# Patient Record
Sex: FEMALE | Race: WHITE | NOT HISPANIC OR LATINO | Employment: OTHER | URBAN - METROPOLITAN AREA
[De-identification: names, ages, dates, MRNs, and addresses within clinical notes are randomized per-mention and may not be internally consistent; named-entity substitution may affect disease eponyms.]

---

## 2017-02-03 ENCOUNTER — GENERIC CONVERSION - ENCOUNTER (OUTPATIENT)
Dept: OTHER | Facility: OTHER | Age: 82
End: 2017-02-03

## 2017-02-23 ENCOUNTER — ALLSCRIPTS OFFICE VISIT (OUTPATIENT)
Dept: OTHER | Facility: OTHER | Age: 82
End: 2017-02-23

## 2017-05-22 ENCOUNTER — ALLSCRIPTS OFFICE VISIT (OUTPATIENT)
Dept: OTHER | Facility: OTHER | Age: 82
End: 2017-05-22

## 2017-05-22 DIAGNOSIS — N39.0 URINARY TRACT INFECTION: ICD-10-CM

## 2017-05-22 DIAGNOSIS — R30.0 DYSURIA: ICD-10-CM

## 2017-05-22 DIAGNOSIS — R60.0 LOCALIZED EDEMA: ICD-10-CM

## 2017-05-26 ENCOUNTER — HOSPITAL ENCOUNTER (OUTPATIENT)
Dept: NON INVASIVE DIAGNOSTICS | Facility: CLINIC | Age: 82
Discharge: HOME | End: 2017-05-26
Payer: MEDICARE

## 2017-05-26 ENCOUNTER — GENERIC CONVERSION - ENCOUNTER (OUTPATIENT)
Dept: OTHER | Facility: OTHER | Age: 82
End: 2017-05-26

## 2017-05-26 DIAGNOSIS — R60.0 LOCALIZED EDEMA: ICD-10-CM

## 2017-05-26 PROCEDURE — 93971 EXTREMITY STUDY: CPT

## 2017-05-30 ENCOUNTER — ALLSCRIPTS OFFICE VISIT (OUTPATIENT)
Dept: OTHER | Facility: OTHER | Age: 82
End: 2017-05-30

## 2017-05-30 LAB
BILIRUB UR QL STRIP: NORMAL
CLARITY UR: NORMAL
COLOR UR: YELLOW
GLUCOSE (HISTORICAL): NORMAL
HGB UR QL STRIP.AUTO: 50
KETONES UR STRIP-MCNC: NORMAL MG/DL
LEUKOCYTE ESTERASE UR QL STRIP: 25
NITRITE UR QL STRIP: NORMAL
PH UR STRIP.AUTO: 5 [PH]
PROT UR STRIP-MCNC: NORMAL MG/DL
SP GR UR STRIP.AUTO: 1.01
UROBILINOGEN UR QL STRIP.AUTO: NORMAL

## 2017-06-03 ENCOUNTER — LAB CONVERSION - ENCOUNTER (OUTPATIENT)
Dept: OTHER | Facility: OTHER | Age: 82
End: 2017-06-03

## 2017-06-03 LAB
BACTERIA UR QL AUTO: ABNORMAL /HPF
BILIRUB UR QL STRIP: NEGATIVE
COLOR UR: YELLOW
COMMENT (HISTORICAL): CLEAR
CULTURE RESULT (HISTORICAL): ABNORMAL
CULTURE RESULT (HISTORICAL): NORMAL
FECAL OCCULT BLOOD DIAGNOSTIC (HISTORICAL): ABNORMAL
GLUCOSE (HISTORICAL): NEGATIVE
HYALINE CASTS #/AREA URNS LPF: ABNORMAL /LPF
KETONES UR STRIP-MCNC: NEGATIVE MG/DL
LEUKOCYTE ESTERASE UR QL STRIP: ABNORMAL
NITRITE UR QL STRIP: POSITIVE
PH UR STRIP.AUTO: 5.5 [PH] (ref 5–8)
PROT UR STRIP-MCNC: NEGATIVE MG/DL
RBC (HISTORICAL): ABNORMAL /HPF
SP GR UR STRIP.AUTO: 1.01 (ref 1–1.03)
SQUAMOUS EPITHELIAL CELLS (HISTORICAL): ABNORMAL /HPF
WBC # BLD AUTO: ABNORMAL /HPF

## 2017-06-05 ENCOUNTER — GENERIC CONVERSION - ENCOUNTER (OUTPATIENT)
Dept: OTHER | Facility: OTHER | Age: 82
End: 2017-06-05

## 2017-08-23 ENCOUNTER — GENERIC CONVERSION - ENCOUNTER (OUTPATIENT)
Dept: OTHER | Facility: OTHER | Age: 82
End: 2017-08-23

## 2017-10-19 ENCOUNTER — ALLSCRIPTS OFFICE VISIT (OUTPATIENT)
Dept: OTHER | Facility: OTHER | Age: 82
End: 2017-10-19

## 2017-10-19 DIAGNOSIS — I48.91 ATRIAL FIBRILLATION (HCC): ICD-10-CM

## 2017-10-19 DIAGNOSIS — E78.5 HYPERLIPIDEMIA: ICD-10-CM

## 2017-10-19 DIAGNOSIS — E87.1 HYPO-OSMOLALITY AND HYPONATREMIA (CODE): ICD-10-CM

## 2017-10-19 DIAGNOSIS — M54.32 SCIATICA OF LEFT SIDE: ICD-10-CM

## 2017-10-19 DIAGNOSIS — M54.31 SCIATICA OF RIGHT SIDE: ICD-10-CM

## 2017-10-19 DIAGNOSIS — R30.0 DYSURIA: ICD-10-CM

## 2017-10-19 DIAGNOSIS — I10 ESSENTIAL (PRIMARY) HYPERTENSION: ICD-10-CM

## 2017-10-19 DIAGNOSIS — G62.9 POLYNEUROPATHY: ICD-10-CM

## 2017-10-19 DIAGNOSIS — M54.50 LOW BACK PAIN: ICD-10-CM

## 2017-10-19 LAB
BILIRUB UR QL STRIP: NORMAL
CLARITY UR: NORMAL
COLOR UR: YELLOW
GLUCOSE (HISTORICAL): NORMAL
HGB UR QL STRIP.AUTO: NORMAL
KETONES UR STRIP-MCNC: NORMAL MG/DL
LEUKOCYTE ESTERASE UR QL STRIP: NORMAL
NITRITE UR QL STRIP: NORMAL
PH UR STRIP.AUTO: 6 [PH]
PROT UR STRIP-MCNC: NORMAL MG/DL
SP GR UR STRIP.AUTO: 1.01
UROBILINOGEN UR QL STRIP.AUTO: NORMAL

## 2017-10-21 NOTE — PROGRESS NOTES
Assessment  1  Atrial fibrillation (427 31) (I48 91)   2  Benign essential hypertension (401 1) (I10)   3  Bilateral sciatica (724 3) (M54 31,M54 32)   4  Dysuria (788 1) (R30 0)   5  Hyponatremia (276 1) (E87 1)   6  Lumbago (724 2) (M54 5)   7  Peripheral neuropathy (356 9) (G62 9)   8  Mixed stress and urge urinary incontinence (788 33) (N39 46)   9  Hyperlipidemia (272 4) (E78 5)    Plan  Atrial fibrillation, Benign essential hypertension, Compression of lumbar vertebra,  Hyperlipidemia, Hyponatremia    · Hydrocodone-Acetaminophen 5-325 MG Oral Tablet; TAKE 1 TABLET Every 6  hours  Atrial fibrillation, Benign essential hypertension, Dysuria, Hyperlipidemia, Hyponatremia,  Peripheral neuropathy    · (1) AMIODARONE; Status:Active; Requested ADV:[id number];    · (1) CBC/PLT/DIFF; Status:Active; Requested TDP:[id number];    · (1) COMPREHENSIVE METABOLIC PANEL; Status:Active; Requested LOD:[id number];    · (1) LIPID PANEL, FASTING; Status:Active; Requested for:19Oct2017;    · (1) TSH WITH FT4 REFLEX; Status:Active; Requested for:19Oct2017;   Bilateral sciatica, Lumbago    · * XR SPINE LUMBAR MINIMUM 4 VIEWS NON INJURY; Status:Active; Requested  PYJ:[id number];   Dysuria    · (1) URINALYSIS (will reflex a microscopy if leukocytes, occult blood, protein or nitrites are  not within normal limits); Status:Active; Requested for:19Oct2017;    · Urine Dip Automated- POC; Status:Complete;   Done: 96KFQ0270 04:07PM  Health Maintenance    · *VB - Urinary Incontinence Screen (Dx Z13 89 Screen for UI); Status:Complete;   Done:  78FAB8264 02:59PM  Insomnia    · ALPRAZolam 0 25 MG Oral Tablet; TAKE 1 TABLET EVERY 6 TO 8 HOURS AS  NEEDED   · TraZODone HCl - 50 MG Oral Tablet; Take 1 tablet by mouth at bedtime    Discussion/Summary    - I did provide orders for repeat labs as the patient is on amiodarone for history of atrial fibrillation  She is not presently on anticoagulation  does have history of urinary tract infection and urinary incontinence  Urinalysis done in the office was completely normal  I suspect that her lower back pain is related to her spinal stenosis  Order provided for x-ray of the lumbar spine  Patient does have history of compression fracture in the pastremains on amiodarone and metoprolol for history of atrial fibrillation  Presently she is in a normal sinus rhythm and osteoporosis  provided for when necessary Vicodin for lower back painremains on when necessary alprazolam for anxiety symptomstrazodone for depression with insomniagabapentin for peripheral neuropathywell controlled on current dose of lisinopril and metoprololcall daughter with results of labs  continues to ambulate with walker  We'll likely need to follow-up in 6 months  The patient, patient's family was counseled regarding instructions for management,-- prognosis,-- impressions,-- risks and benefits of treatment options  total time of encounter was 43 minutes-- and-- 25 minutes was spent counseling  Chief Complaint  Six month follow-up and med refill   Patient is here today for follow up of chronic conditions described in HPI  History of Present Illness  51-year-old female presents with her daughter he does perform translation for her  She was recently seen for urinary tract infection which was adequately treated  No dysuria however she does have right lower back pain  She does continue to have chronic constipation  Linzess is effective however she must take it every other day otherwise it is too painful for her to take  Patient continues on amiodarone  She does have history of paroxysmal atrial fibrillation  Patient also has history of spinal stenosis, bilateral sciatica  She does ambulate with a walker  Vicodin does provide relief of her lower back pain however it is quite constipating  She does need refills of all      Review of Systems    Constitutional: No fever, no chills, feels well, no tiredness, no recent weight gain or weight loss     Eyes: No complaints of eye pain, no red eyes, no eyesight problems, no discharge, no dry eyes, no itching of eyes  ENT: no complaints of earache, no loss of hearing, no nose bleeds, no nasal discharge, no sore throat, no hoarseness  Cardiovascular: lower extremity edema, but-- the heart rate was not slow,-- no chest pain,-- no intermittent leg claudication,-- the heart rate was not fast-- and-- no palpitations  Respiratory: No complaints of shortness of breath, no wheezing, no cough, no SOB on exertion, no orthopnea, no PND  Gastrointestinal: constipation, but-- as noted in HPI,-- no abdominal pain,-- no nausea,-- no vomiting,-- no diarrhea-- and-- no blood in stools  Genitourinary: No complaints of dysuria, no incontinence, no pelvic pain, no dysmenorrhea, no vaginal discharge or bleeding  Musculoskeletal: arthralgias,-- joint stiffness-- and-- And relates that the assistance of a walker, but-- as noted in HPI  Integumentary: No complaints of skin rash or lesions, no itching, no skin wounds, no breast pain or lump  Neurological: numbness-- and-- tingling, but-- no headache,-- no confusion,-- no dizziness,-- no convulsions-- and-- no fainting  Psychiatric: sleep disturbances-- and-- depression, but-- as noted in HPI,-- not suicidal,-- no anxiety,-- no personality change-- and-- no emotional problems  Endocrine: No complaints of proptosis, no hot flashes, no muscle weakness, no deepening of the voice, no feelings of weakness  Hematologic/Lymphatic: No complaints of swollen glands, no swollen glands in the neck, does not bleed easily, does not bruise easily  Active Problems  1  Atrial fibrillation (427 31) (I48 91)   2  Benign essential hypertension (401 1) (I10)   3  Bilateral sciatica (724 3) (M54 31,M54 32)   4  Cataract (366 9) (H26 9)   5  Change in stool habits (787 99) (R19 4)   6  Compression of lumbar vertebra (805 4) (S32 000A)   7  Depression (311) (F32 9)   8   Dizziness and giddiness (780  4) (R42)   9  Dysfunction of Eustachian tube, unspecified laterality (381 81) (H69 80)   10  Dysuria (788 1) (R30 0)   11  Edema of right lower extremity (782 3) (R60 0)   12  Gastroparesis (536 3) (K31 84)   13  Hyperlipidemia (272 4) (E78 5)   14  Hyponatremia (276 1) (E87 1)   15  Insomnia (780 52) (G47 00)   16  Lumbago (724 2) (M54 5)   17  Mixed stress and urge urinary incontinence (788 33) (N39 46)   18  Peripheral neuropathy (356 9) (G62 9)   19  Restless legs syndrome (333 94) (G25 81)   20  Right shoulder pain (719 41) (M25 511)   21  Rosacea, acne (695 3) (L71 9)   22  Senile dementia without behavioral disturbance (290 0) (F03 90)   23  Stasis edema of right lower extremity (459 30) (I87 301)   24  Tremor (781 0) (R25 1)    Past Medical History  1  History of Chest pain (786 50) (R07 9)   2  History of Gait disturbance (781 2) (R26 9)   3  History of abdominal pain (V13 89) (Z87 898)   4  History of acute bronchitis (V12 69) (Z87 09)   5  History of melena (V12 79) (Z87 19)   6  History of urinary tract infection (V13 02) (Z87 440)   7  History of wheezing (V12 69) (Z87 898)   8  History of Hypoxemia (799 02) (R09 02)   9  History of Tension type headache (339 10) (G44 209)    The active problems and past medical history were reviewed and updated today  Surgical History  1  History of Back Surgery    Family History  Mother    1  No pertinent family history    The family history was reviewed and updated today  Social History   · Lives with family   · Never A Smoker  The social history was reviewed and updated today  The social history was reviewed and is unchanged  Current Meds   1  ALPRAZolam 0 25 MG Oral Tablet; TAKE 1 TABLET EVERY 6 TO 8 HOURS AS NEEDED; Therapy: 77EAZ2382 to (Evaluate:51Spx4913); Last Rx:21Def7415 Ordered   2  Amiodarone HCl - 200 MG Oral Tablet; TAKE 1 TABLET DAILY;    Therapy: 21Tpb9187 to (Evaluate:17May2018)  Requested for: 07ORQ3532; Last   Rx:22May2017 Ordered   3  Aspirin 81 MG TABS; take 2 tablet daily; Therapy: 77RRX6335 to (Evaluate:42Rkk3027) Recorded   4  Gabapentin 100 MG Oral Capsule; TAKE 1 CAPSULE TWICE DAILY; Therapy: 73DWK0042 to (Evaluate:17May2018)  Requested for: 59GSI5895; Last   Rx:22May2017 Ordered   5  Hydrocodone-Acetaminophen 5-325 MG Oral Tablet; TAKE 1 TABLET Every 6 hours; Therapy: 32STV2068 to (Evaluate:21Jun2017); Last Rx:22May2017 Ordered   6  Linzess 145 MCG Oral Capsule; take 1 capsule daily; Therapy: 54JIM5918 to (Last Rx:22May2017)  Requested for: 96GPM8082 Ordered   7  Lisinopril 10 MG Oral Tablet; take 1 tablet by mouth every day; Therapy: 56HCW9051 to (Evaluate:17May2018)  Requested for: 42QQY6714; Last   Rx:22May2017 Ordered   8  Magnesium 400 MG CAPS; take 1 capsule daily; Therapy: 31Wik9433 to Recorded   9  Metoprolol Succinate  MG Oral Tablet Extended Release 24 Hour; take 1 tablet by   mouth daily; Therapy: 95MIJ3715 to (Evaluate:17May2018)  Requested for: 52MQP4905; Last   Rx:22May2017 Ordered   10  MetroNIDAZOLE 0 75 % External Cream; APPLY AND GENTLY MASSAGE INTO    AFFECTED AREA(S) TWICE DAILY; Therapy: 65OAH9081 to (Last Rx:27Apr2016)  Requested for: 69Jdv2410 Ordered   11  ROPINIRole HCl - 2 MG Oral Tablet; TAKE 1 TABLET AT BEDTIME; Therapy: 04PWK1219 to (Chito Lawrence)  Requested for: 36NET0108; Last    Rx:22May2017 Ordered   12  Tolterodine Tartrate ER 2 MG Oral Capsule Extended Release 24 Hour; take 1 capsule    daily; Therapy: 46IWI4747 to (Evaluate:08Ufn0811)  Requested for: 98SUY5726; Last    Rx:19Jun2017 Ordered   13  TraZODone HCl - 50 MG Oral Tablet; Take 1 tablet by mouth at bedtime; Therapy: 89CLF0992 to (Evaluate:16Nov2017)  Requested for: 14BMH7578; Last    Rx:20May2017 Ordered    The medication list was reviewed and updated today        Allergies  1  morphine    Vitals  Vital Signs    Recorded: 47NNW2542 03:06PM   Heart Rate 68   Respiration 16   Systolic 738   Diastolic 78   Height 4 ft 10 in   Weight 143 lb    BMI Calculated 29 89   BSA Calculated 1 58     Physical Exam    Constitutional   General appearance: Abnormal   well developed,-- acutely ill,-- comfortable,-- well nourished-- and-- well hydrated  -- No acute distress, elderly and frail  Ears, Nose, Mouth, and Throat   Oropharynx: Normal with no erythema, edema, exudate or lesions  Pulmonary   Respiratory effort: No increased work of breathing or signs of respiratory distress  Auscultation of lungs: Clear to auscultation  Cardiovascular   Palpation of heart: Normal PMI, no thrills  Auscultation of heart: Normal rate and rhythm, normal S1 and S2, without murmurs  Examination of extremities for edema and/or varicosities: Abnormal   right ankle 2+ pitting edema-- and-- right pretibial 2+ pitting edema, but-- no left ankle edema-- and-- no left pretibial edema  Carotid pulses: Normal     Abdomen   Abdomen: Non-tender, no masses  Bowel sounds were normal  The abdomen was soft and nontender  no masses palpated  Liver and spleen: No hepatomegaly or splenomegaly  Musculoskeletal   Gait and station: Abnormal   Gait evaluation demonstrated stooping-- and-- antalgia bilaterally  -- Ambulates with the assistance of a walker  Inspection/palpation of joints, bones, and muscles: Abnormal  -- Diffuse osteoarthritic changes in both hands and shoulders  Skin   Skin and subcutaneous tissue: Normal without rashes or lesions  Neurologic   Cranial nerves: Cranial nerves 2-12 intact  Reflexes: 2+ and symmetric           Results/Data  Urine Dip Automated- POC 26COB6763 04:07PM Deanna Lopez     Test Name Result Flag Reference   Color Yellow     Clarity Cloudy     Leukocytes neg     Nitrite neg     Blood neg     Bilirubin neg     Urobilinogen norm     Protein neg     Ph 6     Specific Gravity 1 015     Ketone neg     Glucose norm       Falls Risk Assessment (Dx Z13 89 Screen for Neurologic Disorder) 96QYA9783 03:01PM User, [facility]     Test Name Result Flag Reference   Falls Risk      2 or more falls past year     *VB - Urinary Incontinence Screen (Dx Z13 89 Screen for UI) 36RKQ8439 02:59PM Kristopher Mccall     Test Name Result Flag Reference   Urinary Incontinence Assessment 19Oct2017         Health Management  Health Maintenance   Medicare Annual Wellness Visit; every 1 year; Last 00PMW1023; Next Due: 40Upq9768;  Overdue    Signatures   Electronically signed by : Lois Rios DO; Oct 20 2017  3:38PM EST                       (Author)

## 2017-10-23 ENCOUNTER — HOSPITAL ENCOUNTER (OUTPATIENT)
Dept: RADIOLOGY | Facility: HOSPITAL | Age: 82
Discharge: HOME | End: 2017-10-23
Payer: MEDICARE

## 2017-10-23 ENCOUNTER — APPOINTMENT (OUTPATIENT)
Dept: LAB | Facility: CLINIC | Age: 82
End: 2017-10-23
Payer: MEDICARE

## 2017-10-23 DIAGNOSIS — R30.0 DYSURIA: ICD-10-CM

## 2017-10-23 DIAGNOSIS — E87.1 HYPO-OSMOLALITY AND HYPONATREMIA (CODE): ICD-10-CM

## 2017-10-23 DIAGNOSIS — M54.31 SCIATICA OF RIGHT SIDE: ICD-10-CM

## 2017-10-23 DIAGNOSIS — G62.9 POLYNEUROPATHY: ICD-10-CM

## 2017-10-23 DIAGNOSIS — I48.91 ATRIAL FIBRILLATION (HCC): ICD-10-CM

## 2017-10-23 DIAGNOSIS — M54.32 SCIATICA OF LEFT SIDE: ICD-10-CM

## 2017-10-23 DIAGNOSIS — E78.5 HYPERLIPIDEMIA: ICD-10-CM

## 2017-10-23 DIAGNOSIS — I10 ESSENTIAL (PRIMARY) HYPERTENSION: ICD-10-CM

## 2017-10-23 DIAGNOSIS — M54.50 LOW BACK PAIN: ICD-10-CM

## 2017-10-23 LAB
ALBUMIN SERPL BCP-MCNC: 3.4 G/DL (ref 3.5–5)
ALP SERPL-CCNC: 81 U/L (ref 46–116)
ALT SERPL W P-5'-P-CCNC: 24 U/L (ref 12–78)
ANION GAP SERPL CALCULATED.3IONS-SCNC: 8 MMOL/L (ref 4–13)
AST SERPL W P-5'-P-CCNC: 26 U/L (ref 5–45)
BASOPHILS # BLD AUTO: 0.04 THOUSANDS/ΜL (ref 0–0.1)
BASOPHILS NFR BLD AUTO: 1 % (ref 0–1)
BILIRUB SERPL-MCNC: 0.3 MG/DL (ref 0.2–1)
BUN SERPL-MCNC: 21 MG/DL (ref 5–25)
CALCIUM SERPL-MCNC: 8.9 MG/DL (ref 8.3–10.1)
CHLORIDE SERPL-SCNC: 100 MMOL/L (ref 100–108)
CHOLEST SERPL-MCNC: 231 MG/DL (ref 50–200)
CO2 SERPL-SCNC: 29 MMOL/L (ref 21–32)
CREAT SERPL-MCNC: 1.21 MG/DL (ref 0.6–1.3)
EOSINOPHIL # BLD AUTO: 0.03 THOUSAND/ΜL (ref 0–0.61)
EOSINOPHIL NFR BLD AUTO: 1 % (ref 0–6)
ERYTHROCYTE [DISTWIDTH] IN BLOOD BY AUTOMATED COUNT: 14.5 % (ref 11.6–15.1)
GFR SERPL CREATININE-BSD FRML MDRD: 39 ML/MIN/1.73SQ M
GLUCOSE P FAST SERPL-MCNC: 97 MG/DL (ref 65–99)
HCT VFR BLD AUTO: 39.9 % (ref 34.8–46.1)
HDLC SERPL-MCNC: 58 MG/DL (ref 40–60)
HGB BLD-MCNC: 13.5 G/DL (ref 11.5–15.4)
LDLC SERPL CALC-MCNC: 142 MG/DL (ref 0–100)
LYMPHOCYTES # BLD AUTO: 2.4 THOUSANDS/ΜL (ref 0.6–4.47)
LYMPHOCYTES NFR BLD AUTO: 42 % (ref 14–44)
MCH RBC QN AUTO: 31.9 PG (ref 26.8–34.3)
MCHC RBC AUTO-ENTMCNC: 33.8 G/DL (ref 31.4–37.4)
MCV RBC AUTO: 94 FL (ref 82–98)
MONOCYTES # BLD AUTO: 0.47 THOUSAND/ΜL (ref 0.17–1.22)
MONOCYTES NFR BLD AUTO: 8 % (ref 4–12)
NEUTROPHILS # BLD AUTO: 2.81 THOUSANDS/ΜL (ref 1.85–7.62)
NEUTS SEG NFR BLD AUTO: 48 % (ref 43–75)
PLATELET # BLD AUTO: 239 THOUSANDS/UL (ref 149–390)
PMV BLD AUTO: 9.3 FL (ref 8.9–12.7)
POTASSIUM SERPL-SCNC: 4.5 MMOL/L (ref 3.5–5.3)
PROT SERPL-MCNC: 7.3 G/DL (ref 6.4–8.2)
RBC # BLD AUTO: 4.23 MILLION/UL (ref 3.81–5.12)
SODIUM SERPL-SCNC: 137 MMOL/L (ref 136–145)
TRIGL SERPL-MCNC: 154 MG/DL
TSH SERPL DL<=0.05 MIU/L-ACNC: 1.92 UIU/ML (ref 0.36–3.74)
WBC # BLD AUTO: 5.75 THOUSAND/UL (ref 4.31–10.16)

## 2017-10-23 PROCEDURE — 80053 COMPREHEN METABOLIC PANEL: CPT

## 2017-10-23 PROCEDURE — 36415 COLL VENOUS BLD VENIPUNCTURE: CPT

## 2017-10-23 PROCEDURE — 80061 LIPID PANEL: CPT

## 2017-10-23 PROCEDURE — 84443 ASSAY THYROID STIM HORMONE: CPT

## 2017-10-23 PROCEDURE — 72110 X-RAY EXAM L-2 SPINE 4/>VWS: CPT

## 2017-10-23 PROCEDURE — 85025 COMPLETE CBC W/AUTO DIFF WBC: CPT

## 2017-10-23 PROCEDURE — 80299 QUANTITATIVE ASSAY DRUG: CPT

## 2017-10-25 ENCOUNTER — GENERIC CONVERSION - ENCOUNTER (OUTPATIENT)
Dept: OTHER | Facility: OTHER | Age: 82
End: 2017-10-25

## 2017-10-25 LAB
AMIODARONE SERPL-MCNC: 1.5 UG/ML (ref 1–2.5)
DESETHYLAMIODARONE SERPL-MCNC: 1.4 UG/ML (ref 1–2.5)

## 2017-10-26 ENCOUNTER — GENERIC CONVERSION - ENCOUNTER (OUTPATIENT)
Dept: OTHER | Facility: OTHER | Age: 82
End: 2017-10-26

## 2017-10-30 ENCOUNTER — ALLSCRIPTS OFFICE VISIT (OUTPATIENT)
Dept: OTHER | Facility: OTHER | Age: 82
End: 2017-10-30

## 2017-10-31 NOTE — CONSULTS
Assessment  1  Compression of lumbar vertebra (805 4) (S32 000A)   2  Lumbago (724 2) (M54 5)    Plan  Lumbago    · *1 - [facility]  home physical therapy for lumbar ROM, core  strengthening, hip girdle/LE strengthening  Status: Active  Requested for: 91TQB7462   Ordered; For: Lumbago; Ordered By: Nadja Reining Performed:  Due: 38WTI9240; Last Updated By: Vince Trotter; 10/30/2017 2:03:04 PM  Care Summary provided  : Yes   · 1 - Shanda Rivera MD, [facility] (Pain Management) Co-Management  please assess for back  pain  Status: Active  Requested for: 63RHE8029   Ordered; For: Lumbago; Ordered By: Nadja Reining Performed:  Due: 91FDG0339  Care Summary provided  : Yes   · Follow-up PRN Evaluation and Treatment  Follow-up  Status: Complete  Done:  83SBI0954   Ordered; For: Lumbago; Ordered By: Nadja Reining Performed:  Due: 27DXO9949    Discussion/Summary    Referred by Dr Radha Osuna  Mrs Danica Marcano has chronic low back pain in the setting of lumbar degeneration/osteoperosis/chronic compression fracture  we reviewed the conservation medical optionsPT, MIKAYLA and medications  I have provided her with a script for PT, a referral to Dr Shanda Rivera and will see her on a PRN basis  The patient has the current Goals: Improve function  The patent has the current Barriers: None  Patient is able to Self-Care  Patient agrees and allows to involve family/caregiver in development of care plan: Radha Escobar  Self Referrals: No Dr Jeremy Walsh      Chief Complaint  1  Back Pain  Patient presents for initial consultation  History of Present Illness  Chronic LBP x 20 yr with previous 2 level interspinous distraction devices  Fall 2 yrs ago with T12 compression fracture  No recent PT, MIKAYLA or meds  Norrine Angelucci presents with complaints of gradual onset of constant episodes of moderate bilateral lower back pain, radiating to the right buttock, right thigh and right lower leg   On a scale of 1 to 10, the patient rates the pain as 8  Episodes started about 20 years ago  Symptoms are worsening  Associated symptoms include stiffness, but-- no spasm,-- no fever,-- no night sweats,-- no abdominal pain,-- no general malaise,-- no weight loss,-- no arm numbness,-- no arm weakness,-- no urinary incontinence,-- no fecal incontinence,-- no urinary retention-- and-- no rash localized to the area of pain    leg numbness (right leg and bilateral foot numbness)  leg weakness (bilateral LE weakness)  Review of Systems    Constitutional: no fever,-- not feeling poorly-- and-- no chills  Eyes: eyesight problems, but-- no eye pain  ENT: hearing loss  Cardiovascular: no chest pain-- and-- no palpitations  Respiratory: no shortness of breath,-- no cough-- and-- no wheezing  Gastrointestinal: no abdominal pain-- and-- no nausea  Genitourinary: no incontinence  Musculoskeletal: limb pain-- (right LE pain)-- and-- joint stiffness-- (lower back stiffness), but-- as noted in HPI,-- no joint swelling,-- no myalgias-- and-- no limb swelling--    The patient presents with complaints of gradual onset of moderate lower back and right hip arthralgias  Neurological: numbness-- (right LE numbness),-- tingling-- (right LE tingling),-- limb weakness-- (bilateral LE weakness)-- and-- difficulty walking-- (uses a roller walker for support), but-- as noted in HPI,-- no headache,-- no confusion,-- no dizziness,-- no convulsions-- and-- no fainting  Psychiatric: no anxiety-- and-- no depression  Endocrine: no muscle weakness  Hematologic/Lymphatic: a tendency for easy bleeding-- and-- a tendency for easy bruising  ROS reviewed  Active Problems  1  Atrial fibrillation (427 31) (I48 91)   2  Benign essential hypertension (401 1) (I10)   3  Bilateral sciatica (724 3) (M54 31,M54 32)   4  Cataract (366 9) (H26 9)   5  Change in stool habits (787 99) (R19 4)   6  Compression of lumbar vertebra (805 4) (S32 000A)   7  Depression (311) (F32 9)   8  Dizziness and giddiness (780 4) (R42)   9  Dysfunction of Eustachian tube, unspecified laterality (381 81) (H69 80)   10  Dysuria (788 1) (R30 0)   11  Edema of right lower extremity (782 3) (R60 0)   12  Gastroparesis (536 3) (K31 84)   13  Hyperlipidemia (272 4) (E78 5)   14  Hyponatremia (276 1) (E87 1)   15  Insomnia (780 52) (G47 00)   16  Lumbago (724 2) (M54 5)   17  Mixed stress and urge urinary incontinence (788 33) (N39 46)   18  Peripheral neuropathy (356 9) (G62 9)   19  Restless legs syndrome (333 94) (G25 81)   20  Right shoulder pain (719 41) (M25 511)   21  Rosacea, acne (695 3) (L71 9)   22  Senile dementia without behavioral disturbance (290 0) (F03 90)   23  Stasis edema of right lower extremity (459 30) (I87 301)   24  Tremor (781 0) (R25 1)    Past Medical History  1  History of Chest pain (786 50) (R07 9)   2  History of Gait disturbance (781 2) (R26 9)   3  History of abdominal pain (V13 89) (Z87 898)   4  History of acute bronchitis (V12 69) (Z87 09)   5  History of melena (V12 79) (Z87 19)   6  History of urinary tract infection (V13 02) (Z87 440)   7  History of wheezing (V12 69) (Z87 898)   8  History of Hypoxemia (799 02) (R09 02)   9  History of Tension type headache (339 10) (G44 209)    Surgical History  1  History of Back Surgery    Family History  Mother    1  No pertinent family history    Social History   · Lives with family   · Never A Smoker    Current Meds   1  ALPRAZolam 0 25 MG Oral Tablet; TAKE 1 TABLET EVERY 6 TO 8 HOURS AS NEEDED; Therapy: 62YGM4209 to (Evaluate:19Jan2018); Last Rx:19Oct2017 Ordered   2  Amiodarone HCl - 200 MG Oral Tablet; TAKE 1 TABLET DAILY; Therapy: 78Usy9641 to (Evaluate:17May2018)  Requested for: 29BJC7934; Last   Rx:22May2017 Ordered   3  Aspirin 81 MG TABS; take 2 tablet daily; Therapy: 81BVG5560 to (Evaluate:53Uvd7812) Recorded   4  Gabapentin 100 MG Oral Capsule; TAKE 1 CAPSULE TWICE DAILY;    Therapy: 80OVM9992 to (Evaluate:17May2018) Requested for: 95JNW0962; Last   Rx:22May2017 Ordered   5  Hydrocodone-Acetaminophen 5-325 MG Oral Tablet; TAKE 1 TABLET Every 6 hours; Therapy: 17Zys9289 to (Evaluate:18Nov2017); Last Rx:19Oct2017 Ordered   6  Linzess 145 MCG Oral Capsule; take 1 capsule daily; Therapy: 58DBJ2228 to (Last Rx:22May2017)  Requested for: 98AFG5422 Ordered   7  Lisinopril 10 MG Oral Tablet; take 1 tablet by mouth every day; Therapy: 69HVL4728 to (Evaluate:17May2018)  Requested for: 71WQX6249; Last   Rx:22May2017 Ordered   8  Magnesium 400 MG CAPS; take 1 capsule daily; Therapy: 77Liu2618 to Recorded   9  Metoprolol Succinate  MG Oral Tablet Extended Release 24 Hour; take 1 tablet by   mouth daily; Therapy: 39YYP0015 to (Evaluate:17May2018)  Requested for: 06DCW1175; Last   Rx:22May2017 Ordered   10  MetroNIDAZOLE 0 75 % External Cream; APPLY AND GENTLY MASSAGE INTO    AFFECTED AREA(S) TWICE DAILY; Therapy: 45WME0543 to (Last Rx:27Apr2016)  Requested for: 97Aaz4131 Ordered   11  ROPINIRole HCl - 2 MG Oral Tablet; TAKE 1 TABLET AT BEDTIME; Therapy: 02GGD4238 to ([place] Dian)  Requested for: 88KWP1680; Last    Rx:22May2017 Ordered   12  Tolterodine Tartrate ER 2 MG Oral Capsule Extended Release 24 Hour; take 1 capsule    daily; Therapy: 23YOA4519 to (Evaluate:12Vgj2369)  Requested for: 19GED5142; Last    Rx:19Jun2017 Ordered   13  TraZODone HCl - 50 MG Oral Tablet; Take 1 tablet by mouth at bedtime;     Therapy: 80HXT5831 to (Evaluate:14Oct2018)  Requested for: 19Oct2017; Last    Rx:19Oct2017 Ordered    Allergies  1  morphine    Vitals  Vital Signs    Recorded: 30Oct2017 01:35PM   Temperature 97 7 F   Heart Rate 67   Respiration 16   Systolic 202   Diastolic 73   Height 4 ft 10 in   Weight 143 lb    BMI Calculated 29 89   BSA Calculated 1 58   Pain Scale 8     Physical Exam   (grade 4+/5 bilateral LE, grossly intact sensation bilaterally, negative SLR, walks with walker, stooped gait)   Constitutional Patient appears healthy and well developed  No signs of acute distress present  Eyes Vision is grossly normal  Discs flat with sharp margins  Respiratory Auscultation of lungs: Clear to auscultation bilaterally  Cardiovascular Auscultation of heart: Rate is regular  Rhythm is regular  No heart murmur appreciated  Carotid pulses: 2+ and equal bilaterally, without bruits  -- Peripheral vascular exam: Pedal Pulses: 2+ and equal bilaterally  Radial pulses: 2+ and equal bilaterally  -- Extremities: No edema of the lower limbs bilaterally  Abdomen The abdomen is flat  No abdominal scars  Abdomen is soft, nontender, and nondistended  -- Anus, perineum, and rectum: Exam deferred  Neurologic - Mental Status: Alert and Oriented x3  -- Mood and affect: Affect is normal  -- Memory is grossly intact  -- Attention is WNL  -- Speech is articulated and fluent  -- Knowledge and vocabulary consistent with education  Cranial Nerve Exam:  2nd cranial nerve: Visual field was full to confrontation  -- 3rd, 4th, and 6th cranial nerves: Normal with no deficit  -- 5th cranial nerve: Sensation was intact in all three divisions to light touch and temperature  Motor function was intact  -- 7th cranial nerve: Face symmetrical at grimace and at rest -- 8th cranial nerve: Grossly intact to finger rub bilaterally  -- 9th and 10th cranial nerves: Uvula is midline  -- 11th cranial nerve: Shoulder shrug equal bilaterally  -- 12th cranial nerve: Tongue mideline, no atrophy present  Motor System General Motor Strength: No pronator drift and no parietal drift  Motor System - Upper Extremities: Normal to inspection and palpation  Strength: Deltoids 5/5 bilaterally  Biceps 5/5 bilaterally  Triceps 5/5 bilaterally  Extensor carpi radials is 5/5 bilaterally  Extensor digitorum 5/5 bilaterally  Intrinsic 5/5 bilaterally   5/5 bilaterally  -- Muscle tone: Normal bilaterally  -- Muscle Bulk: Normal bilaterally     Motor System - Lower Extremities: Normal to inspection and palpation  Strength: Iliopsoas 5/5 bilaterally  Quadriceps 5/5 bilaterally  Hamstrings 5/5 bilaterally  Gastrocnemius 5/5 bilaterally  -- Muscle tone: Normal bilaterally  Reflexes: Biceps reflexes are 2+ bilaterally  Triceps reflexes are 2+ bilaterally  Achilles reflexes are 2+ bilaterally  Babinski's reflex is 2+ down going bilaterally  Ankle clonus is absent bilaterally  Coordination: Finger to nose was normal    Sensory: Sensation grossly intact to light touch  Sensation grossly intact to light touch  Gait and Station: [place] with a normal gait  Results/Data    I personally reviewed the xray in detail with the patient  X-ray Review severe osteopenia, multilevel lumbar disc degeneration, L4-S1 spinuous process distraction devices, chronic T12 compression fracture, grossly stable lumbar alignment        Signatures   Electronically signed by : ASHKAN Boland ; Oct 30 2017  2:11PM EST                       (Author)

## 2017-11-07 ENCOUNTER — ALLSCRIPTS OFFICE VISIT (OUTPATIENT)
Dept: OTHER | Facility: OTHER | Age: 82
End: 2017-11-07

## 2017-11-08 NOTE — CONSULTS
Assessment  Assessed    1  Lumbar stenosis with neurogenic claudication (724 03) (M48 062)   2  T12 compression fracture (805 2) (S22 080A)    Discussion/Summary    The patient's symptoms, history/physical are consistent with pain that is multifactorial in origin but predominantly the result of her spinal stenosis at L4-5 which is leading to right-sided greater than left-sided radiculopathy  While she does have a T12 compression fracture, this was visible on her MRI from 3 years ago so is chronic in nature  At this time, I discussed performing a right L4-5 transforaminal epidural steroid injection to help reduce swelling and inflammation which is leading to her pain symptoms  She was apprised of the most common risks and would like to proceed  She will be scheduled for an upcoming Tuesday or Thursday under fluoroscopic guidance  The patient has the current Goals: Improved pain control  The patent has the current Barriers: None  Patient is able to Self-Care  Patient agrees and allows to involve family/caregiver in development of care plan:   Educational resources provided: Brochure on the epidural steroid injection  The treatment plan was reviewed with the patient/guardian  The patient/guardian understands and agrees with the treatment plan      Chief Complaint  Chief Complaints    1  Back Pain    History of Present Illness  The patient is a pleasant 24-year-old female who presents for consultation in regards to lower back pain primarily right-sided leg pain  Symptoms began about 2 years ago following a fall  Her pain is moderate to severe rated 8/10 on a numeric rating scale and felt constantly but worst in the morning  Symptoms are described to be sharp, shooting with numbness and electricity that runs down into her legs  Her legs feel weak  Symptoms are aggravated with standing, bending, walking, exercise and decreased with lying down, sitting and relaxation    history has included over-the-counter analgesics which have provided minimal relief  She saw Dr Joon Shanks for consultation who has referred here for further treatment  Referring physician is  Niecy Hagan presents with complaints of gradual onset of constant episodes of severe bilateral lower back pain, described as sharp, stinging and tingling, radiating to the bilateral buttock, bilateral thigh and bilateral lower leg  On a scale of 1 to 10, the patient rates the pain as 8  Review of Systems    Constitutional: no fever,-- no recent weight gain-- and-- no recent weight loss  Eyes: double vision-- and-- blurry vision  Cardiovascular: no chest pain,-- no palpitations-- and-- no lower extremity edema  Respiratory: no complaints of shortness of breath-- and-- no wheezing  Musculoskeletal: difficulty walking,-- muscle weakness-- and-- decreased range of motion, but-- no joint stiffness,-- no joint swelling,-- no limb swelling-- and-- no pain in extremity  Neurological: dizziness, but-- no difficulty swallowing,-- no memory loss,-- no loss of consciousness-- and-- no seizures  Gastrointestinal: constipation, but-- no nausea,-- no vomiting-- and-- no diarrhea  Genitourinary: frequent urination, but-- no difficulty initiating urine stream-- and-- no genital pain  Integumentary: no complaints of skin rash  Psychiatric: no depression  Endocrine: no excessive thirst,-- no adrenal disease,-- no hypothyroidism-- and-- no hyperthyroidism  Hematologic/Lymphatic: no tendency for easy bruising-- and-- no tendency for easy bleeding  ROS reviewed  Active Problems  Problems    1  Atrial fibrillation (427 31) (I48 91)   2  Benign essential hypertension (401 1) (I10)   3  Cataract (366 9) (H26 9)   4  Change in stool habits (787 99) (R19 4)   5  Depression (311) (F32 9)   6  Dizziness and giddiness (780 4) (R42)   7  Dysfunction of Eustachian tube, unspecified laterality (381 81) (H69 80)   8  Dysuria (788 1) (R30 0)   9   Edema of right lower extremity (782 3) (R60 0)   10  Gastroparesis (536 3) (K31 84)   11  Hyperlipidemia (272 4) (E78 5)   12  Hyponatremia (276 1) (E87 1)   13  Insomnia (780 52) (G47 00)   14  Lumbago (724 2) (M54 5)   15  Mixed stress and urge urinary incontinence (788 33) (N39 46)   16  Peripheral neuropathy (356 9) (G62 9)   17  Restless legs syndrome (333 94) (G25 81)   18  Right shoulder pain (719 41) (M25 511)   19  Rosacea, acne (695 3) (L71 9)   20  Senile dementia without behavioral disturbance (290 0) (F03 90)   21  Stasis edema of right lower extremity (459 30) (I87 301)   22  T12 compression fracture (805 2) (S22 080A)   23  Tremor (781 0) (R25 1)    Past Medical History  Problems    1  History of Chest pain (786 50) (R07 9)   2  History of Gait disturbance (781 2) (R26 9)   3  History of abdominal pain (V13 89) (Z87 898)   4  History of acute bronchitis (V12 69) (Z87 09)   5  History of melena (V12 79) (Z87 19)   6  History of urinary tract infection (V13 02) (Z87 440)   7  History of wheezing (V12 69) (Z87 898)   8  History of Hypoxemia (799 02) (R09 02)   9  History of Tension type headache (339 10) (G44 209)    The active problems and past medical history were reviewed and updated today  Surgical History  Problems    1  History of Back Surgery    The surgical history was reviewed and updated today  Family History  Mother    1  No pertinent family history    The family history was reviewed and updated today  Social History  Problems    · Lives with family   · Never A Smoker  The social history was reviewed and updated today  The social history was reviewed and is unchanged  Current Meds   1  ALPRAZolam 0 25 MG Oral Tablet; TAKE 1 TABLET EVERY 6 TO 8 HOURS AS NEEDED; Therapy: 48ZSF7348 to (Evaluate:19Jan2018); Last Rx:19Oct2017 Ordered   2  Amiodarone HCl - 200 MG Oral Tablet; TAKE 1 TABLET DAILY;    Therapy: 82Bqm7885 to (Evaluate:60Vsg0463)  Requested for: 55JSZ3169; Last Rx: 97EIG0440 Ordered   3  Aspirin 81 MG TABS; take 2 tablet daily; Therapy: 18FJA7587 to (Evaluate:78Pdx3688) Recorded   4  Gabapentin 100 MG Oral Capsule; TAKE 1 CAPSULE TWICE DAILY; Therapy: 30QPZ2898 to (Evaluate:17May2018)  Requested for: 68FXA7073; Last   Rx:22May2017 Ordered   5  Hydrocodone-Acetaminophen 5-325 MG Oral Tablet; TAKE 1 TABLET Every 6 hours; Therapy: 51Kmg7382 to (Evaluate:18Nov2017); Last Rx:19Oct2017 Ordered   6  Linzess 145 MCG Oral Capsule; take 1 capsule daily; Therapy: 90FTC8666 to (Last Rx:22May2017)  Requested for: 73OXD3889 Ordered   7  Lisinopril 10 MG Oral Tablet; take 1 tablet by mouth every day; Therapy: 70MFZ6019 to (Evaluate:17May2018)  Requested for: 35LEE8338; Last   Rx:22May2017 Ordered   8  Magnesium 400 MG CAPS; take 1 capsule daily; Therapy: 37Mvd8149 to Recorded   9  Metoprolol Succinate  MG Oral Tablet Extended Release 24 Hour; take 1 tablet by   mouth daily; Therapy: 97WVS2428 to (Evaluate:17May2018)  Requested for: 90RWL2886; Last   Rx:22May2017 Ordered   10  MetroNIDAZOLE 0 75 % External Cream; APPLY AND GENTLY MASSAGE INTO    AFFECTED AREA(S) TWICE DAILY; Therapy: 93BXB7063 to (Last Rx:27Apr2016)  Requested for: 57Abm7530 Ordered   11  ROPINIRole HCl - 2 MG Oral Tablet; TAKE 1 TABLET AT BEDTIME; Therapy: 82NGA9337 to (Margo Bangura)  Requested for: 49JCT2314; Last    Rx:22May2017 Ordered   12  Tolterodine Tartrate ER 2 MG Oral Capsule Extended Release 24 Hour; take 1 capsule    daily; Therapy: 55HET7560 to (Evaluate:21Jbd2817)  Requested for: 36LJE3641; Last    Rx:19Jun2017 Ordered   13  TraZODone HCl - 50 MG Oral Tablet; Take 1 tablet by mouth at bedtime; Therapy: 27NCH1498 to (Evaluate:14Oct2018)  Requested for: 19Oct2017; Last    Rx:19Oct2017 Ordered    The medication list was reviewed and updated today        Allergies  Medication    1  morphine    Vitals  Vital Signs    Recorded: 55IGG0724 01:08PM   Temperature 98 4 F   Heart Rate 70   Respiration 16   Systolic 655   Diastolic 70   Height 4 ft 10 in   Weight 144 lb    BMI Calculated 30 1   BSA Calculated 1 58   Pain Scale 8     Physical Exam    Constitutional   General appearance: Well developed, well nourished, alert, in no distress, non-toxic and no overt pain behavior  Eyes   Sclera: anicteric   HEENT   Hearing grossly intact  Pulmonary   Respiratory effort: Even and unlabored  Cardiovascular   Examination of extremities: No edema or pitting edema present  Skin   Skin and subcutaneous tissue: Normal without rashes or lesions, well hydrated  Psychiatric   Mood and affect: Mood and affect appropriate  Musculoskeletal   Gait and station: Abnormal  -- antalgic; pt ambulates with walker  Lumbar/Sacral Spine examination demonstrates Lumbosacral Spine:   Appearance: Normal    Tenderness: at lumbar spine,-- right paraspinal-- and-- right sciatic notch  Lumbosacral Spine Sensory: intact to light touch and pinprick in the lower extremities  Flexion was not restricted-- and-- was painless  Extension was restricted-- and-- was painful  Left lateral flexion was restricted-- and-- was painful  Right lateral flexion was restricted-- and-- was painful  Rotation to the left was restricted-- and-- was painful  Rotation to the right was restricted-- and-- was painful  Foot and ankle strength was normal bilaterally  Knee strength was normal bilaterally  Hip strength was normal bilaterally  Evaluation of Muscle Stretch Reflexes on the right side demonstrates 2/4 Knee Jerk Reflex-- and-- 2/4 Ankle Jerk Reflex  Evaluation of Muscle Stretch Reflexes on the left side demonstrates 2/4 Knee Jerk Reflex-- and-- 2/4 Ankle Jerk Reflex  Results/Data    Results     Radiology:   LUMBAR SPINE dated 10/23/17  Study performed October 23, 2017 at 8:40 AM  Study submitted for interpretation on October 25, 2017 at 11:30 AM     INDICATION: Chronic low back pain radiating down both legs  No history of trauma  COMPARISON: July 26, 2014    VIEWS: AP, lateral, bilateral oblique and coned down projections    IMAGES: 5    FINDINGS:    Stable grade 1 anterolisthesis L4 on L5  Osseous structures demineralized  Severe compression fracture of T12 with vertebral plana  Mild bony retropulsion of the posterior aspect of the T12 vertebral body  Progressive disc space narrowing diffusely throughout the lumbar spine, most pronounced L5-S1  Vacuum disc L5-S1  Diffuse facet hypertrophic changes throughout the lumbar spine, most pronounced L3-L4, L4-L5 and L5-S1  Postoperative changes at L3-L4  and L4-L5  Hardware appears stable  Visualized soft tissues appear unremarkable  IMPRESSION:  1  Severe compression fracture with vertebral plana involving the T12 vertebral body  Bony retropulsion of the posterior aspect of T12  These findings are new when compared to the prior study  2  Progressive degenerative changes of the lumbar spine  MRI:  MRI Lumbar Spine (9/6/2014)  Moderate to severe facet arthropathy with severe central canal stenosis  Moderate to severe facet arthropathy with moderate to severe central canal stenosis  Mild bulging disc with mild facet arthropathy  compression fracture        Signatures   Electronically signed by : Sudheer Melvin MD; Nov 7 2017  1:53PM EST                       (Author)

## 2017-11-21 ENCOUNTER — ALLSCRIPTS OFFICE VISIT (OUTPATIENT)
Dept: RADIOLOGY | Facility: CLINIC | Age: 82
End: 2017-11-21
Payer: MEDICARE

## 2017-12-12 ENCOUNTER — APPOINTMENT (EMERGENCY)
Dept: CT IMAGING | Facility: HOSPITAL | Age: 82
DRG: 312 | End: 2017-12-12
Payer: MEDICARE

## 2017-12-12 ENCOUNTER — APPOINTMENT (EMERGENCY)
Dept: RADIOLOGY | Facility: HOSPITAL | Age: 82
DRG: 312 | End: 2017-12-12
Payer: MEDICARE

## 2017-12-12 ENCOUNTER — GENERIC CONVERSION - ENCOUNTER (OUTPATIENT)
Dept: OTHER | Facility: OTHER | Age: 82
End: 2017-12-12

## 2017-12-12 ENCOUNTER — GENERIC CONVERSION - ENCOUNTER (OUTPATIENT)
Dept: CARDIOLOGY CLINIC | Facility: HOSPITAL | Age: 82
End: 2017-12-12

## 2017-12-12 ENCOUNTER — HOSPITAL ENCOUNTER (INPATIENT)
Facility: HOSPITAL | Age: 82
LOS: 2 days | Discharge: HOME HEALTH | DRG: 312 | End: 2017-12-14
Attending: EMERGENCY MEDICINE | Admitting: FAMILY MEDICINE
Payer: MEDICARE

## 2017-12-12 DIAGNOSIS — R26.2 AMBULATORY DYSFUNCTION: Primary | ICD-10-CM

## 2017-12-12 DIAGNOSIS — R29.6 FREQUENT FALLS: ICD-10-CM

## 2017-12-12 DIAGNOSIS — R42 DIZZINESS: ICD-10-CM

## 2017-12-12 PROBLEM — R55 POSTURAL DIZZINESS WITH NEAR SYNCOPE: Status: ACTIVE | Noted: 2017-12-12

## 2017-12-12 PROBLEM — I48.0 PAROXYSMAL ATRIAL FIBRILLATION (HCC): Chronic | Status: ACTIVE | Noted: 2017-12-12

## 2017-12-12 PROBLEM — S63.501A RIGHT WRIST SPRAIN: Status: ACTIVE | Noted: 2017-12-12

## 2017-12-12 PROBLEM — I48.91 ATRIAL FIBRILLATION (HCC): Chronic | Status: ACTIVE | Noted: 2017-12-12

## 2017-12-12 LAB
ALBUMIN SERPL BCP-MCNC: 3.1 G/DL (ref 3.5–5)
ALP SERPL-CCNC: 77 U/L (ref 46–116)
ALT SERPL W P-5'-P-CCNC: 29 U/L (ref 12–78)
ANION GAP SERPL CALCULATED.3IONS-SCNC: 8 MMOL/L (ref 4–13)
APTT PPP: 25 SECONDS (ref 23–35)
AST SERPL W P-5'-P-CCNC: 30 U/L (ref 5–45)
ATRIAL RATE: 0 BPM
ATRIAL RATE: 56 BPM
ATRIAL RATE: 57 BPM
ATRIAL RATE: 57 BPM
BASOPHILS # BLD AUTO: 0.02 THOUSANDS/ΜL (ref 0–0.1)
BASOPHILS NFR BLD AUTO: 0 % (ref 0–1)
BILIRUB SERPL-MCNC: 0.6 MG/DL (ref 0.2–1)
BUN SERPL-MCNC: 20 MG/DL (ref 5–25)
CALCIUM SERPL-MCNC: 8.3 MG/DL (ref 8.3–10.1)
CHLORIDE SERPL-SCNC: 99 MMOL/L (ref 100–108)
CLARITY, POC: CLEAR
CO2 SERPL-SCNC: 26 MMOL/L (ref 21–32)
COLOR, POC: YELLOW
CREAT SERPL-MCNC: 0.97 MG/DL (ref 0.6–1.3)
EOSINOPHIL # BLD AUTO: 0.02 THOUSAND/ΜL (ref 0–0.61)
EOSINOPHIL NFR BLD AUTO: 0 % (ref 0–6)
ERYTHROCYTE [DISTWIDTH] IN BLOOD BY AUTOMATED COUNT: 14.5 % (ref 11.6–15.1)
EXT BILIRUBIN, UA: NEGATIVE
EXT BLOOD URINE: NEGATIVE
EXT GLUCOSE, UA: NEGATIVE
EXT KETONES: NEGATIVE
EXT NITRITE, UA: NEGATIVE
EXT PH, UA: 6
EXT PROTEIN, UA: NEGATIVE
EXT SPECIFIC GRAVITY, UA: 1.01
EXT UROBILINOGEN: NEGATIVE
GFR SERPL CREATININE-BSD FRML MDRD: 51 ML/MIN/1.73SQ M
GLUCOSE SERPL-MCNC: 107 MG/DL (ref 65–140)
HCT VFR BLD AUTO: 36.9 % (ref 34.8–46.1)
HGB BLD-MCNC: 13.2 G/DL (ref 11.5–15.4)
INR PPP: 0.89 (ref 0.86–1.16)
LYMPHOCYTES # BLD AUTO: 1.78 THOUSANDS/ΜL (ref 0.6–4.47)
LYMPHOCYTES NFR BLD AUTO: 23 % (ref 14–44)
MCH RBC QN AUTO: 32 PG (ref 26.8–34.3)
MCHC RBC AUTO-ENTMCNC: 35.8 G/DL (ref 31.4–37.4)
MCV RBC AUTO: 90 FL (ref 82–98)
MONOCYTES # BLD AUTO: 0.62 THOUSAND/ΜL (ref 0.17–1.22)
MONOCYTES NFR BLD AUTO: 8 % (ref 4–12)
NEUTROPHILS # BLD AUTO: 5.24 THOUSANDS/ΜL (ref 1.85–7.62)
NEUTS SEG NFR BLD AUTO: 69 % (ref 43–75)
P AXIS: 31 DEGREES
P AXIS: 71 DEGREES
P AXIS: 74 DEGREES
PLATELET # BLD AUTO: 195 THOUSANDS/UL (ref 149–390)
PLATELET # BLD AUTO: 210 THOUSANDS/UL (ref 149–390)
PMV BLD AUTO: 9.1 FL (ref 8.9–12.7)
PMV BLD AUTO: 9.3 FL (ref 8.9–12.7)
POTASSIUM SERPL-SCNC: 4.4 MMOL/L (ref 3.5–5.3)
PR INTERVAL: 304 MS
PR INTERVAL: 312 MS
PR INTERVAL: 312 MS
PROT SERPL-MCNC: 7 G/DL (ref 6.4–8.2)
PROTHROMBIN TIME: 12.3 SECONDS (ref 12.1–14.4)
QRS AXIS: -42 DEGREES
QRS AXIS: -44 DEGREES
QRS AXIS: -49 DEGREES
QRS AXIS: 0 DEGREES
QRSD INTERVAL: 0 MS
QRSD INTERVAL: 90 MS
QRSD INTERVAL: 94 MS
QRSD INTERVAL: 96 MS
QT INTERVAL: 0 MS
QT INTERVAL: 422 MS
QT INTERVAL: 426 MS
QT INTERVAL: 428 MS
QTC INTERVAL: 0 MS
QTC INTERVAL: 410 MS
QTC INTERVAL: 411 MS
QTC INTERVAL: 416 MS
RBC # BLD AUTO: 4.12 MILLION/UL (ref 3.81–5.12)
SODIUM SERPL-SCNC: 133 MMOL/L (ref 136–145)
T WAVE AXIS: 0 DEGREES
T WAVE AXIS: 24 DEGREES
T WAVE AXIS: 43 DEGREES
T WAVE AXIS: 55 DEGREES
TROPONIN I SERPL-MCNC: <0.02 NG/ML
VENTRICULAR RATE: 0 BPM
VENTRICULAR RATE: 56 BPM
VENTRICULAR RATE: 57 BPM
VENTRICULAR RATE: 57 BPM
WBC # BLD AUTO: 7.68 THOUSAND/UL (ref 4.31–10.16)
WBC # BLD EST: NEGATIVE 10*3/UL

## 2017-12-12 PROCEDURE — 85610 PROTHROMBIN TIME: CPT | Performed by: PHYSICIAN ASSISTANT

## 2017-12-12 PROCEDURE — 81002 URINALYSIS NONAUTO W/O SCOPE: CPT | Performed by: PHYSICIAN ASSISTANT

## 2017-12-12 PROCEDURE — 84484 ASSAY OF TROPONIN QUANT: CPT | Performed by: PHYSICIAN ASSISTANT

## 2017-12-12 PROCEDURE — 96361 HYDRATE IV INFUSION ADD-ON: CPT

## 2017-12-12 PROCEDURE — 85025 COMPLETE CBC W/AUTO DIFF WBC: CPT | Performed by: PHYSICIAN ASSISTANT

## 2017-12-12 PROCEDURE — 85730 THROMBOPLASTIN TIME PARTIAL: CPT | Performed by: PHYSICIAN ASSISTANT

## 2017-12-12 PROCEDURE — 71020 HB CHEST X-RAY 2VW FRONTAL&LATL: CPT

## 2017-12-12 PROCEDURE — 96360 HYDRATION IV INFUSION INIT: CPT

## 2017-12-12 PROCEDURE — 93005 ELECTROCARDIOGRAM TRACING: CPT

## 2017-12-12 PROCEDURE — 73130 X-RAY EXAM OF HAND: CPT

## 2017-12-12 PROCEDURE — 73502 X-RAY EXAM HIP UNI 2-3 VIEWS: CPT

## 2017-12-12 PROCEDURE — 80053 COMPREHEN METABOLIC PANEL: CPT | Performed by: PHYSICIAN ASSISTANT

## 2017-12-12 PROCEDURE — 73030 X-RAY EXAM OF SHOULDER: CPT

## 2017-12-12 PROCEDURE — 73110 X-RAY EXAM OF WRIST: CPT

## 2017-12-12 PROCEDURE — 36415 COLL VENOUS BLD VENIPUNCTURE: CPT | Performed by: PHYSICIAN ASSISTANT

## 2017-12-12 PROCEDURE — 93005 ELECTROCARDIOGRAM TRACING: CPT | Performed by: PHYSICIAN ASSISTANT

## 2017-12-12 PROCEDURE — 70450 CT HEAD/BRAIN W/O DYE: CPT

## 2017-12-12 PROCEDURE — 84484 ASSAY OF TROPONIN QUANT: CPT | Performed by: FAMILY MEDICINE

## 2017-12-12 PROCEDURE — 85049 AUTOMATED PLATELET COUNT: CPT | Performed by: PHYSICIAN ASSISTANT

## 2017-12-12 PROCEDURE — 99285 EMERGENCY DEPT VISIT HI MDM: CPT

## 2017-12-12 RX ORDER — MECLIZINE HCL 12.5 MG/1
12.5 TABLET ORAL EVERY 8 HOURS SCHEDULED
Status: DISCONTINUED | OUTPATIENT
Start: 2017-12-12 | End: 2017-12-14 | Stop reason: HOSPADM

## 2017-12-12 RX ORDER — CHLORAL HYDRATE 500 MG
1000 CAPSULE ORAL 2 TIMES DAILY
Status: DISCONTINUED | OUTPATIENT
Start: 2017-12-12 | End: 2017-12-14 | Stop reason: HOSPADM

## 2017-12-12 RX ORDER — LOTEPREDNOL ETABONATE 5 MG/ML
1 SUSPENSION/ DROPS OPHTHALMIC 3 TIMES DAILY
Status: DISCONTINUED | OUTPATIENT
Start: 2017-12-12 | End: 2017-12-14 | Stop reason: HOSPADM

## 2017-12-12 RX ORDER — METOPROLOL SUCCINATE 50 MG/1
50 TABLET, EXTENDED RELEASE ORAL DAILY
Status: DISCONTINUED | OUTPATIENT
Start: 2017-12-13 | End: 2017-12-12

## 2017-12-12 RX ORDER — LISINOPRIL 5 MG/1
5 TABLET ORAL DAILY
Status: DISCONTINUED | OUTPATIENT
Start: 2017-12-13 | End: 2017-12-14 | Stop reason: HOSPADM

## 2017-12-12 RX ORDER — METOPROLOL SUCCINATE 25 MG/1
25 TABLET, EXTENDED RELEASE ORAL DAILY
Status: DISCONTINUED | OUTPATIENT
Start: 2017-12-13 | End: 2017-12-14 | Stop reason: HOSPADM

## 2017-12-12 RX ORDER — MAGNESIUM HYDROXIDE/ALUMINUM HYDROXICE/SIMETHICONE 120; 1200; 1200 MG/30ML; MG/30ML; MG/30ML
30 SUSPENSION ORAL EVERY 6 HOURS PRN
Status: DISCONTINUED | OUTPATIENT
Start: 2017-12-12 | End: 2017-12-14 | Stop reason: HOSPADM

## 2017-12-12 RX ORDER — ROPINIROLE 2 MG/1
2 TABLET, FILM COATED, EXTENDED RELEASE ORAL DAILY
Status: DISCONTINUED | OUTPATIENT
Start: 2017-12-13 | End: 2017-12-14 | Stop reason: HOSPADM

## 2017-12-12 RX ORDER — DORZOLAMIDE HYDROCHLORIDE AND TIMOLOL MALEATE 20; 5 MG/ML; MG/ML
1 SOLUTION/ DROPS OPHTHALMIC 2 TIMES DAILY
COMMUNITY

## 2017-12-12 RX ORDER — GABAPENTIN 100 MG/1
100 CAPSULE ORAL 2 TIMES DAILY
Status: DISCONTINUED | OUTPATIENT
Start: 2017-12-12 | End: 2017-12-14 | Stop reason: HOSPADM

## 2017-12-12 RX ORDER — ONDANSETRON 2 MG/ML
4 INJECTION INTRAMUSCULAR; INTRAVENOUS EVERY 6 HOURS PRN
Status: DISCONTINUED | OUTPATIENT
Start: 2017-12-12 | End: 2017-12-14 | Stop reason: HOSPADM

## 2017-12-12 RX ORDER — DOCUSATE SODIUM 100 MG/1
100 CAPSULE, LIQUID FILLED ORAL 2 TIMES DAILY
Status: DISCONTINUED | OUTPATIENT
Start: 2017-12-12 | End: 2017-12-14 | Stop reason: HOSPADM

## 2017-12-12 RX ORDER — ASPIRIN 81 MG/1
162 TABLET, CHEWABLE ORAL DAILY
Status: DISCONTINUED | OUTPATIENT
Start: 2017-12-13 | End: 2017-12-14 | Stop reason: HOSPADM

## 2017-12-12 RX ORDER — ALPRAZOLAM 0.25 MG/1
0.25 TABLET ORAL
Status: DISCONTINUED | OUTPATIENT
Start: 2017-12-12 | End: 2017-12-14 | Stop reason: HOSPADM

## 2017-12-12 RX ORDER — LOTEPREDNOL ETABONATE 5 MG/ML
1 SUSPENSION/ DROPS OPHTHALMIC 3 TIMES DAILY
COMMUNITY
End: 2019-06-08 | Stop reason: HOSPADM

## 2017-12-12 RX ORDER — HEPARIN SODIUM 5000 [USP'U]/ML
5000 INJECTION, SOLUTION INTRAVENOUS; SUBCUTANEOUS EVERY 8 HOURS SCHEDULED
Status: DISCONTINUED | OUTPATIENT
Start: 2017-12-12 | End: 2017-12-12

## 2017-12-12 RX ORDER — DORZOLAMIDE HYDROCHLORIDE AND TIMOLOL MALEATE 20; 5 MG/ML; MG/ML
1 SOLUTION/ DROPS OPHTHALMIC 2 TIMES DAILY
Status: DISCONTINUED | OUTPATIENT
Start: 2017-12-12 | End: 2017-12-14 | Stop reason: HOSPADM

## 2017-12-12 RX ORDER — ACETAMINOPHEN 325 MG/1
650 TABLET ORAL EVERY 6 HOURS PRN
Status: DISCONTINUED | OUTPATIENT
Start: 2017-12-12 | End: 2017-12-14 | Stop reason: HOSPADM

## 2017-12-12 RX ORDER — FAMOTIDINE 20 MG/1
20 TABLET, FILM COATED ORAL 2 TIMES DAILY
Status: DISCONTINUED | OUTPATIENT
Start: 2017-12-12 | End: 2017-12-14 | Stop reason: HOSPADM

## 2017-12-12 RX ORDER — AMIODARONE HYDROCHLORIDE 200 MG/1
200 TABLET ORAL DAILY
Status: DISCONTINUED | OUTPATIENT
Start: 2017-12-13 | End: 2017-12-14 | Stop reason: HOSPADM

## 2017-12-12 RX ADMIN — DOCUSATE SODIUM 100 MG: 100 CAPSULE, LIQUID FILLED ORAL at 19:18

## 2017-12-12 RX ADMIN — MECLIZINE 12.5 MG: 12.5 TABLET ORAL at 19:18

## 2017-12-12 RX ADMIN — GABAPENTIN 100 MG: 100 CAPSULE ORAL at 19:18

## 2017-12-12 RX ADMIN — SODIUM CHLORIDE 1000 ML: 0.9 INJECTION, SOLUTION INTRAVENOUS at 09:18

## 2017-12-12 RX ADMIN — DORZOLAMIDE HYDROCHLORIDE AND TIMOLOL MALEATE 1 DROP: 20; 5 SOLUTION/ DROPS OPHTHALMIC at 19:21

## 2017-12-12 RX ADMIN — HEPARIN SODIUM 5000 UNITS: 5000 INJECTION, SOLUTION INTRAVENOUS; SUBCUTANEOUS at 19:21

## 2017-12-12 RX ADMIN — FAMOTIDINE 20 MG: 20 TABLET, FILM COATED ORAL at 19:18

## 2017-12-12 RX ADMIN — Medication 1000 MG: at 19:18

## 2017-12-12 NOTE — ED NOTES
ORTHOSTATIC VITAL SIGNS BLOOD PRESSURE HEART RATE           LYING 196/91 55           SITTING 185/81 56           STANDING 175/75 184 G  [facility]  12/12/17 [id number]

## 2017-12-12 NOTE — ED NOTES
Pt OOB with walker and assist of tech, pt ambulated around ER without difficulty, pt returned to bed in position of comfort, no needs at this time     Lesly Keenan, RN  12/12/17 1212

## 2017-12-12 NOTE — ED PROVIDER NOTES
History  Chief Complaint   Patient presents with   Kiana Lopez Fall     Per daughter pt got dizzy yesterday and fell on her right arm  Pt has been having frequent dizzy spells  25-year-old female presents to the emergency department after a fall  States that she fell in the bathroom last night and landed on her right side injuring her wrist and shoulder  Also complains of some mild pain in the right hip  Patient's daughter is at bedside and states that the patient has been getting dizzy spells intermittently for a long time but that they seem to have gotten more frequent  States that she heard her fall yesterday and went to the bathroom to help her out  Noticed today that her hand and wrist were bruised and swollen  Patient is also complaining of increased pain in the right shoulder  She denies head injury  She has not had any fevers at home or other signs of infection  States that she has had some nausea earlier today but denies chest pain or difficulty breathing  History provided by:  Patient   used: No    Fall   Mechanism of injury: fall    Injury location:  Shoulder/arm and hand  Incident location:  Bathroom  Time since incident:  1 day  Arrived directly from scene: no    Associated symptoms: no abdominal pain, no chest pain, no headaches, no nausea, no seizures and no vomiting        Prior to Admission Medications   Prescriptions Last Dose Informant Patient Reported? Taking?    ALPRAZolam (XANAX) 0 25 mg tablet   Yes Yes   Sig: Take 0 25 mg by mouth daily at bedtime   HYDROcodone-acetaminophen (NORCO) 5-325 mg per tablet   Yes Yes   Sig: Take by mouth 3 (three) times a day as needed   Magnesium 400 MG CAPS   Yes Yes   Sig: Take 400 mg by mouth daily   Omega-3 Fatty Acids (FISH OIL) 1,000 mg   Yes Yes   Sig: Take 1,000 mg by mouth 2 (two) times a day   amiodarone 200 mg tablet   Yes Yes   Sig: Take 200 mg by mouth daily   aspirin 81 MG tablet   Yes Yes   Sig: Take 81 mg by mouth 2 (two) times a day   gabapentin (NEURONTIN) 100 mg capsule   Yes Yes   Sig: Take 100 mg by mouth 2 (two) times a day   lisinopril (ZESTRIL) 5 mg tablet   Yes Yes   Sig: Take 5 mg by mouth daily   metoprolol succinate (TOPROL-XL) 50 mg 24 hr tablet   Yes Yes   Sig: Take 50 mg by mouth daily   rOPINIRole (REQUIP XL) 2 MG 24 hr tablet   Yes Yes   Sig: Take 2 mg by mouth daily   traZODone (DESYREL) 50 mg tablet   Yes Yes   Sig: Take 50 mg by mouth daily at bedtime      Facility-Administered Medications: None       Past Medical History:   Diagnosis Date    Chronic pain     back    Hypertension     Rotator cuff injury        Past Surgical History:   Procedure Laterality Date    APPENDECTOMY      BACK SURGERY      EYE SURGERY         History reviewed  No pertinent family history  I have reviewed and agree with the history as documented  Social History   Substance Use Topics    Smoking status: Never Smoker    Smokeless tobacco: Never Used    Alcohol use No        Review of Systems   Constitutional: Negative for activity change, appetite change, chills and fever  HENT: Negative for congestion, dental problem, drooling, ear discharge, ear pain, mouth sores, nosebleeds, rhinorrhea, sore throat and trouble swallowing  Eyes: Negative for pain, discharge and itching  Respiratory: Negative for cough, chest tightness, shortness of breath and wheezing  Cardiovascular: Negative for chest pain and palpitations  Gastrointestinal: Negative for abdominal pain, blood in stool, constipation, diarrhea, nausea and vomiting  Endocrine: Negative for cold intolerance and heat intolerance  Genitourinary: Negative for difficulty urinating, dysuria, flank pain, frequency and urgency  Musculoskeletal:        Wrist pain, shoulder pain, hip pain   Skin: Negative for rash and wound  Allergic/Immunologic: Negative for food allergies and immunocompromised state  Neurological: Positive for dizziness   Negative for seizures, syncope, weakness, numbness and headaches  Psychiatric/Behavioral: Negative for agitation, behavioral problems and confusion  Physical Exam  ED Triage Vitals   Temperature Pulse Respirations Blood Pressure SpO2   12/12/17 0842 12/12/17 0831 12/12/17 0831 12/12/17 0831 12/12/17 0831   98 2 °F (36 8 °C) 60 14 (!) 213/94 95 %      Temp Source Heart Rate Source Patient Position - Orthostatic VS BP Location FiO2 (%)   12/12/17 0842 12/12/17 0831 12/12/17 0831 12/12/17 0831 --   Oral Monitor Lying Left arm       Pain Score       12/12/17 0831       8           Orthostatic Vital Signs  Vitals:    12/12/17 0831 12/12/17 1120   BP: (!) 213/94 (!) 152/102   Pulse: 60 56   Patient Position - Orthostatic VS: Lying Lying       Physical Exam   Constitutional: She is oriented to person, place, and time  She appears well-developed and well-nourished  No distress  HENT:   Head: Normocephalic and atraumatic  Right Ear: External ear normal    Left Ear: External ear normal    Mouth/Throat: Oropharynx is clear and moist  No oropharyngeal exudate  Eyes: Conjunctivae and EOM are normal  Pupils are equal, round, and reactive to light  Neck: No JVD present  No tracheal deviation present  Cardiovascular: Normal rate, regular rhythm and normal heart sounds  Exam reveals no gallop and no friction rub  No murmur heard  Pulmonary/Chest: Effort normal and breath sounds normal  No respiratory distress  She has no wheezes  She has no rales  She exhibits no tenderness  Abdominal: Soft  Bowel sounds are normal  She exhibits no distension  There is no tenderness  There is no guarding  Musculoskeletal: She exhibits no edema or deformity  Right shoulder: She exhibits decreased range of motion, tenderness, bony tenderness and swelling  She exhibits no effusion, no crepitus, no deformity, no laceration, no pain, no spasm, normal pulse and normal strength          Right wrist: She exhibits decreased range of motion (echymosis), tenderness and bony tenderness  She exhibits no swelling, no effusion, no crepitus, no deformity and no laceration  Lymphadenopathy:     She has no cervical adenopathy  Neurological: She is alert and oriented to person, place, and time  Skin: Skin is warm and dry  No rash noted  She is not diaphoretic  No erythema  Psychiatric: She has a normal mood and affect  Her behavior is normal    Nursing note and vitals reviewed  ED Medications  Medications   sodium chloride 0 9 % bolus 1,000 mL (1,000 mL Intravenous New Bag 12/12/17 0918)       Diagnostic Studies  Results Reviewed     Procedure Component Value Units Date/Time    Troponin I [[id number]]  (Normal) Collected:  12/12/17 0919    Lab Status:  Final result Specimen:  Blood from Arm, Left Updated:  12/12/17 0948     Troponin I <0 02 ng/mL     Narrative:         [employer] Chemistry analyzer 99% cutoff is > 0 04 ng/mL in network labs    o cTnI 99% cutoff is useful only when applied to patients in the clinical setting of myocardial ischemia  o cTnI 99% cutoff should be interpreted in the context of clinical history, ECG findings and possibly cardiac imaging to establish correct diagnosis  o cTnI 99% cutoff may be suggestive but clearly not indicative of a coronary event without the clinical setting of myocardial ischemia      Comprehensive metabolic panel [[id number]]  (Abnormal) Collected:  12/12/17 0919    Lab Status:  Final result Specimen:  Blood from Arm, Left Updated:  12/12/17 0946     Sodium 133 (L) mmol/L      Potassium 4 4 mmol/L      Chloride 99 (L) mmol/L      CO2 26 mmol/L      Anion Gap 8 mmol/L      BUN 20 mg/dL      Creatinine 0 97 mg/dL      Glucose 107 mg/dL      Calcium 8 3 mg/dL      AST 30 U/L      ALT 29 U/L      Alkaline Phosphatase 77 U/L      Total Protein 7 0 g/dL      Albumin 3 1 (L) g/dL      Total Bilirubin 0 60 mg/dL      eGFR 51 ml/min/1 73sq m     Narrative:         National Kidney Disease Education Program recommendations are as follows:  GFR calculation is accurate only with a steady state creatinine  Chronic Kidney disease less than 60 ml/min/1 73 sq  meters  Kidney failure less than 15 ml/min/1 73 sq  meters  Yoli Black [[id number]]  (Normal) Collected:  12/12/17 0919    Lab Status:  Final result Specimen:  Blood from Arm, Left Updated:  12/12/17 0937     Protime 12 3 seconds      INR 0 89    APTT [[id number]]  (Normal) Collected:  12/12/17 0919    Lab Status:  Final result Specimen:  Blood from Arm, Left Updated:  12/12/17 0937     PTT 25 seconds     Narrative: Therapeutic Heparin Range = 60-90 seconds    CBC and differential [[id number]]  (Normal) Collected:  12/12/17 0919    Lab Status:  Final result Specimen:  Blood from Arm, Left Updated:  12/12/17 0929     WBC 7 68 Thousand/uL      RBC 4 12 Million/uL      Hemoglobin 13 2 g/dL      Hematocrit 36 9 %      MCV 90 fL      MCH 32 0 pg      MCHC 35 8 g/dL      RDW 14 5 %      MPV 9 3 fL      Platelets 252 Thousands/uL      Neutrophils Relative 69 %      Lymphocytes Relative 23 %      Monocytes Relative 8 %      Eosinophils Relative 0 %      Basophils Relative 0 %      Neutrophils Absolute 5 24 Thousands/µL      Lymphocytes Absolute 1 78 Thousands/µL      Monocytes Absolute 0 62 Thousand/µL      Eosinophils Absolute 0 02 Thousand/µL      Basophils Absolute 0 02 Thousands/µL     POCT urinalysis dipstick [[id number]]     Lab Status:  No result Specimen:  Urine                  CT head without contrast   Final Result by Vikki Bui MD (12/12 1012)      No acute intracranial abnormality  Microangiopathic changes  Workstation performed: [id number]         XR chest 2 views   Final Result by IFEANYI Hou MD (12/12 1005)      No active pulmonary disease            Workstation performed: [id number]         XR wrist 3+ views RIGHT   Final Result by IFEANYI Hou MD (12/12 1004)      No acute osseous abnormality        Soft tissue swelling of the thumb  Arthritic changes, radiocarpal joint spaces  Osteopenia  Workstation performed: [id number]         XR shoulder 2+ views RIGHT   Final Result by IFEANYI Morataya MD (12/12 1002)      Degenerative changes, acromio clavicular joint and glenohumeral joint  Narrowing of the acromiohumeral joint space may indicate rotator cuff tear and/or degeneration  Workstation performed: [id number]         XR hand 3+ views RIGHT   Final Result by IFEANYI Morataya MD (12/12 1001)      No acute fracture  Old fractures of the distal radial metaphysis and ulnar styloid process  Osteopenia  Workstation performed: [id number]         XR hip/pelv 2-3 vws right   Final Result by IFEANYI Morataya MD (12/12 3471)      Negative for right hip fracture  Workstation performed: [id number]                    Procedures  ECG 12 Lead Documentation  Date/Time: 12/12/2017 9:15 AM  Performed by: Omar Hahn  Authorized by: Earl Colbert     Indications / Diagnosis:  Dizziness  ECG reviewed by me, the ED Provider: yes    Patient location:  ED  Previous ECG:     Previous ECG:  Compared to current    Comparison ECG info:  12/25/16    Similarity:  No change    Comparison to cardiac monitor: Yes    Interpretation:     Interpretation: abnormal    Rate:     ECG rate:  56    ECG rate assessment: bradycardic    Rhythm:     Rhythm: sinus bradycardia    Ectopy:     Ectopy: none    QRS:     QRS axis:  Left    QRS intervals:  Normal  Conduction:     Conduction: abnormal      Abnormal conduction: 1st degree    ST segments:     ST segments:  Normal  T waves:     T waves: normal             Phone Contacts  ED Phone Contact    ED Course  ED Course as of Dec 12 1215   Tue Dec 12, 2017   1137 Discussed testResults with patient and her daughter  Still resistant to inpatient physical therapy  Will ambulate in the department      1202 Discussed results and treatment plan with the patient via [employer]  #[id number]  Agreeable to inpatient admission for physical therapy evaluation and possible inpatient physical therapy thereafter due to frequent falls at home  MDM  Number of Diagnoses or Management Options  Ambulatory dysfunction:   Dizziness:   Frequent falls:   Diagnosis management comments: Differential diagnosis includes but not limited to:  Dizziness, vertigo, TIA, CVA, dysrhythmia, electrolyte abnormality, anemia, contusion, fracture  Patient ambulatory in the department with some difficulty with a walker due to dizziness  Amount and/or Complexity of Data Reviewed  Clinical lab tests: ordered and reviewed  Tests in the radiology section of CPT®: ordered and reviewed  Obtain history from someone other than the patient: yes  Discuss the patient with other providers: yes  Independent visualization of images, tracings, or specimens: yes      CritCare Time    Disposition  Final diagnoses:   Ambulatory dysfunction   Frequent falls   Dizziness     Time reflects when diagnosis was documented in both MDM as applicable and the Disposition within this note     Time User Action Codes Description Comment    12/12/2017 12:06 PM Sushil Francis 26 [R26 2] Ambulatory dysfunction     12/12/2017 12:06 PM Shital Chin Add [R29 6] Frequent falls     12/12/2017 12:06 PM Pushpa LUTHER Add [R42] Dizziness       ED Disposition     ED Disposition Condition Comment    Admit  Case was discussed with [facility] and the patient's admission status was agreed to be Admission Status: inpatient status to the service of Dr Erika Gaytan   Follow-up Information    None       Patient's Medications   Discharge Prescriptions    No medications on file     No discharge procedures on file      ED Provider  Electronically Signed by           Marek Germain PA-C  12/12/17 6280

## 2017-12-12 NOTE — ED NOTES
Pt given lunch tray, set up and eating without difficulty with help of daughter     Markus Frazier, RN  12/12/17 4786

## 2017-12-13 LAB
ALBUMIN SERPL BCP-MCNC: 2.6 G/DL (ref 3.5–5)
ALP SERPL-CCNC: 62 U/L (ref 46–116)
ALT SERPL W P-5'-P-CCNC: 21 U/L (ref 12–78)
ANION GAP SERPL CALCULATED.3IONS-SCNC: 7 MMOL/L (ref 4–13)
AST SERPL W P-5'-P-CCNC: 21 U/L (ref 5–45)
BILIRUB SERPL-MCNC: 0.6 MG/DL (ref 0.2–1)
BUN SERPL-MCNC: 15 MG/DL (ref 5–25)
CALCIUM SERPL-MCNC: 8 MG/DL (ref 8.3–10.1)
CHLORIDE SERPL-SCNC: 104 MMOL/L (ref 100–108)
CO2 SERPL-SCNC: 26 MMOL/L (ref 21–32)
CREAT SERPL-MCNC: 0.94 MG/DL (ref 0.6–1.3)
ERYTHROCYTE [DISTWIDTH] IN BLOOD BY AUTOMATED COUNT: 14.6 % (ref 11.6–15.1)
FERRITIN SERPL-MCNC: 157 NG/ML (ref 8–388)
GFR SERPL CREATININE-BSD FRML MDRD: 53 ML/MIN/1.73SQ M
GLUCOSE SERPL-MCNC: 86 MG/DL (ref 65–140)
HCT VFR BLD AUTO: 34.6 % (ref 34.8–46.1)
HGB BLD-MCNC: 11.4 G/DL (ref 11.5–15.4)
IRON SATN MFR SERPL: 22 %
IRON SERPL-MCNC: 42 UG/DL (ref 50–170)
MAGNESIUM SERPL-MCNC: 1.8 MG/DL (ref 1.6–2.6)
MCH RBC QN AUTO: 31.1 PG (ref 26.8–34.3)
MCHC RBC AUTO-ENTMCNC: 32.9 G/DL (ref 31.4–37.4)
MCV RBC AUTO: 94 FL (ref 82–98)
PLATELET # BLD AUTO: 169 THOUSANDS/UL (ref 149–390)
PMV BLD AUTO: 9.5 FL (ref 8.9–12.7)
POTASSIUM SERPL-SCNC: 3.7 MMOL/L (ref 3.5–5.3)
PROT SERPL-MCNC: 5.8 G/DL (ref 6.4–8.2)
RBC # BLD AUTO: 3.67 MILLION/UL (ref 3.81–5.12)
SODIUM SERPL-SCNC: 137 MMOL/L (ref 136–145)
TIBC SERPL-MCNC: 191 UG/DL (ref 250–450)
WBC # BLD AUTO: 6.26 THOUSAND/UL (ref 4.31–10.16)

## 2017-12-13 PROCEDURE — 83540 ASSAY OF IRON: CPT | Performed by: PHYSICIAN ASSISTANT

## 2017-12-13 PROCEDURE — 83550 IRON BINDING TEST: CPT | Performed by: PHYSICIAN ASSISTANT

## 2017-12-13 PROCEDURE — 83735 ASSAY OF MAGNESIUM: CPT | Performed by: PHYSICIAN ASSISTANT

## 2017-12-13 PROCEDURE — 82728 ASSAY OF FERRITIN: CPT | Performed by: PHYSICIAN ASSISTANT

## 2017-12-13 PROCEDURE — 85027 COMPLETE CBC AUTOMATED: CPT | Performed by: PHYSICIAN ASSISTANT

## 2017-12-13 PROCEDURE — 80053 COMPREHEN METABOLIC PANEL: CPT | Performed by: PHYSICIAN ASSISTANT

## 2017-12-13 RX ADMIN — ALUMINUM HYDROXIDE, MAGNESIUM HYDROXIDE, AND SIMETHICONE 30 ML: 200; 200; 20 SUSPENSION ORAL at 16:34

## 2017-12-13 RX ADMIN — DOCUSATE SODIUM 100 MG: 100 CAPSULE, LIQUID FILLED ORAL at 16:34

## 2017-12-13 RX ADMIN — LISINOPRIL 5 MG: 5 TABLET ORAL at 08:14

## 2017-12-13 RX ADMIN — ALPRAZOLAM 0.25 MG: 0.25 TABLET ORAL at 21:34

## 2017-12-13 RX ADMIN — GABAPENTIN 100 MG: 100 CAPSULE ORAL at 16:35

## 2017-12-13 RX ADMIN — GABAPENTIN 100 MG: 100 CAPSULE ORAL at 08:14

## 2017-12-13 RX ADMIN — FAMOTIDINE 20 MG: 20 TABLET, FILM COATED ORAL at 16:35

## 2017-12-13 RX ADMIN — MECLIZINE 12.5 MG: 12.5 TABLET ORAL at 21:34

## 2017-12-13 RX ADMIN — DOCUSATE SODIUM 100 MG: 100 CAPSULE, LIQUID FILLED ORAL at 08:14

## 2017-12-13 RX ADMIN — ASPIRIN 81 MG 162 MG: 81 TABLET ORAL at 08:14

## 2017-12-13 RX ADMIN — Medication 400 MG: at 08:14

## 2017-12-13 RX ADMIN — AMIODARONE HYDROCHLORIDE 200 MG: 200 TABLET ORAL at 08:14

## 2017-12-13 RX ADMIN — DORZOLAMIDE HYDROCHLORIDE AND TIMOLOL MALEATE 1 DROP: 20; 5 SOLUTION/ DROPS OPHTHALMIC at 08:18

## 2017-12-13 RX ADMIN — Medication 1000 MG: at 16:34

## 2017-12-13 RX ADMIN — MECLIZINE 12.5 MG: 12.5 TABLET ORAL at 13:28

## 2017-12-13 RX ADMIN — METOPROLOL SUCCINATE 25 MG: 25 TABLET, EXTENDED RELEASE ORAL at 08:14

## 2017-12-13 RX ADMIN — FAMOTIDINE 20 MG: 20 TABLET, FILM COATED ORAL at 08:14

## 2017-12-13 RX ADMIN — MECLIZINE 12.5 MG: 12.5 TABLET ORAL at 05:45

## 2017-12-13 RX ADMIN — Medication 1000 MG: at 08:14

## 2017-12-13 RX ADMIN — ACETAMINOPHEN 650 MG: 325 TABLET ORAL at 16:34

## 2017-12-13 NOTE — ASSESSMENT & PLAN NOTE
· Telemetry reviewed bradycardic 45-50 overnight   · Continue decreased home medication metoprolol 25 mg daily

## 2017-12-13 NOTE — PROGRESS NOTES
Progress Note Vernell Treviño 9/20/1926, 80 y o  female MRN: [id number]    Unit/Bed#: -01 Encounter: [id number]    Primary Care Provider: Cedric Robledo DO   Date and time admitted to hospital: 12/12/2017  8:25 AM        * Postural dizziness with near syncope   Assessment & Plan    · Secondary to orthostatic hypotension positive today not likely vasovagal given multiple falls and lightheadedness/dizziness is precipitated from positioning from seated to standing orthostatic BP positive in the ED  · Fecal occult pending  · Continue home medication-metoprolol succinate 25 mg q day  · PT/OT pending likely need home PT          Chronic pain   Assessment & Plan    · Admits to lower back pain significant history of spinal stenosis  · Follows outpatient neurosurgery Dr Connie Silva  · Continue home medication-Xanax 0 25 mg p r n  HS, Neurontin, Requip  Hold home medication-oxycodone        Right wrist sprain   Assessment & Plan    · Status post syncopal episode tender to touch right wrist/hand x-ray old chronic fracture with soft tissue swelling no acute bony abnormalities  · Continue supportive treatment elevate upper extremity with Ace wrap warm compresses  · Continue pain control-Tylenol        Hypertension   Assessment & Plan    · Stable BP  · Continue home medication-decrease metoprolol 25 mg p o  q day, lisinopril        Paroxysmal atrial fibrillation (HCC)   Assessment & Plan    · Telemetry reviewed bradycardic 45-50 overnight   · Continue decreased home medication metoprolol 25 mg daily            VTE Pharmacologic Prophylaxis:   Pharmacologic: Ambulate patient  Mechanical VTE Prophylaxis in Place: Yes    Patient Centered Rounds: I have performed bedside rounds with nursing staff today      Discussions with Specialists or Other Care Team Provider:  RN, dixie    Education and Discussions with Family / Patient:  Discussed with patient and patient's daughter at the bedside in regards to decreasing metoprolol and orthostatic hypotension precautions    Time Spent for Care: 20 minutes  More than 50% of total time spent on counseling and coordination of care as described above  Current Length of Stay: 1 day(s)    Current Patient Status: Inpatient   Certification Statement: The patient will continue to require additional inpatient hospital stay due to Persistent lightheadedness/dizziness requiring PT/OT eval    Discharge Plan:  Not medically stable for discharge at this moment likely discharge in the a m  pending PT/OT eval     Code Status: Level 3 - DNAR and DNI      Subjective:   Patient was seen and examined at the bedside with RN the same  Patient admitted to lightheadedness and dizziness upon waking however improved admitting to mild regurgitation following breakfast today  Patient denied abdominal pain, nausea vomiting or diarrhea dyspnea melena hemoptysis or hematemesis, chest pain, palpitations, shortness breath, headache or fevers/chills  Objective:     Vitals:   Temp (24hrs), Av 6 °F (37 °C), Min:97 8 °F (36 6 °C), Max:99 7 °F (37 6 °C)    HR:  [56-68] 68  Resp:  [18-19] 19  BP: (133-209)/(65-90) 160/80  SpO2:  [93 %-98 %] 98 %  Body mass index is 28 27 kg/m²  Input and Output Summary (last 24 hours): Intake/Output Summary (Last 24 hours) at 17 1701  Last data filed at 17 1422   Gross per 24 hour   Intake              940 ml   Output              750 ml   Net              190 ml       Physical Exam:     Physical Exam   Constitutional: She is oriented to person, place, and time  She appears well-developed  No distress  Frail, sitting upright in chair fairly comfortable in no acute distress   HENT:   Head: Normocephalic  Mouth/Throat: Oropharynx is clear and moist  No oropharyngeal exudate  Eyes: Conjunctivae and EOM are normal  Pupils are equal, round, and reactive to light  Right eye exhibits no discharge  Left eye exhibits no discharge  No scleral icterus     Neck: Normal range of motion  Neck supple  No JVD present  No tracheal deviation present  No thyromegaly present  Cardiovascular: Normal rate, regular rhythm and intact distal pulses  Exam reveals no gallop and no friction rub  Murmur heard  Systolic murmur is present with a grade of 2/6   DP pulses present bilaterally, no bilateral lower extremity edema   Pulmonary/Chest: Effort normal and breath sounds normal  No respiratory distress  She has no rales  She exhibits no tenderness  Abdominal: Bowel sounds are normal  She exhibits no mass  There is no rebound and no guarding  Musculoskeletal: She exhibits edema  She exhibits no tenderness or deformity  Neurological: She is alert and oriented to person, place, and time  She displays normal reflexes  No cranial nerve deficit  Coordination abnormal    Skin: Skin is warm and dry  No rash noted  She is not diaphoretic  No erythema  No pallor  Psychiatric: Her behavior is normal  Thought content normal        Additional Data:     Labs:      Results from last 7 days  Lab Units 12/13/17 0435  12/12/17  0919   WBC Thousand/uL 6 26  --  7 68   HEMOGLOBIN g/dL 11 4*  --  13 2   HEMATOCRIT % 34 6*  --  36 9   PLATELETS Thousands/uL 169  < > 210   NEUTROS PCT %  --   --  69   LYMPHS PCT %  --   --  23   MONOS PCT %  --   --  8   EOS PCT %  --   --  0   < > = values in this interval not displayed  Results from last 7 days  Lab Units 12/13/17  0435   SODIUM mmol/L 137   POTASSIUM mmol/L 3 7   CHLORIDE mmol/L 104   CO2 mmol/L 26   BUN mg/dL 15   CREATININE mg/dL 0 94   CALCIUM mg/dL 8 0*   TOTAL PROTEIN g/dL 5 8*   BILIRUBIN TOTAL mg/dL 0 60   ALK PHOS U/L 62   ALT U/L 21   AST U/L 21   GLUCOSE RANDOM mg/dL 86       Results from last 7 days  Lab Units 12/12/17  0919   INR  0 89       * I Have Reviewed All Lab Data Listed Above  * Additional Pertinent Lab Tests Reviewed:  All Labs For Current Hospital Admission Reviewed    Imaging:    Imaging Reports Reviewed Today Include: None  Imaging Personally Reviewed by Myself Includes:  None    Recent Cultures (last 7 days):           Last 24 Hours Medication List:     amiodarone 200 mg Oral Daily   aspirin 162 mg Oral Daily   docusate sodium 100 mg Oral BID   dorzolamide-timolol 1 drop Left Eye BID   famotidine 20 mg Oral BID   fish oil 1,000 mg Oral BID   gabapentin 100 mg Oral BID   lisinopril 5 mg Oral Daily   loteprednol etabonate 1 drop Both Eyes TID   magnesium oxide 400 mg Oral Daily   meclizine 12 5 mg Oral Q8H [address]   metoprolol succinate 25 mg Oral Daily   rOPINIRole 2 mg Oral Daily        Today, Patient Was Seen By: Adiel Aparicio PA-C    ** Please Note: Dragon 360 Dictation voice to text software may have been used in the creation of this document   **

## 2017-12-13 NOTE — PLAN OF CARE
Knowledge Deficit     Patient/family/caregiver demonstrates understanding of disease process, treatment plan, medications, and discharge instructions Progressing        MUSCULOSKELETAL - ADULT     Maintain or return mobility to safest level of function Progressing     Maintain proper alignment of affected body part Progressing        PAIN - ADULT     Verbalizes/displays adequate comfort level or baseline comfort level Progressing        Potential for Falls     Patient will remain free of falls Progressing        Prexisting or High Potential for Compromised Skin Integrity     Skin integrity is maintained or improved Progressing        SAFETY ADULT     Maintain or return to baseline ADL function Progressing     Maintain or return mobility status to optimal level Progressing

## 2017-12-13 NOTE — ASSESSMENT & PLAN NOTE
· Suspect orthostatic hypotension possibly atrial fibrillation with RVR not likely vasovagal given multiple falls and lightheadedness/dizziness is precipitated from positioning from seated to standing orthostatic BP positive in the ED  · Obtain orthostatic BP in the a m , telemetry, fecal occult for possible lower GI bleeding  · Decreased home medication-metoprolol succinate from 50 mg to 25 mg q day given bradycardia 50s in the ED  · PT/OT

## 2017-12-13 NOTE — ASSESSMENT & PLAN NOTE
· Secondary to orthostatic hypotension positive today not likely vasovagal given multiple falls and lightheadedness/dizziness is precipitated from positioning from seated to standing orthostatic BP positive in the ED  · Fecal occult pending  · Continue home medication-metoprolol succinate 25 mg q day  · PT/OT pending likely need home PT

## 2017-12-13 NOTE — ASSESSMENT & PLAN NOTE
· Admits to lower back pain significant history of spinal stenosis  · Follows outpatient neurosurgery Dr Елена Marrufo  · Continue home medication-Xanax 0 25 mg p r n  HS, Neurontin, Requip    Hold home medication-oxycodone

## 2017-12-13 NOTE — ASSESSMENT & PLAN NOTE
· Regular rate and rhythm EKG sinus bradycardia controlled HR  · Continue home medication-metoprolol

## 2017-12-13 NOTE — H&P
H&P- Shelton Davey 9/20/1926, 80 y o  female MRN: [id number]    Unit/Bed#: -01 Encounter: [id number]    Primary Care Provider: Ricky Mccray DO   Date and time admitted to hospital: 12/12/2017  8:25 AM        * Postural dizziness with near syncope   Assessment & Plan    · Suspect orthostatic hypotension possibly atrial fibrillation with RVR not likely vasovagal given multiple falls and lightheadedness/dizziness is precipitated from positioning from seated to standing orthostatic BP positive in the ED  · Obtain orthostatic BP in the a m , telemetry, fecal occult for possible lower GI bleeding  · Decreased home medication-metoprolol succinate from 50 mg to 25 mg q day given bradycardia 50s in the ED  · PT/OT          Chronic pain   Assessment & Plan    · Admits to lower back pain significant history of spinal stenosis  · Follows outpatient neurosurgery Dr Norman Hudson  · Continue home medication-Xanax 0 25 mg p r n  HS, Neurontin, Requip  Hold home medication-oxycodone        Right wrist sprain   Assessment & Plan    · Status post syncopal episode tender to touch right wrist/hand x-ray old chronic fracture with soft tissue swelling no acute bony abnormalities  · Continue supportive treatment elevate upper extremity with Ace wrap warm compresses  · Continue pain control-Tylenol        Hypertension   Assessment & Plan    · Stable BP  · Continue home medication-decrease metoprolol from 50 mg to 25 p o  q day, lisinopril        Paroxysmal atrial fibrillation (HCC)   Assessment & Plan    · Regular rate and rhythm EKG sinus bradycardia controlled HR  · Continue home medication-metoprolol              VTE Prophylaxis: Suspected lower GI bleeding  / sequential compression device   Code Status:  Full code-discussed with patient at bedside    POLST: POLST information provided but not completed    Anticipated Length of Stay:  Patient will be admitted on an Inpatient basis with an anticipated length of stay of  at least 2 midnights  Justification for Hospital Stay:  Postural dizziness with syncope    Total Time for Visit, including Counseling / Coordination of Care: 1 hour  Greater than 50% of this total time spent on direct patient counseling and coordination of care  Chief Complaint:   "I was become dizzy when I stand up to quickly "    History of Present Illness:    Adrián Crews is a 80 y o  female past medical history paroxysmal atrial fibrillation, chronic back pain, hypertension presents with lightheadedness/dizziness and syncopal episode X 1 day  Patient is primarily [place] speaking in the following sequence of events were obtained by patient daughter who was translated at the bedside  Patient and patient's daughter had admitted to frequent lightheadedness/dizziness episodes with occasional falls more commonly 1 patient attempts to get up from the seated position to a standing position quickly either to use the restroom or to complete daily living activities  Patient admitted this morning while attending to chores patient stood up from seated position with blurred vision and proceeded to ambulate then fell without cranial involvement landing on bed without loss of consciousness, loss of bowel or bladder, upper lower extremity unilateral weakness  Patient daughter assisted patient to her bed and this morning noticed right wrist hematoma with ecchymosis and brought patient to the emergency department for further evaluation  Patient daughter also admits to intermittent black tarry stools without bright red blood per rectum or abdominal pain occasional nausea  Patient patient admitted to chest tightness with nausea following presentation to the emergency department in denied fevers or chills, hematochezia, melena, hemoptysis or hematemesis  Patient received a colonoscopy roughly 2 years ago [facility] and an EGD over 5 years ago without significant result      In the ED, patient was found to be hemodynamically stable head CT was negative for acute intracranial abnormalities, chest x-ray was negative for acute pulmonary disease, right hip x-ray-negative for right hip fracture, a right wrist fracture soft tissue swelling with arthritic changes but no bony abnormalities, right hand chronic old fractures of distal radial metaphysis and ulnar styloid process with osteopenia and right shoulder with degenerative changes of AC joint and glenohumeral joint with previous rotator cuff tear  Patient was admitted to medical-surgical floor for further evaluation of postural dizziness with syncope  Review of Systems:    Review of Systems   Constitutional: Positive for activity change  Negative for appetite change, chills, diaphoresis, fatigue, fever and unexpected weight change  HENT: Negative for congestion, ear pain, facial swelling, hearing loss, postnasal drip, sinus pain, sinus pressure, sneezing, tinnitus, trouble swallowing and voice change  Eyes: Positive for visual disturbance  Negative for photophobia  Respiratory: Positive for chest tightness  Negative for apnea, cough, shortness of breath, wheezing and stridor  Cardiovascular: Negative for chest pain and leg swelling  Gastrointestinal: Positive for abdominal pain and nausea  Negative for abdominal distention, blood in stool, constipation, diarrhea and rectal pain  Genitourinary: Negative for difficulty urinating, dysuria, frequency, hematuria, menstrual problem and urgency  Musculoskeletal: Positive for back pain and gait problem  Negative for joint swelling and neck pain  Skin: Negative for color change and pallor  Neurological: Positive for syncope, weakness and light-headedness  Negative for tremors, seizures, facial asymmetry, speech difficulty, numbness and headaches         Past Medical and Surgical History:     Past Medical History:   Diagnosis Date    Chronic pain     back    Hypertension     Rotator cuff injury        Past Surgical History: Procedure Laterality Date    APPENDECTOMY      BACK SURGERY      EYE SURGERY         Meds/Allergies:    Prior to Admission medications    Medication Sig Start Date End Date Taking? Authorizing Provider   ALPRAZolam Fred Pates) 0 25 mg tablet Take 0 25 mg by mouth daily at bedtime 2/13/13  Yes Historical Provider, MD   amiodarone 200 mg tablet Take 200 mg by mouth daily 8/12/13  Yes Historical Provider, MD   aspirin 81 MG tablet Take 81 mg by mouth 2 (two) times a day 8/13/14  Yes Historical Provider, MD   dorzolamide-timolol (COSOPT) 22 3-6 8 MG/ML ophthalmic solution Administer 1 drop into the left eye 2 (two) times a day   Yes Historical Provider, MD   gabapentin (NEURONTIN) 100 mg capsule Take 100 mg by mouth 2 (two) times a day 6/12/14  Yes Historical Provider, MD   HYDROcodone-acetaminophen (NORCO) 5-325 mg per tablet Take by mouth 3 (three) times a day as needed 4/9/15  Yes Historical Provider, MD   lisinopril (ZESTRIL) 5 mg tablet Take 5 mg by mouth daily 2/13/13  Yes Historical Provider, MD   loteprednol etabonate (LOTEMAX) 0 5 % ophthalmic suspension Administer 1 drop to both eyes 3 (three) times a day   Yes Historical Provider, MD   Magnesium 400 MG CAPS Take 400 mg by mouth daily 9/10/14  Yes Historical Provider, MD   metoprolol succinate (TOPROL-XL) 50 mg 24 hr tablet Take 50 mg by mouth daily 2/13/13  Yes Historical Provider, MD   Omega-3 Fatty Acids (FISH OIL) 1,000 mg Take 1,000 mg by mouth 2 (two) times a day   Yes Historical Provider, MD   rOPINIRole (REQUIP XL) 2 MG 24 hr tablet Take 2 mg by mouth daily 4/27/16  Yes Historical Provider, MD   traZODone (DESYREL) 50 mg tablet Take 50 mg by mouth daily at bedtime 6/12/14  Yes Historical Provider, MD     I have reviewed home medications with patient family member  Allergies: Allergies   Allergen Reactions    Morphine GI Intolerance     "acts drunk"       Social History:     Marital Status:     Occupation:  Retired  Patient Pre-hospital Living Situation:  Lives with daughter at home  Patient Pre-hospital Level of Mobility:  Ambulates without cane occasionally using rolling walker  Patient Pre-hospital Diet Restrictions:  None  Substance Use History:   History   Alcohol Use No     History   Smoking Status    Never Smoker   Smokeless Tobacco    Never Used     History   Drug Use No       Family History:    Family History   Problem Relation Age of Onset    Heart attack Mother        Physical Exam:     Vitals:   Blood Pressure: 162/79 (12/12/17 1545)  Pulse: 59 (12/12/17 1545)  Temperature: 98 3 °F (36 8 °C) (12/12/17 1545)  Temp Source: Oral (12/12/17 1545)  Respirations: 16 (12/12/17 1545)  Height: 4' 11" (149 9 cm) (12/12/17 1545)  Weight - Scale: 68 8 kg (151 lb 10 8 oz) (12/12/17 1545)  SpO2: 96 % (12/12/17 1545)    Physical Exam   Constitutional: She is oriented to person, place, and time  She appears well-nourished  No distress  Frail sitting upright in bed fairly comfortable in no acute distress   HENT:   Head: Normocephalic and atraumatic  Left Ear: External ear normal    Mouth/Throat: Oropharynx is clear and moist    Eyes: Conjunctivae and EOM are normal  Pupils are equal, round, and reactive to light  Right eye exhibits no discharge  Left eye exhibits no discharge  No scleral icterus  Neck: Normal range of motion  Neck supple  JVD present  No tracheal deviation present  Thyromegaly present  Cardiovascular: Regular rhythm and intact distal pulses  Exam reveals no gallop and no friction rub  No murmur heard  DP pulses present bilaterally, no bilateral lower extremity edema   Pulmonary/Chest: Effort normal and breath sounds normal  No respiratory distress  She has no wheezes  She has no rales  She exhibits no tenderness  Abdominal: Soft  Bowel sounds are normal  She exhibits no distension and no mass  There is tenderness  There is no rebound and no guarding  Musculoskeletal: Normal range of motion  She exhibits edema   She exhibits no tenderness or deformity  Lymphadenopathy:     She has no cervical adenopathy  Neurological: She is alert and oriented to person, place, and time  Coordination abnormal    Skin: Skin is warm  No rash noted  She is not diaphoretic  There is erythema  No pallor  Psychiatric: Her behavior is normal  Thought content normal        Additional Data:     Lab Results: I have personally reviewed pertinent reports  Results from last 7 days  Lab Units 12/12/17  1753 12/12/17  0919   WBC Thousand/uL  --  7 68   HEMOGLOBIN g/dL  --  13 2   HEMATOCRIT %  --  36 9   PLATELETS Thousands/uL 195 210   NEUTROS PCT %  --  69   LYMPHS PCT %  --  23   MONOS PCT %  --  8   EOS PCT %  --  0       Results from last 7 days  Lab Units 12/12/17  0919   SODIUM mmol/L 133*   POTASSIUM mmol/L 4 4   CHLORIDE mmol/L 99*   CO2 mmol/L 26   BUN mg/dL 20   CREATININE mg/dL 0 97   CALCIUM mg/dL 8 3   TOTAL PROTEIN g/dL 7 0   BILIRUBIN TOTAL mg/dL 0 60   ALK PHOS U/L 77   ALT U/L 29   AST U/L 30   GLUCOSE RANDOM mg/dL 107       Results from last 7 days  Lab Units 12/12/17  0919   INR  0 89       Imaging: I have personally reviewed pertinent films in PACS    Xr Chest 2 Views    Result Date: 12/12/2017  Narrative: CHEST INDICATION: Nausea COMPARISON: January 5, 2015 VIEWS:  AP semierect and lateral IMAGES:  2 FINDINGS: Submaximal inspiration  The cardiomediastinal silhouette is unremarkable  The lungs are clear  No pleural effusions  Mild degenerative changes, thoracic spine  Compression fracture of a lower thoracic vertebral segment, unchanged in height  Impression: No active pulmonary disease    Workstation performed: [id number]     VIVIANE Shoulder 2+ Views Right    Result Date: 12/12/2017  Narrative: RIGHT SHOULDER INDICATION:  Right shoulder pain  COMPARISON: None VIEWS:  AP, Grashey and transscapular lateral IMAGES:  3 FINDINGS: There is no acute fracture or dislocation   Narrowing of the glenohumeral joint space is seen with adjacent marginal osteophyte formation present  Narrowing of the acromiohumeral joint space may indicate rotator cuff tear and/or degeneration  Spurs are seen at the inferior margins of the acromioclavicular joint  No lytic or blastic lesions are seen  Soft tissues are unremarkable  Impression: Degenerative changes, acromio clavicular joint and glenohumeral joint  Narrowing of the acromiohumeral joint space may indicate rotator cuff tear and/or degeneration  Workstation performed: [id number]     Xr Wrist 3+ Views Right    Result Date: 12/12/2017  Narrative: RIGHT WRIST INDICATION:   Right wrist pain  Fall  COMPARISON: None VIEWS:  PA, lateral, and oblique IMAGES:  3 FINDINGS: There is no acute fracture or dislocation  Old healed fracture of the distal radial metaphysis is noted  Old ununited fracture of the ulnar styloid process is seen  Narrowing of the radio carpal joint spaces is seen  The bones are mildly and diffusely demineralized  No focal osteolytic or osteoblastic lesions are seen  Soft tissue swelling, thenar eminence  Impression: No acute osseous abnormality  Soft tissue swelling of the thumb  Arthritic changes, radiocarpal joint spaces  Osteopenia  Workstation performed: [id number]     Xr Hand 3+ Views Right    Result Date: 12/12/2017  Narrative: RIGHT HAND INDICATION:  Right hand pain  COMPARISON: None VIEWS:  PA, lateral and oblique IMAGES:  3 For the purposes of institution wide universal language the following terms will apply: (thumb=1st digit/finger, index finger=2nd digit/finger, long finger=3rd digit/finger, ring=4th digit/finger and small finger=5th digit/finger) FINDINGS: There is no acute fracture or dislocation  Old healed fracture of the distal radial metaphysis old ununited fracture of the ulnar styloid process  Considerable narrowing of the radiocarpal joint spaces  The bones are diffusely demineralized  No focal osteolytic or osteoblastic lesions   Soft tissues are unremarkable  Impression: No acute fracture  Old fractures of the distal radial metaphysis and ulnar styloid process  Osteopenia  Workstation performed: [id number]     Xr Hip/pelv 2-3 Vws Right    Result Date: 12/12/2017  Narrative: RIGHT HIP INDICATION:  Right hip pain  Fall  COMPARISON: None VIEWS: AP pelvis and 2 coned down views of the hip IMAGES:  3 FINDINGS: There is no acute fracture or dislocation  Posterior metallic orthopedic appliances overlie the midline of the L3-4 and L4-5 levels  Bony spur traverses the superior margin of the symphysis pubis  No lytic or blastic lesions are seen  Soft tissues are unremarkable  Impression: Negative for right hip fracture  Workstation performed: [id number]     Ct Head Without Contrast    Result Date: 12/12/2017  Narrative: CT BRAIN - WITHOUT CONTRAST INDICATION:  Vertigo COMPARISON:  12/25/2016 TECHNIQUE:  CT examination of the brain was performed  In addition to axial images, coronal reformatted images were created and submitted for interpretation  Radiation dose length product (DLP) for this visit:  1244 mGy-cm   This examination, like all CT scans performed in the [facility], was performed utilizing techniques to minimize radiation dose exposure, including the use of iterative reconstruction and automated exposure control  IMAGE QUALITY:  Diagnostic  FINDINGS:  PARENCHYMA:  Decreased attenuation is noted in the supratentorial white matter demonstrating an appearance most consistent with moderate microangiopathic change  No intracranial mass, mass effect or midline shift  No CT signs of acute infarction  There is no parenchymal hemorrhage  VENTRICLES AND EXTRA-AXIAL SPACES:  Age-appropriate volume loss is noted  No hydrocephalus  VISUALIZED ORBITS AND PARANASAL SINUSES:  Unremarkable  CALVARIUM AND EXTRACRANIAL SOFT TISSUES:   Normal      Impression: No acute intracranial abnormality  Microangiopathic changes   Workstation performed: [id number]       EKG, Pathology, and Other Studies Reviewed on Admission:   · EKG:  Sinus bradycardia with first-degree AV block    Allscripts / Epic Records Reviewed: Yes     ** Please Note: This note has been constructed using a voice recognition system   **

## 2017-12-13 NOTE — ASSESSMENT & PLAN NOTE
· Admits to lower back pain significant history of spinal stenosis  · Follows outpatient neurosurgery Dr Ramiro Mclean  · Continue home medication-Xanax 0 25 mg p r n  HS, Neurontin, Requip    Hold home medication-oxycodone

## 2017-12-13 NOTE — PROGRESS NOTES
Pt appears to be resting comfortably, no signs of distress displayed  Pt HR is 51 with a 1st degree heart block on telemetry  Will continue to monitor

## 2017-12-13 NOTE — CASE MANAGEMENT
Initial Clinical Review    Admission: Date/Time/Statement: 12/12/17 @ 1209     Orders Placed This Encounter   Procedures    Inpatient Admission (expected length of stay for this patient is greater than two midnights)     Standing Status:   Standing     Number of Occurrences:   1     Order Specific Question:   Admitting Physician     Answer:   Deliohillary Jared [[id number]]     Order Specific Question:   Level of Care     Answer:   Med Surg [[id number]]     Order Specific Question:   Estimated length of stay     Answer:   More than 2 Midnights     Order Specific Question:   Certification     Answer:   I certify that inpatient services are medically necessary for this patient for a duration of greater than two midnights  See H&P and MD Progress Notes for additional information about the patient's course of treatment  ED: Date/Time/Mode of Arrival:   ED Arrival Information     Expected Arrival Acuity Means of Arrival Escorted By Service Admission Type    - 12/12/2017 08:15 Urgent Walk-In Family Member General Medicine Urgent    Arrival Complaint    fall/arm injury          Chief Complaint:   Chief Complaint   Patient presents with   [employer] Fall     Per daughter pt got dizzy yesterday and fell on her right arm  Pt has been having frequent dizzy spells  History of Illness: 28-year-old female presents to the emergency department after a fall  States that she fell in the bathroom last night and landed on her right side injuring her wrist and shoulder  Also complains of some mild pain in the right hip  Patient's daughter is at bedside and states that the patient has been getting dizzy spells intermittently for a long time but that they seem to have gotten more frequent  States that she heard her fall yesterday and went to the bathroom to help her out  Noticed today that her hand and wrist were bruised and swollen  Patient is also complaining of increased pain in the right shoulder  She denies head injury   She has not had any fevers at home or other signs of infection  States that she has had some nausea earlier today but denies chest pain or difficulty breathing  ED Vital Signs:   ED Triage Vitals   Temperature Pulse Respirations Blood Pressure SpO2   12/12/17 0842 12/12/17 0831 12/12/17 0831 12/12/17 0831 12/12/17 0831   98 2 °F (36 8 °C) 60 14 (!) 213/94 95 %      Temp Source Heart Rate Source Patient Position - Orthostatic VS BP Location FiO2 (%)   12/12/17 0842 12/12/17 0831 12/12/17 0831 12/12/17 0831 --   Oral Monitor Lying Left arm       Pain Score       12/12/17 0831       8        Wt Readings from Last 1 Encounters:   12/13/17 63 5 kg (139 lb 15 9 oz)       Vital Signs (abnormal):   Date and Time Temp Pulse SpO2 Resp BP   12/12/17 1545 98 3 °F (36 8 °C) 59 96 % 16 162/79   12/12/17 1515 --  54 95 % 16 144/65   12/12/17 1302 --  54 95 % 16 165/73   12/12/17 1120 -- 56 95 % 16  152/102       Abnormal Labs/Diagnostic Test Results: Na 133, Cl 99    CT of Head: No acute intracranial abnormality   Microangiopathic changes  Right Wrist Xray: No acute osseous abnormality  Soft tissue swelling of the thumb  Arthritic changes, radiocarpal joint spaces  Osteopenia    Right Hand Xray: No acute fracture  Old fractures of the distal radial metaphysis and ulnar styloid process  Osteopenia  Right Shoulder Xray: Degenerative changes, acromio clavicular joint and glenohumeral joint    Narrowing of the acromiohumeral joint space may indicate rotator cuff tear and/or degeneration     EKG: Sinus bradycardia with 1st degree A-V block, Left axis deviation    ED Treatment:   Medication Administration from 12/12/2017 0815 to 12/12/2017 1545       Date/Time Order Dose Route Action Action by Comments     12/12/2017 1500 sodium chloride 0 9 % bolus 1,000 mL 0 mL Intravenous Stopped Radha Burks RN      12/12/2017 7748 sodium chloride 0 9 % bolus 1,000 mL 1,000 mL Intravenous Lawson Salgado RN Physical Exam   Constitutional: She is oriented to person, place, and time  She appears well-developed and well-nourished  No distress  Cardiovascular: Normal rate, regular rhythm and normal heart sounds  Exam reveals no gallop and no friction rub  No murmur heard  Pulmonary/Chest: Effort normal and breath sounds normal  No respiratory distress  She has no wheezes  She has no rales  She exhibits no tenderness  Musculoskeletal: She exhibits no edema or deformity  Right shoulder: She exhibits decreased range of motion, tenderness, bony tenderness and swelling  She exhibits no effusion, no crepitus, no deformity, no laceration, no pain, no spasm, normal pulse and normal strength  Right wrist: She exhibits decreased range of motion (echymosis), tenderness and bony tenderness  She exhibits no swelling, no effusion, no crepitus, no deformity and no laceration  Past Medical/Surgical History: Active Ambulatory Problems     Diagnosis Date Noted    No Active Ambulatory Problems     Resolved Ambulatory Problems     Diagnosis Date Noted    No Resolved Ambulatory Problems     Past Medical History:   Diagnosis Date    Chronic pain     Hypertension     Rotator cuff injury        Admitting Diagnosis: Dizziness [R42]  Arm injury [S49 90XA]  Frequent falls [R29 6]  Ambulatory dysfunction [R26 2]    Age/Sex: 80 y o  female    Assessment/Plan:     * Postural dizziness with near syncope   Assessment & Plan     · Suspect orthostatic hypotension possibly atrial fibrillation with RVR not likely vasovagal given multiple falls and lightheadedness/dizziness is precipitated from positioning from seated to standing orthostatic BP positive in the ED  ? Obtain orthostatic BP in the a m , telemetry, fecal occult for possible lower GI bleeding  ?  Decreased home medication-metoprolol succinate from 50 mg to 25 mg q day given bradycardia 50s in the ED  ? PT/OT          Chronic pain   Assessment & Plan     · Admits to lower back pain significant history of spinal stenosis  ? Follows outpatient neurosurgery Dr Elen Hopkins  ? Continue home medication-Xanax 0 25 mg p r n  HS, Neurontin, Requip  Hold home medication-oxycodone       Right wrist sprain   Assessment & Plan     · Status post syncopal episode tender to touch right wrist/hand x-ray old chronic fracture with soft tissue swelling no acute bony abnormalities  ? Continue supportive treatment elevate upper extremity with Ace wrap warm compresses  ? Continue pain control-Tylenol       Hypertension   Assessment & Plan     · Stable BP  ? Continue home medication-decrease metoprolol from 50 mg to 25 p o  q day, lisinopril       Paroxysmal atrial fibrillation (HCC)   Assessment & Plan     · Regular rate and rhythm EKG sinus bradycardia controlled HR  ? Continue home medication-metoprolol                VTE Prophylaxis: Suspected lower GI bleeding  / sequential compression device   Code Status:  Full code-discussed with patient at bedside  POLST: POLST information provided but not completed     Anticipated Length of Stay:  Patient will be admitted on an Inpatient basis with an anticipated length of stay of  at least 2 midnights     Justification for Hospital Stay:  Postural dizziness with syncope       Admission Orders:  Inpatient/Tele  Continuous Cardiac Monitoring  Serial Cardiac Enzymes q6h x 3  Bilateral Sequential Compression Device  OT/PT Consult  Echocardiogram  Orthostatic Vital Signs    Scheduled Meds:   amiodarone 200 mg Oral Daily   aspirin 162 mg Oral Daily   docusate sodium 100 mg Oral BID   dorzolamide-timolol 1 drop Left Eye BID   famotidine 20 mg Oral BID   fish oil 1,000 mg Oral BID   gabapentin 100 mg Oral BID   lisinopril 5 mg Oral Daily   loteprednol etabonate 1 drop Both Eyes TID   magnesium oxide 400 mg Oral Daily   meclizine 12 5 mg Oral Q8H [address]   metoprolol succinate 25 mg Oral Daily   rOPINIRole 2 mg Oral Daily

## 2017-12-13 NOTE — ASSESSMENT & PLAN NOTE
· Status post syncopal episode tender to touch right wrist/hand x-ray old chronic fracture with soft tissue swelling no acute bony abnormalities  · Continue supportive treatment elevate upper extremity with Ace wrap warm compresses    · Continue pain control-Tylenol

## 2017-12-14 VITALS
RESPIRATION RATE: 18 BRPM | DIASTOLIC BLOOD PRESSURE: 76 MMHG | HEART RATE: 57 BPM | TEMPERATURE: 98 F | HEIGHT: 59 IN | OXYGEN SATURATION: 96 % | SYSTOLIC BLOOD PRESSURE: 134 MMHG | WEIGHT: 140.87 LBS | BODY MASS INDEX: 28.4 KG/M2

## 2017-12-14 PROCEDURE — G8987 SELF CARE CURRENT STATUS: HCPCS

## 2017-12-14 PROCEDURE — G8978 MOBILITY CURRENT STATUS: HCPCS

## 2017-12-14 PROCEDURE — G8979 MOBILITY GOAL STATUS: HCPCS

## 2017-12-14 PROCEDURE — G8989 SELF CARE D/C STATUS: HCPCS

## 2017-12-14 PROCEDURE — G8988 SELF CARE GOAL STATUS: HCPCS

## 2017-12-14 PROCEDURE — 97163 PT EVAL HIGH COMPLEX 45 MIN: CPT

## 2017-12-14 PROCEDURE — 97167 OT EVAL HIGH COMPLEX 60 MIN: CPT

## 2017-12-14 RX ORDER — MECLIZINE HCL 12.5 MG/1
12.5 TABLET ORAL EVERY 8 HOURS PRN
Qty: 90 TABLET | Refills: 0 | Status: SHIPPED | OUTPATIENT
Start: 2017-12-14 | End: 2018-03-01 | Stop reason: SDUPTHER

## 2017-12-14 RX ORDER — FAMOTIDINE 20 MG/1
20 TABLET, FILM COATED ORAL 2 TIMES DAILY
Qty: 60 TABLET | Refills: 0 | Status: SHIPPED | OUTPATIENT
Start: 2017-12-14 | End: 2018-04-18

## 2017-12-14 RX ORDER — METOPROLOL SUCCINATE 25 MG/1
25 TABLET, EXTENDED RELEASE ORAL DAILY
Qty: 30 TABLET | Refills: 0 | Status: SHIPPED | OUTPATIENT
Start: 2017-12-14 | End: 2018-01-13

## 2017-12-14 RX ADMIN — DOCUSATE SODIUM 100 MG: 100 CAPSULE, LIQUID FILLED ORAL at 08:50

## 2017-12-14 RX ADMIN — ASPIRIN 81 MG 162 MG: 81 TABLET ORAL at 08:50

## 2017-12-14 RX ADMIN — MECLIZINE 12.5 MG: 12.5 TABLET ORAL at 05:29

## 2017-12-14 RX ADMIN — AMIODARONE HYDROCHLORIDE 200 MG: 200 TABLET ORAL at 08:50

## 2017-12-14 RX ADMIN — Medication 1000 MG: at 08:50

## 2017-12-14 RX ADMIN — METOPROLOL SUCCINATE 25 MG: 25 TABLET, EXTENDED RELEASE ORAL at 08:50

## 2017-12-14 RX ADMIN — DORZOLAMIDE HYDROCHLORIDE AND TIMOLOL MALEATE 1 DROP: 20; 5 SOLUTION/ DROPS OPHTHALMIC at 08:51

## 2017-12-14 RX ADMIN — Medication 400 MG: at 08:50

## 2017-12-14 RX ADMIN — GABAPENTIN 100 MG: 100 CAPSULE ORAL at 08:50

## 2017-12-14 RX ADMIN — LISINOPRIL 5 MG: 5 TABLET ORAL at 08:50

## 2017-12-14 RX ADMIN — MECLIZINE 12.5 MG: 12.5 TABLET ORAL at 13:13

## 2017-12-14 RX ADMIN — FAMOTIDINE 20 MG: 20 TABLET, FILM COATED ORAL at 08:50

## 2017-12-14 NOTE — ASSESSMENT & PLAN NOTE
· Assessment: Stable on exam today, has full functioning of hand    · Plan: Continue supportive care with Tylenol at home

## 2017-12-14 NOTE — SOCIAL WORK
TC to dtr and explained about the bundle form left in room  Dtr is caregiver for pt and will be taking pt home today

## 2017-12-14 NOTE — OCCUPATIONAL THERAPY NOTE
[address]  Evaluation     Patient Name: Akhil Dominguez  VPAMD'N Date: 12/14/2017  Problem List  Patient Active Problem List   Diagnosis    Hypertension    Chronic pain    Paroxysmal atrial fibrillation (HCC)    Postural dizziness with near syncope    Right wrist sprain     Past Medical History  Past Medical History:   Diagnosis Date    Chronic pain     back    Hypertension     Rotator cuff injury      Past Surgical History  Past Surgical History:   Procedure Laterality Date    APPENDECTOMY      BACK SURGERY      EYE SURGERY        12/14/17 1025   Note Type   Note type Eval only   Restrictions/Precautions   Weight Bearing Precautions Per Order No   Other Precautions Chair Alarm; Bed Alarm;Cognitive; Fall Risk;Pain   Pain Assessment   Pain Assessment FLACC   Pain Score 2   Pain Location Abdomen   Pain Orientation Mid   Patient's Stated Pain Goal No pain   Pain Rating: FLACC (Rest) - Face 1   Pain Rating: FLACC (Rest) - Legs 0   Pain Rating: FLACC (Rest) - Activity 0   Pain Rating: FLACC (Rest) - Cry 0   Pain Rating: FLACC (Rest) - Consolability 0   Score: FLACC (Rest) 1   Pain Rating: FLACC (Activity) - Face 1   Pain Rating: FLACC (Activity) - Legs 0   Pain Rating: FLACC (Activity) - Activity 0   Pain Rating: FLACC (Activity) - Cry 1   Pain Rating: FLACC (Activity) - Consolability 0   Score: FLACC (Activity) 2   Home Living   Type of Home House   Home Layout One level  (0 MASSIEL)   Bathroom Shower/Tub Walk-in shower  (and tub/ shower)   Bathroom Toilet Standard   Bathroom Equipment Shower chair   [address] Walker  (rollator)   Additional Comments Pt reports living w/ her daughter in [facility] w/ 0 MASSIEL  Pt reports using rollator  Prior Function   Level of [place] Needs assistance with IADLs; Needs assistance with ADLs and functional mobility   Lives With Daughter   Receives Help From Family   ADL Assistance Needs assistance   IADLs Needs assistance   Falls in the last 6 months 1 to 4  (pt reports 1)   Comments Pt reports assistance from family w/ ADL/ IADL PTA   Lifestyle   Autonomy Pt reports that her family assist w/ her ADL/IADL PTA  Pt primarily [place] speaking, and benefits from use of [employer] phone  #[phone]   Reciprocal Relationships Pt reports supportive and involved family that provde 25 hour S   Intrinsic Gratification Pt unable to report leisure interests upon evaluation using [employer]    ADL   Grooming Assistance 5  Supervision/Setup   Grooming Deficit Standing with assistive device;Verbal cueing; Increased time to complete  (visual / physical cues for walker mgmt)   UB Bathing Assistance Unable to assess   LB Bathing Assistance Unable to assess   UB Dressing Assistance 5  Supervision/Setup   UB Dressing Deficit Setup; Increased time to complete   LB Dressing Assistance 5  Supervision/Setup   LB Dressing Deficit Setup; Increased time to complete;Verbal cueing; Other (Comment)  (visual / physical cues)   Toileting Assistance  4  Minimal Assistance   Toileting Deficit Clothing management up;Clothing management down;Supervison/safety  (visual / physical cues)   Additional Comments Pt reports "eyes go black" and symptoms w/ positional changes   Bed Mobility   Supine to Sit 5  Supervision   Additional items Assist x 1;HOB elevated   Sit to Supine Unable to assess   Additional Comments Pt seated OOB in chair post eval w/ call bell in reach and chair alarm activated   Transfers   Sit to Stand 5  Supervision   Additional items Assist x 1;Verbal cues  (visual and tactile cues)   Stand to Sit 5  Supervision   Additional items Assist x 1; Increased time required;Verbal cues  (tactile and visual cues)   Functional Mobility   Functional Mobility 5  Supervision   Additional Comments household distances   Additional items Rolling walker   Balance   Static Sitting Good   Dynamic Sitting Fair -   Static Standing Fair -   Ambulatory Fair -   Activity Tolerance Activity Tolerance Other (Comment)  ("dizziness")   Medical Staff Made [place] [facility] [place]   Nurse Made Aware spoke to [place]   RUE Assessment   RUE Assessment X  (limited AROM shoulder)   RUE Strength   RUE Overall Strength Deficits; Other (Comment)  (NT at shoulder, grasp 3/5)   LUE Assessment   LUE Assessment WFL   LUE Strength   LUE Overall Strength Within Functional Limits - able to perform ADL tasks with strength   Hand Function   Fine Motor Coordination Functional   Sensation   Light Touch No apparent deficits   Sharp/Dull Not tested   Vision-Basic Assessment   Current Vision Wears glasses only for reading   Cognition   Overall Cognitive Status Impaired   Arousal/Participation Alert; Cooperative   Attention Attends with cues to redirect   Orientation Level Oriented to person;Oriented to place;Oriented to situation;Disoriented to time   Memory Decreased recall of recent events;Decreased short term memory;Decreased recall of biographical information   Following Commands Follows one step commands with increased time or repetition  (gestures and physical demonstration)   Comments Pt primarily [place] speaking  Pt demonstrated decreased recall of bio info thru use of [employer] phone   Assessment   Assessment Pt is a 81 yo female [facility] on 12/12/2017  Pt presents w/ postural dizziness with near sycope, and significant PMH impacting her occupational performance including a-fib, chronic back pain, HTN, hx R distal radius / ulnar styloid process fracture, degernative changes right shoulder, hx R rotator cuff tear  Pt primarily [place] speaking, and benefits from use of [employer] phone  Pt demonstrated decreased recall of bio info, and oriented to person, situation, and place (hospital)  Pt demosntrated B UE AROM WFL except R shoulder  Pt completed bed mobility w/ S supine to sit at EOB w/ HOB elevated  Pt completed LBD w/ S  Pt completed toileting w/ min A for clothing mgmt up/ down   Pt completed functional mobility hosuehold distances using RW w/ CS  From an OT perspective, pt can return [facility] to [facility] w/ continued 24 hour S / assist from family when medically stable for discharge from acute care  Pt would benefit from out OT to address /evaluate "dizziness)  No additional acute care OT needs at this time  Recommend continued active particpation in ADL's w/ nursing staff while in acute care  D/C OT    Goals   Patient Goals to feel better   Recommendation   OT Discharge Recommendation (return to PLOF w/ 24hr S/A and outpt OT)   Equipment Recommended Bedside commode   OT - OK to Discharge (return to PLOF w/ 24 hr S / assist when medically stable)   Barthel Index   Feeding 10   Bathing 0   Grooming Score 5   Dressing Score 5   Bladder Score 10   Bowels Score 10   Toilet Use Score 5   Transfers (Bed/Chair) Score 10   Mobility (Level Surface) Score 10   Stairs Score 0   Barthel Index Score 65   No acute care OT needs at this time   D/C OT  Dario Strong, OT

## 2017-12-14 NOTE — PLAN OF CARE
Problem: PHYSICAL THERAPY ADULT  Goal: Performs mobility at highest level of function for planned discharge setting  See evaluation for individualized goals  Treatment/Interventions: Functional transfer training, LE strengthening/ROM, Therapeutic exercise, Endurance training, Cognitive reorientation, Patient/family training, Equipment eval/education, Gait training, Bed mobility, Spoke to nursing, OT  Equipment Recommended: Catie Perera       See flowsheet documentation for full assessment, interventions and recommendations  Prognosis: Good  Problem List: Impaired balance, Decreased coordination, Decreased cognition, Decreased safety awareness, Impaired judgement, Pain ("dizziness" and eyes get dark)  Assessment: Pt is a 80 y o  female seen for PT evaluation s/p admit to [address] [place] [address] on 12/12/2017 w/ Postural dizziness with near syncope  Order placed for PT  Prior to admission, pt was lives in one floor environment, lives with daughter(s), used rolling walker for mobility and reportedly needed A for mobility and ADLs  Upon evaluation: Pt requires S for transfers, bed mobility and ambulation with rolling walker  The following objective measures were performed on IE: Barthel Index 65/100  Pt's clinical presentation is currently unstable/unpredictable given the functional mobility deficits above, especially impaired balance, decreased endurance, impaired coordination, gait deviations, pain, fall risk, decreased cognition and "dizziness" win which pt describes as "eyes get dark", combined with medical complications of + orthostatic BPs and elevated BPs (209/89 lying to 179/80 standing yesterday), lwo H&H and low Na on 12/12/2017  Pt to benefit from continued skilled PT tx while in hospital and upon DC to address deficits as defined above and maximize level of functional mobility   From PT/mobility standpoint, recommendation at time of d/c would be home with family support and home PT vs OPPT pending progress in order to maximize pt's functional independence and consistency w/ mobility in order to facilitate return to PLOF  Recommend trial with walker next session and higher level balance activities  Recommendation: Home with family support (home vs OPPT (potentially @ neuro OP program))          See flowsheet documentation for full assessment

## 2017-12-14 NOTE — PLAN OF CARE
Problem: DISCHARGE PLANNING - CARE MANAGEMENT  Goal: Discharge to post-acute care or home with appropriate resources  INTERVENTIONS:  - Conduct assessment to determine patient/family and health care team treatment goals, and need for post-acute services based on payer coverage, community resources, and patient preferences, and barriers to discharge  - Address psychosocial, clinical, and financial barriers to discharge as identified in assessment in conjunction with the patient/family and health care team  - Arrange appropriate level of post-acute services according to patients   needs and preference and payer coverage in collaboration with the physician and health care team  - Communicate with and update the patient/family, physician, and health care team regarding progress on the discharge plan  - Arrange appropriate transportation to post-acute venues  Outcome: Adequate for Discharge  TC to dtr and explained about the bundle form left in room  Dtr is caregiver for pt and will be taking pt home today

## 2017-12-14 NOTE — ASSESSMENT & PLAN NOTE
· Assessment: Stable at this time, follows Dr Morales Sanchez  ·    · Plan: Continue Toprol-XL Daily   Follow up as directed

## 2017-12-14 NOTE — ASSESSMENT & PLAN NOTE
· Assessment: Patient still with dizziness today, has been ongoing for the last few years  Orthostatic hypotension likely adding to problem as well as her chronic vision and hearing problems   PT recommended home with home PT     · Plan: Discharge home today with Meclizine PRN

## 2017-12-14 NOTE — DISCHARGE SUMMARY
Discharge- Jered Moeller 9/20/1926, 80 y o  female MRN: [id number]    Unit/Bed#: -01 Encounter: [id number]    Primary Care Provider: Erlin Garg DO   Date and time admitted to hospital: 12/12/2017  8:25 AM        * Postural dizziness with near syncope   Assessment & Plan    · Assessment: Patient still with dizziness today, has been ongoing for the last few years  Orthostatic hypotension likely adding to problem as well as her chronic vision and hearing problems  PT recommended home with home PT     · Plan: Discharge home today with Meclizine PRN          Paroxysmal atrial fibrillation (HCC)   Assessment & Plan    · Assessment: Stable at this time, follows Dr Shivani Gonzalez  ·    · Plan: Continue Toprol-XL Daily  Follow up as directed        Right wrist sprain   Assessment & Plan    · Assessment: Stable on exam today, has full functioning of hand    · Plan: Continue supportive care with Tylenol at home        Hypertension   Assessment & Plan    · Stable BP  · Continue home medication-decrease metoprolol 25 mg p o  q day, lisinopril        Chronic pain   Assessment & Plan    · Admits to lower back pain significant history of spinal stenosis  · Follows outpatient neurosurgery Dr Nahomi Kim  · Continue home medication-Xanax 0 25 mg p r n  HS, Neurontin, Requip  Hold home medication-oxycodone                Consultations During Hospital Stay:  · None    Procedures Performed:     · CXR PA and Lateral   · XR hip/pelvis 2-3 views right  · XR wrist right  · XR hand right   · XR shoulder right  · CT Head without contrast    Significant Findings / Test Results:     · CXR PA and Lateral - no active pulmonary disease  · XR hip/pelvis 2-3 views right - Negative for right hip fracture  · XR Wrist Right - No acute osseous abnormality  Soft tissue swelling of the thumb  Arthritic changes, radiocarpal joint spaces  Osteopenia   · XR Hand right -  No acute fracture   Old fractures of the distal radial metaphysis and ulnar styloid process  Osteopenia   · XR shoulder right - Degenerative changes, acromioclavilcular joint and glenohumeral joint  Narrowing of the acromiohumeral joint space may indicate rotator cuff tear and or degeneration  · CT head without contrast - No acute intracranial abnormality  Microangiopathic changes    Incidental Findings:   · AS above      Test Results Pending at Discharge (will require follow up): · None     Outpatient Tests Requested:  · None    Complications:  None    Reason for Admission: Dizziness    Hospital Course:     Sima Silva is a 80 y o  female patient who originally presented to the hospital on 12/12/2017 due to dizziness  The patient was given 2 liters of IV fluids in the emergency department and was admitted for further evaluation  Her orthostatic blood pressures were positive so her Metoprolol was decreased and her BP and heart rate remained stable  It was later found that her dizziness has been a chronic problem and she has had a long history of hearing and vision problems which likely compounded this issue  Her hgb was noted to decrease overnight, however this was likely due to fluid administration given all of her cell lines had decreased and the dark stools the patient had been complaining of were not new, her daughter reports this has been a "problem" for a few years in this patient and she had a prior normal colonoscopy roughly 2 years ago  Case was discussed with the patient's daughter who was accepting to have the patient return home  VNA was set up, however patient will likely refuse this in time  Please see above list of diagnoses and related plan for additional information  Condition at Discharge: stable     Discharge Day Visit / Exam:     Subjective:  Patient reports still being dizzy  States that she has dark stools  Denies numbness, tingling, weakness  Denies BRBPR    Vitals: Blood Pressure: 134/76 (12/14/17 0700)  Pulse: 57 (12/14/17 0700)  Temperature: 98 °F (36 7 °C) (12/14/17 0700)  Temp Source: Oral (12/13/17 2152)  Respirations: 18 (12/14/17 0700)  Height: 4' 11" (149 9 cm) (12/12/17 1545)  Weight - Scale: 63 9 kg (140 lb 14 oz) (12/14/17 0540)  SpO2: 96 % (12/14/17 0700)  Exam:   Physical Exam   Constitutional: She is oriented to person, place, and time  Vital signs are normal  She appears well-developed and well-nourished  Non-toxic appearance  No distress  HENT:   Head: Normocephalic and atraumatic  Eyes: Conjunctivae and EOM are normal  Pupils are equal, round, and reactive to light  Neck: Neck supple  Cardiovascular: Normal rate, regular rhythm, S1 normal, S2 normal, normal heart sounds and intact distal pulses  Exam reveals no S3 and no S4  No murmur heard  Pulmonary/Chest: Effort normal and breath sounds normal  No accessory muscle usage  No respiratory distress  She has no decreased breath sounds  She has no wheezes  She has no rhonchi  She has no rales  She exhibits no tenderness  Abdominal: Soft  Bowel sounds are normal  She exhibits no distension and no mass  There is no tenderness  There is no rigidity, no rebound and no guarding  Neurological: She is alert and oriented to person, place, and time  She displays no tremor  No cranial nerve deficit or sensory deficit  Skin: Skin is warm and dry  Discussion with Family: Discussed with daughter over phone    Discharge instructions/Information to patient and family:   See after visit summary for information provided to patient and family  Provisions for Follow-Up Care:  See after visit summary for information related to follow-up care and any pertinent home health orders  Disposition:     Home with VNA Services (Reminder: Complete face to face encounter)    For Discharges to [facility] SNF:   · Not Applicable to this Patient - Not Applicable to this Patient    Planned Readmission: None     Discharge Statement:  I spent 45 minutes discharging the patient   This time was spent on the day of discharge  I had direct contact with the patient on the day of discharge  Greater than 50% of the total time was spent examining patient, answering all patient questions, arranging and discussing plan of care with patient as well as directly providing post-discharge instructions  Additional time then spent on discharge activities  Discharge Medications:  See after visit summary for reconciled discharge medications provided to patient and family        ** Please Note: This note has been constructed using a voice recognition system **

## 2017-12-14 NOTE — PHYSICAL THERAPY NOTE
PHYSICAL THERAPY EVALUATION  NAME:  Philip Young  DATE: 12/14/17    AGE:   80 y o  Mrn:   [id number]  ADMIT DX:  Dizziness [R42]  Arm injury [S49 90XA]  Frequent falls [R29 6]  Ambulatory dysfunction [R26 2]    Past Medical History:   Diagnosis Date    Chronic pain     back    Hypertension     Rotator cuff injury      Length Of Stay: 2  Performed 2 patient identifiers during session: Name and Birthday    PHYSICAL THERAPY EVALUATION :    12/14/17 0946   Note Type   Note type Eval only   Pain Assessment   Pain Assessment FLACC   Pain Type Acute pain   Pain Location Abdomen   Pain Rating: FLACC (Rest) - Face 1   Pain Rating: FLACC (Rest) - Legs 0   Pain Rating: FLACC (Rest) - Activity 0   Pain Rating: FLACC (Rest) - Cry 1   Pain Rating: FLACC (Rest) - Consolability 0   Score: FLACC (Rest) 2   Pain Rating: FLACC (Activity) - Face 1   Pain Rating: FLACC (Activity) - Legs 0   Pain Rating: FLACC (Activity) - Activity 0   Pain Rating: FLACC (Activity) - Cry 1   Pain Rating: FLACC (Activity) - Consolability 0   Score: FLACC (Activity) 2   Home Living   Type of Home House   Home Layout One level  (no MASSIEL)   Home Equipment Walker   Prior Function   Level of Teller Needs assistance with ADLs and functional mobility; Needs assistance with IADLs   Lives With Daughter  (daughter, )   ADL Assistance Needs assistance   IADLs Needs assistance   Falls in the last 6 months (1)   Restrictions/Precautions   Weight Bearing Precautions Per Order No   Other Precautions Bed Alarm; Chair Alarm;Cognitive; Fall Risk;Pain   General   Additional Pertinent History Morphy phone  [phone] polish  Phone not present in room prior to session  When asked to further define dizziness, pt's bestdescription is that her "eyes went dark"  Pt also gesturing  that she has some issue with vertical head posture      Family/Caregiver Present No   Cognition   Overall Cognitive Status Impaired   Arousal/Participation Alert   Orientation Level Oriented to person;Disoriented to place; Disoriented to time;Oriented to situation   Memory Decreased recall of biographical information;Decreased recall of precautions   Following Commands Follows one step commands with increased time or repetition  (with gestures and one word polish commands)   RUE Strength   RUE Overall Strength (limited @ shoulder)   LUE Strength   LUE Overall Strength Within Functional Limits - able to perform ADL tasks with strength   Strength RLE   R Hip Flexion 4+/5   R Knee Extension 4+/5   R Ankle Dorsiflexion 4+/5   Strength LLE   L Hip Flexion 4+/5   L Knee Extension 4+/5   L Ankle Dorsiflexion 4+/5   Coordination   Movements are Fluid and Coordinated 0   Coordination and Movement Description tremors   Sensation (WFL grossly hearing and vision)   Light Touch   RLE Light Touch Not tested   LLE Light Touch Not tested   Bed Mobility   Supine to Sit 5  Supervision   Additional items Assist x 1; Increased time required   Transfers   Sit to Stand 5  Supervision   Additional items Assist x 1  (tactile and visual cues)   Stand to Sit 5  Supervision   Additional items Assist x 1; Increased time required  (tactile and visual cues)   Ambulation/Elevation   Gait pattern Excessively slow   Gait Assistance 5  Supervision   Additional items Assist x 1   Assistive Device Rolling walker   Distance 200'   Stair Management Assistance Not tested   Balance   Static Sitting Good   Static Standing Fair -   Ambulatory Fair -   Endurance Deficit   Endurance Deficit No   Activity Tolerance   Activity Tolerance ("dizziness")   Medical Staff Made Aware spoke to Steven MARSH   Nurse Made Aware spoke to Marta MAR and Bo Angela RN    Assessment   Prognosis Good   Problem List Impaired balance;Decreased coordination;Decreased cognition;Decreased safety awareness; Impaired judgement;Pain  ("dizziness" and eyes get dark)   Goals   Patient Goals no more dizziness (which she attributes to old Age)   STG Expiration Date 12/24/17 Short Term Goal #1 Pt will perform transfers modified I, amb w/rolling walker for 450' w/o uncorrected losses of balance around and over obsticles, I bed mobility, standing tolerance with fair balance >5 min , perform TUG and achieve patient cut off score   Treatment Day 0   Plan   Treatment/Interventions Functional transfer training;LE strengthening/ROM; Therapeutic exercise; Endurance training;Cognitive reorientation;Patient/family training;Equipment eval/education;Gait training;Bed mobility;Spoke to nursing;OT   PT Frequency 5x/wk   Recommendation   Recommendation Home with family support  (home vs OPPT (potentially @ neuro OP program))   Equipment Recommended Walker   Barthel Index   Feeding 10   Bathing 0   Grooming Score 5   Dressing Score 5   Bladder Score 10   Bowels Score 10   Toilet Use Score 5   Transfers (Bed/Chair) Score 10   Mobility (Level Surface) Score 10   Stairs Score 0   Barthel Index Score 65       (Please find full objective findings from PT assessment regarding body systems outlined above)  Assessment: Pt is a 80 y o  female seen for PT evaluation s/p admit to [facility] on 12/12/2017 w/ Postural dizziness with near syncope  Order placed for PT  Prior to admission, pt was lives in one floor environment, lives with daughter(s), used rolling walker for mobility and reportedly needed A for mobility and ADLs  Upon evaluation: Pt requires S for transfers, bed mobility and ambulation with rolling walker  The following objective measures were performed on IE: Barthel Index 65/100    Pt's clinical presentation is currently unstable/unpredictable given the functional mobility deficits above, especially impaired balance, decreased endurance, impaired coordination, gait deviations, pain, fall risk, decreased cognition and "dizziness" win which pt describes as "eyes get dark", combined with medical complications of + orthostatic BPs and elevated BPs (209/89 lying to 179/80 standing yesterday), lwo H&H and low Na on 12/12/2017  Pt to benefit from continued skilled PT tx while in hospital and upon DC to address deficits as defined above and maximize level of functional mobility  From PT/mobility standpoint, recommendation at time of d/c would be home with family support and home PT vs OPPT pending progress in order to maximize pt's functional independence and consistency w/ mobility in order to facilitate return to PLOF  Recommend trial with walker next session and higher level balance activities  Comorbidities affecting pt's physical performance at time of assessment include: HTN, limited cognition and chronic pain @ back, RTC injury  Personal factors affecting pt at time of IE include: advanced age, past experience, inability to perform IADLs, inability to perform ADLs, limited insight into impairments, recent fall(s) and preferred language not [place] (language barrier)      Shiloh Sharma, PT, DPT

## 2017-12-14 NOTE — ASSESSMENT & PLAN NOTE
· Admits to lower back pain significant history of spinal stenosis  · Follows outpatient neurosurgery Dr Tima Sorenson  · Continue home medication-Xanax 0 25 mg p r n  HS, Neurontin, Requip    Hold home medication-oxycodone

## 2017-12-19 ENCOUNTER — ALLSCRIPTS OFFICE VISIT (OUTPATIENT)
Dept: OTHER | Facility: OTHER | Age: 82
End: 2017-12-19

## 2018-01-10 NOTE — RESULT NOTES
Discussion/Summary   Mildly elevated cholesterol but otherwise excellent labs  No need for any changes in medications  Please follow up in 6 months     Verified Results  (1) CBC/PLT/DIFF 23Oct2017 08:26AM Kumar Lucas     Test Name Result Flag Reference   WBC COUNT 5 75 Thousand/uL  4 31-10 16   RBC COUNT 4 23 Million/uL  3 81-5 12   HEMOGLOBIN 13 5 g/dL  11 5-15 4   HEMATOCRIT 39 9 %  34 8-46  1   MCV 94 fL  82-98   MCH 31 9 pg  26 8-34 3   MCHC 33 8 g/dL  31 4-37 4   RDW 14 5 %  11 6-15 1   MPV 9 3 fL  8 9-12 7   PLATELET COUNT 221 Thousands/uL  149-390   NEUTROPHILS RELATIVE PERCENT 48 %  43-75   LYMPHOCYTES RELATIVE PERCENT 42 %  14-44   MONOCYTES RELATIVE PERCENT 8 %  4-12   EOSINOPHILS RELATIVE PERCENT 1 %  0-6   BASOPHILS RELATIVE PERCENT 1 %  0-1   NEUTROPHILS ABSOLUTE COUNT 2 81 Thousands/? ??L  1 85-7 62   LYMPHOCYTES ABSOLUTE COUNT 2 40 Thousands/? ??L  0 60-4 47   MONOCYTES ABSOLUTE COUNT 0 47 Thousand/? ??L  0 17-1 22   EOSINOPHILS ABSOLUTE COUNT 0 03 Thousand/? ??L  0 00-0 61   BASOPHILS ABSOLUTE COUNT 0 04 Thousands/? ??L  0 00-0 10     (1) COMPREHENSIVE METABOLIC PANEL [id number] 56:30UU Kumar Lucas     Test Name Result Flag Reference   SODIUM 137 mmol/L  136-145   POTASSIUM 4 5 mmol/L  3 5-5 3   CHLORIDE 100 mmol/L  100-108   CARBON DIOXIDE 29 mmol/L  21-32   ANION GAP (CALC) 8 mmol/L  4-13   BLOOD UREA NITROGEN 21 mg/dL  5-25   CREATININE 1 21 mg/dL  0 60-1 30   Standardized to IDMS reference method   CALCIUM 8 9 mg/dL  8 3-10 1   BILI, TOTAL 0 30 mg/dL  0 20-1 00   ALK PHOSPHATAS 81 U/L     ALT (SGPT) 24 U/L  12-78   Specimen collection should occur prior to Sulfasalazine administration due to the potential for falsely depressed results  AST(SGOT) 26 U/L  5-45   Specimen collection should occur prior to Sulfasalazine administration due to the potential for falsely depressed results     ALBUMIN 3 4 g/dL L 3 5-5 0   TOTAL PROTEIN 7 3 g/dL  6 4-8 2   eGFR 39 ml/min/1 73sq m     [place] [employer] Disease Education Program recommendations are as follows:  GFR calculation is accurate only with a steady state creatinine  Chronic Kidney disease less than 60 ml/min/1 73 sq  meters  Kidney failure less than 15 ml/min/1 73 sq  meters  GLUCOSE FASTING 97 mg/dL  65-99   Specimen collection should occur prior to Sulfasalazine administration due to the potential for falsely depressed results  Specimen collection should occur prior to Sulfapyridine administration due to the potential for falsely elevated results  (1) LIPID PANEL, FASTING 23Oct2017 08:26AM [facility]     Test Name Result Flag Reference   CHOLESTEROL 231 mg/dL H    HDL,DIRECT 58 mg/dL  40-60   Specimen collection should occur prior to Metamizole administration due to the potential for falsley depressed results  LDL CHOLESTEROL CALCULATED 142 mg/dL H 0-100   Triglyceride:        Normal <150 mg/dl   Borderline High 150-199 mg/dl   High 200-499 mg/dl   Very High >499 mg/dl      Cholesterol:       Desirable <200 mg/dl    Borderline High 200-239 mg/dl    High >239 mg/dl      HDL Cholesterol:       High>59 mg/dL    Low <41 mg/dL      This screening LDL is a calculated result  It does not have the accuracy of the Direct Measured LDL in the monitoring of patients with hyperlipidemia and/or statin therapy  Direct Measure LDL (WTP588) must be ordered separately in these patients  TRIGLYCERIDES 154 mg/dL H <=150   Specimen collection should occur prior to N-Acetylcysteine or Metamizole administration due to the potential for falsely depressed results  (1) TSH WITH FT4 REFLEX 23Oct2017 08:26AM [facility]     Test Name Result Flag Reference   TSH 1 919 uIU/mL  0 358-3 740   Patients undergoing fluorescein dye angiography may retain small amounts of fluorescein in the body for 48-72 hours post procedure  Samples containing fluorescein can produce falsely depressed TSH values   If the patient had this procedure,a specimen should be resubmitted post fluorescein clearance            The recommended reference ranges for TSH during pregnancy are as follows:  First trimester 0 1 to 2 5 uIU/mL  Second trimester  0 2 to 3 0 uIU/mL  Third trimester 0 3 to 3 0 uIU/m

## 2018-01-12 VITALS
HEART RATE: 64 BPM | SYSTOLIC BLOOD PRESSURE: 140 MMHG | TEMPERATURE: 97.5 F | WEIGHT: 145 LBS | RESPIRATION RATE: 14 BRPM | HEIGHT: 58 IN | BODY MASS INDEX: 30.44 KG/M2 | DIASTOLIC BLOOD PRESSURE: 82 MMHG

## 2018-01-12 NOTE — RESULT NOTES
Verified Results  (1) URINALYSIS w URINE C/S REFLEX (will reflex a microscopy if leukocytes, occult blood, or nitrites are not within normal limits) [id number] 12:00AM AdrianJosette todd     Test Name Result Flag Reference   SPECIFIC GRAVITY 1 009  1 001-1 035   BILIRUBIN NEGATIVE  NEGATIVE   GLUCOSE NEGATIVE  NEGATIVE   COLOR YELLOW  YELLOW   APPEARANCE CLEAR  CLEAR   PH 5 5  5 0-8 0   KETONES NEGATIVE  NEGATIVE   OCCULT BLOOD 1+ A NEGATIVE   PROTEIN NEGATIVE  NEGATIVE   SQUAMOUS EPITHELIAL CELLS NONE SEEN /HPF  < OR = 5   HYALINE CAST NONE SEEN /LPF  NONE SEEN   REFLEXIVE URINE CULTURE      CULTURE INDICATED - RESULTS TO FOLLOW   RBC 0-2 /HPF  < OR = 2   NITRITE POSITIVE A NEGATIVE   LEUKOCYTE ESTERASE 1+ A NEGATIVE   WBC 0-5 /HPF  < OR = 5   BACTERIA MODERATE /HPF A NONE SEEN     REFLEXIVE URINE CULTURE [id number] 12:00AM AdrianJosette todd     Test Name Result Flag Reference   CULTURE, URINE, ROUTINE      CULTURE, URINE, ROUTINE         MICRO NUMBER:      [id number]    TEST STATUS:       FINAL    SPECIMEN SOURCE:   URINE    SPECIMEN QUALITY:  ADEQUATE    RESULT:            No Growth

## 2018-01-13 VITALS
RESPIRATION RATE: 16 BRPM | TEMPERATURE: 96.9 F | BODY MASS INDEX: 30.44 KG/M2 | HEART RATE: 78 BPM | SYSTOLIC BLOOD PRESSURE: 128 MMHG | HEIGHT: 58 IN | DIASTOLIC BLOOD PRESSURE: 64 MMHG | WEIGHT: 145 LBS

## 2018-01-13 VITALS
DIASTOLIC BLOOD PRESSURE: 73 MMHG | TEMPERATURE: 97.7 F | WEIGHT: 143 LBS | SYSTOLIC BLOOD PRESSURE: 151 MMHG | RESPIRATION RATE: 16 BRPM | BODY MASS INDEX: 30.02 KG/M2 | HEART RATE: 67 BPM | HEIGHT: 58 IN

## 2018-01-13 VITALS — SYSTOLIC BLOOD PRESSURE: 126 MMHG | DIASTOLIC BLOOD PRESSURE: 78 MMHG | HEART RATE: 62 BPM

## 2018-01-13 NOTE — RESULT NOTES
Verified Results  VAS LOWER LIMB VENOUS DUPLEX STUDY, UNILATERAL/LIMITED [id number] 02:49PM Horacio Olivarez Order Number: [id number]    - Patient Instructions: To schedule this appointment, please contact Central Scheduling at [phone]  Test Name Result Flag Reference   VAS LOWER LIMB VENOUS DUPLEX STUDY, UNILATERAL/LIMITED (Report)     [facility] REPORT   CLINICAL:   Indications: Right Localized edema [R60 0]  Left Limb Pain [M79 609]  Patient   presents with right calf edema and pain x several days  The patient has no history of DVT  Clinical:         CONCLUSION:   Impression:   RIGHT LOWER LIMB: NORMAL   No evidence of acute or chronic deep vein thrombosis   No evidence of superficial thrombophlebitis noted  Doppler evaluation shows a normal response to augmentation maneuvers  Popliteal, posterior tibial and anterior tibial arterial Doppler waveforms are   triphasic  Tech note: There is a channel from the baker's cyst to a hypoechoic   non-vascular structure in the proximal to distal calf, which suggests a rupture   of the cyst with fluid into the medial calf  LEFT LOWER LIMB LIMITED: NORMAL   Evaluation shows no evidence of thrombus in the common femoral vein  Doppler evaluation shows a normal response to augmentation maneuvers        SIGNATURE:   Electronically Signed by: Megha Encinas MD, [address] on 2017-05-26 09:20:29 PM

## 2018-01-14 VITALS
DIASTOLIC BLOOD PRESSURE: 70 MMHG | HEIGHT: 58 IN | BODY MASS INDEX: 30.23 KG/M2 | RESPIRATION RATE: 16 BRPM | WEIGHT: 144 LBS | HEART RATE: 70 BPM | TEMPERATURE: 98.4 F | SYSTOLIC BLOOD PRESSURE: 146 MMHG

## 2018-01-14 VITALS
HEIGHT: 58 IN | HEART RATE: 68 BPM | RESPIRATION RATE: 16 BRPM | SYSTOLIC BLOOD PRESSURE: 132 MMHG | BODY MASS INDEX: 30.02 KG/M2 | WEIGHT: 143 LBS | DIASTOLIC BLOOD PRESSURE: 78 MMHG

## 2018-01-17 NOTE — RESULT NOTES
Verified Results  * XR SPINE LUMBAR MINIMUM 4 VIEWS NON INJURY 23Oct2017 08:16AM Micheal Galaviz Order Number: [id number]     Test Name Result Flag Reference   XR SPINE LUMBAR MINIMUM 4 VIEWS (Report)     LUMBAR SPINE   Study performed October 23, 2017 at 8:40 AM  Study submitted for interpretation on October 25, 2017 at 11:30 AM      INDICATION: Chronic low back pain radiating down both legs  No history of trauma  COMPARISON: July 26, 2014     VIEWS: AP, lateral, bilateral oblique and coned down projections     IMAGES: 5     FINDINGS:     Stable grade 1 anterolisthesis L4 on L5  Osseous structures demineralized  Severe compression fracture of T12 with vertebral plana  Mild bony retropulsion of the posterior aspect of the T12 vertebral body  Progressive disc space narrowing diffusely throughout the lumbar spine, most pronounced L5-S1  Vacuum disc L5-S1  Diffuse facet hypertrophic changes throughout the lumbar spine, most pronounced L3-L4, L4-L5 and L5-S1  Postoperative changes at L3-L4    and L4-L5  Hardware appears stable  Visualized soft tissues appear unremarkable  IMPRESSION:   1  Severe compression fracture with vertebral plana involving the T12 vertebral body  Bony retropulsion of the posterior aspect of T12  These findings are new when compared to the prior study  2  Progressive degenerative changes of the lumbar spine         ##imslh##imslh       Workstation performed: [id number]     Signed by:   Emerson Monroy DO   10/26/17

## 2018-01-18 NOTE — RESULT NOTES
Discussion/Summary   Therapeutic amiodarone level  Continue same  Verified Results  (1) AMIODARONE 23Oct2017 08:26AM Micheal Galaviz Order Number: [id number]     Test Name Result Flag Reference   AMIODARONE   1 5 ug/mL  1 0 - 2 5   Detection Limit = 0 2   DESETHYLAMIODAR 1 4 ug/mL  1 0 - 2 5   Detection Limit = 0 2    Performed at:  [address]  [id number]  : Lonnie Samuels MD, Phone:  [phone]

## 2018-01-23 NOTE — MISCELLANEOUS
Assessment    1  Fall in elderly patient (E888 9) (R29 6)   2  Lumbar stenosis with neurogenic claudication (724 03) (M48 062)   3  Benign essential hypertension (401 1) (I10)   4  Dizziness and giddiness (780 4) (R42)    Discussion/Summary  Discussion Summary:   -TCM visit completed  Workup that was conducted in the hospital was sufficient  Patient did have imaging performed as well as blood work  No obvious abnormality  -patient does have episodes of dizziness  These are not easily explained  They did reduce her medications including her dose of metoprolol  At this time she is only on Toprol XL 25 mg once daily  I did advise the daughter that she could try splitting her dose of Toprol in half however she states that she does occasionally have periodic elevation of her blood pressure  She would not like to do this  -patient does ambulate with a walker  She is unable to fit a walker inside of the bathroom as there is no space to pivot the walker  She does have a toilet seat with handlebars  She does have grab bars   -patient will follow up as scheduled  Counseling Documentation With Imm: The patient, patient's family was counseled regarding instructions for management, prognosis, impressions  Medication SE Review and Pt Understands Tx: Possible side effects of new medications were reviewed with the patient/guardian today  The treatment plan was reviewed with the patient/guardian  The patient/guardian understands and agrees with the treatment plan      Chief Complaint  Chief Complaint Free Text Note Form: TCM      History of Present Illness  TCM Communication [facility]: The patient is being contacted for follow-up after hospitalization  She was hospitalized at [facility]  The date of admission: 12/12/17, date of discharge: 12/14/17  She was discharged to home  Medications reviewed and updated today  She scheduled a follow up appointment  The patient is currently asymptomatic     Communication performed and completed by Norma Gonzalez   HPI: 66-year-old female presents with her daughter for follow-up after hospitalization at [facility] following a fall at home on December 12  She was released on December 14th  Patient only speaks [place]  Her daughter is her primary caregiver and translates for her  Apparently the patient had just had a bowel movement and had gotten up from the toilet was fully dressed and was actually walking out to get her walker when she became dizzy and fell to the ground  Reportedly there was no loss of consciousness  She did have extensive workup and was seen by Cardiology  She has intermittently had these episodes of dizziness which are not able to be explained  She was given a prescription for meclizine but this just makes her very tired and sleepy    She does have a prior history of atrial fibrillation  She does ambulate with the assistance of a walker  Her daughter does have the bathroom in the house designed so that she is able to grab on to the sink or a grab bar before she gets to the toilet  There are throw rugs in place but she is afraid that if they did not have the rugs then she would actually landed on the hard tile floor  She does have a contusion of her right hand but it was x-rayed and there are no fractures  Patient did recently receive corticosteroid injection into the SI joint  Her daughter finds that she is ambulating better after this  Review of Systems  Complete-Female:   Constitutional: No fever, no chills, feels well, no tiredness, no recent weight gain or weight loss  Eyes: No complaints of eye pain, no red eyes, no eyesight problems, no discharge, no dry eyes, no itching of eyes  ENT: no complaints of earache, no loss of hearing, no nose bleeds, no nasal discharge, no sore throat, no hoarseness     Cardiovascular: lower extremity edema, but the heart rate was not slow, no chest pain, no intermittent leg claudication, the heart rate was not fast and no palpitations  Respiratory: No complaints of shortness of breath, no wheezing, no cough, no SOB on exertion, no orthopnea, no PND  Gastrointestinal: as noted in HPI, no abdominal pain, no nausea, no vomiting, no constipation, no diarrhea and no blood in stools  Genitourinary: No complaints of dysuria, no incontinence, no pelvic pain, no dysmenorrhea, no vaginal discharge or bleeding  Musculoskeletal: arthralgias, joint stiffness and Ambulates with the assistance of a walker, but as noted in HPI  Integumentary: No complaints of skin rash or lesions, no itching, no skin wounds, no breast pain or lump  Neurological: numbness and tingling, but no headache, no confusion, no dizziness, no convulsions and no fainting  Psychiatric: not suicidal, no anxiety, no personality change, no sleep disturbances, no depression and no emotional problems  Endocrine: No complaints of proptosis, no hot flashes, no muscle weakness, no deepening of the voice, no feelings of weakness  Hematologic/Lymphatic: No complaints of swollen glands, no swollen glands in the neck, does not bleed easily, does not bruise easily  Active Problems    1  Atrial fibrillation (427 31) (I48 91)   2  Benign essential hypertension (401 1) (I10)   3  Cataract (366 9) (H26 9)   4  Change in stool habits (787 99) (R19 4)   5  Depression (311) (F32 9)   6  Dizziness and giddiness (780 4) (R42)   7  Dysfunction of Eustachian tube, unspecified laterality (381 81) (H69 80)   8  Dysuria (788 1) (R30 0)   9  Edema of right lower extremity (782 3) (R60 0)   10  Gastroparesis (536 3) (K31 84)   11  Hyperlipidemia (272 4) (E78 5)   12  Hyponatremia (276 1) (E87 1)   13  Insomnia (780 52) (G47 00)   14  Lumbago (724 2) (M54 5)   15  Lumbar stenosis with neurogenic claudication (724 03) (M48 062)   16  Mixed stress and urge urinary incontinence (788 33) (N39 46)   17  Peripheral neuropathy (356 9) (G62 9)   18   Restless legs syndrome (333 94) (G25 81) 19  Right shoulder pain (719 41) (M25 511)   20  Rosacea, acne (695 3) (L71 9)   21  Senile dementia without behavioral disturbance (290 0) (F03 90)   22  Stasis edema of right lower extremity (459 30) (I87 301)   23  T12 compression fracture (805 2) (S22 080A)   24  Tremor (781 0) (R25 1)    Past Medical History    1  History of Chest pain (786 50) (R07 9)   2  History of Gait disturbance (781 2) (R26 9)   3  History of abdominal pain (V13 89) (Z87 898)   4  History of acute bronchitis (V12 69) (Z87 09)   5  History of melena (V12 79) (Z87 19)   6  History of urinary tract infection (V13 02) (Z87 440)   7  History of wheezing (V12 69) (Z87 898)   8  History of Hypoxemia (799 02) (R09 02)   9  History of Tension type headache (339 10) (G44 209)    Surgical History    1  History of Back Surgery  Surgical History Reviewed: The surgical history was reviewed and updated today  Family History  Mother    1  No pertinent family history    Social History    · Lives with family   · Never A Smoker  Social History Reviewed: The social history was reviewed and updated today  The social history was reviewed and is unchanged  Current Meds   1  ALPRAZolam 0 25 MG Oral Tablet; TAKE 1 TABLET EVERY 6 TO 8 HOURS AS NEEDED; Therapy: 93LGR9109 to (Evaluate:19Jan2018); Last Rx:19Oct2017 Ordered   2  Amiodarone HCl - 200 MG Oral Tablet; TAKE 1 TABLET DAILY; Therapy: 16Osi5661 to (Evaluate:68Xff6823)  Requested for: 81VDB9687; Last   Rx:22May2017 Ordered   3  Aspirin 81 MG TABS; take 2 tablet daily; Therapy: 77DUM2322 to (Evaluate:99Iso2197) Recorded   4  Famotidine 20 MG Oral Tablet; Therapy: (Recorded:41Tic6865) to Recorded   5  Gabapentin 100 MG Oral Capsule; TAKE 1 CAPSULE TWICE DAILY; Therapy: 21XAN4586 to (Evaluate:17May2018)  Requested for: 51NYJ4070; Last   Rx:22May2017 Ordered   6  Linzess 145 MCG Oral Capsule; take 1 capsule daily;    Therapy: 15FMY2985 to (Last Rx:44Wwv4759)  Requested for: 96CCV8905 Ordered   7  Lisinopril 10 MG Oral Tablet; take 1 tablet by mouth every day; Therapy: 03CVP7200 to (Evaluate:35Ujk3639)  Requested for: 91IDQ3915; Last   Rx:35Hsc5058 Ordered   8  Magnesium 400 MG CAPS; take 1 capsule daily; Therapy: 05Sxj0858 to Recorded   9  Metoprolol Succinate ER 25 MG Oral Tablet Extended Release 24 Hour; Therapy: (Recorded:46Mhe9256) to Recorded   10  MetroNIDAZOLE 0 75 % External Cream; APPLY AND GENTLY MASSAGE INTO    AFFECTED AREA(S) TWICE DAILY; Therapy: 97BCV8162 to (Last Rx:27Apr2016)  Requested for: 17Saa7427 Ordered   11  ROPINIRole HCl - 2 MG Oral Tablet; TAKE 1 TABLET AT BEDTIME; Therapy: 99LBO0881 to (Gardenia Ashby)  Requested for: 76YUR2111; Last    Rx:39Ptb6436 Ordered   12  Tolterodine Tartrate ER 2 MG Oral Capsule Extended Release 24 Hour; take 1 capsule    daily; Therapy: 80EOL4843 to (Evaluate:41Jcz9610)  Requested for: 40DRA7374; Last    Rx:89Poo8291 Ordered   13  TraZODone HCl - 50 MG Oral Tablet; Take 1 tablet by mouth at bedtime; Therapy: 06JDZ3224 to (Evaluate:13Xrr4014)  Requested for: David Quiroga; Last    Rx:10Qsg5420 Ordered  Medication List Reviewed: The medication list was reviewed and updated today  Allergies    1  morphine    Vitals  Signs   Recorded: 85OBV6720 11:07AM   Heart Rate: 60  Respiration: 14  Systolic: 929  Diastolic: 72  Height: 4 ft 10 in  Weight: 144 lb   BMI Calculated: 30 1  BSA Calculated: 1 58    Physical Exam    Constitutional   General appearance: Abnormal   well developed, acutely ill, comfortable, well nourished and well hydrated  No acute distress, elderly and frail  Ears, Nose, Mouth, and Throat   Oropharynx: Normal with no erythema, edema, exudate or lesions  Pulmonary   Respiratory effort: No increased work of breathing or signs of respiratory distress  Auscultation of lungs: Clear to auscultation  Cardiovascular   Palpation of heart: Normal PMI, no thrills      Auscultation of heart: Normal rate and rhythm, normal S1 and S2, without murmurs  Examination of extremities for edema and/or varicosities: Abnormal   no left ankle edema and no left pretibial edema  Carotid pulses: Normal     Abdomen   Abdomen: Non-tender, no masses  Bowel sounds were normal  The abdomen was soft and nontender  no masses palpated  Liver and spleen: No hepatomegaly or splenomegaly  Musculoskeletal   Gait and station: Abnormal   Gait evaluation demonstrated stooping and antalgia bilaterally  Ambulates with the assistance of a walker  Inspection/palpation of joints, bones, and muscles: Abnormal   Diffuse osteoarthritic changes in both hands and shoulders  Patient does have significant ecchymoses of the right wrist and forearm  No significant deformity or swelling  Skin   Skin and subcutaneous tissue: Normal without rashes or lesions  Neurologic   Cranial nerves: Cranial nerves 2-12 intact  Reflexes: 2+ and symmetric  Health Management  Health Maintenance   Medicare Annual Wellness Visit; every 1 year; Last 10RHT9177; Next Due: 52Hxm8953; Overdue    Future Appointments    Date/Time Provider Specialty Site   01/09/2018 11:00 AM ASHKAN Wright   Cardiology [address]     Signatures   Electronically signed by : Ermias Morales, ; Dec 15 2017  8:24AM EST                       (Author)    Electronically signed by : Celio Simmons DO; Dec 20 2017  2:26PM EST                       (Author)

## 2018-01-24 VITALS
BODY MASS INDEX: 30.23 KG/M2 | SYSTOLIC BLOOD PRESSURE: 142 MMHG | HEART RATE: 60 BPM | RESPIRATION RATE: 14 BRPM | WEIGHT: 144 LBS | DIASTOLIC BLOOD PRESSURE: 72 MMHG | HEIGHT: 58 IN

## 2018-03-01 DIAGNOSIS — R42 VERTIGO: Primary | ICD-10-CM

## 2018-03-01 RX ORDER — MECLIZINE HCL 12.5 MG/1
TABLET ORAL
Qty: 30 TABLET | Refills: 1 | Status: SHIPPED | OUTPATIENT
Start: 2018-03-01 | End: 2018-03-21 | Stop reason: SDUPTHER

## 2018-03-07 NOTE — PROCEDURES
Procedure      Pre-procedure Diagnosis: Lumbar spinal stenosis with neurogenic claudication  Post-procedure Diagnosis: Lumbar spinal stenosis with neurogenic claudication  Procedure Title(s):  1  Right L4 transforaminal epidural steroid injection 2  Right L5 transforaminal epidural steroid injection  Attending Surgeon:   Nicole Palomo MD  Anesthesia:   Local     Indications: The patient is a 80year-old female with a diagnosis of lumbar spinal stenosis with neurogenic claudication  The patient's history and physical exam were reviewed  The risks, benefits and alternatives to the procedure were discussed, and all questions were answered to the patient's satisfaction  The patient agreed to proceed, and written informed consent was obtained  Procedure in Detail: The patient was brought into the procedure room and placed in the prone position on the fluoroscopy table  The area of the lumbar spine was prepped with chloraprep solution then draped in a sterile manner  The L4 vertebral body was identified with AP fluoroscopy  An oblique view to the right was obtained to better visualize the inferior junction of the pedicle and transverse process  The 6 o'clock position of the pedicle was marked and identified  The skin and subcutaneous tissues in the area were anesthetized with 1% lidocaine  A 22-gauge, 5 inch needle was directed toward the targeted point under fluoroscopy until bone was contacted  The needle was then walked inferiorly until the neural foramen was entered  A lateral fluoroscopic view was then used to place the needle tip at the 10 o'clock position of the foramen  The same procedure was repeated for the right L5 level    Negative aspiration was confirmed, and 1 ml Omnipaque 300 was injected at each level  Appropriate neurograms were observed under AP fluoroscopy  Digital subtraction angiography was performed showing no vascular uptake and appropriate spread in the epidural space   Then, after negative aspiration, a solution consisting of 1 mL 0 25% bupivacaine and 0 5 mL depo-medrol (80mg/mL) was easily injected at each level  The needles were removed with a 1% lidocaine flush  The patient's back was cleaned and a bandage was placed over the needle insertion points  Disposition: The patient tolerated the procedure well, and there were no apparent complications  The patient was taken to the recovery area where written discharge instructions for the procedure were given             Signatures   Electronically signed by : Benedicto Sousa MD; Nov 21 2017  9:10AM EST                       (Author)

## 2018-03-12 DIAGNOSIS — K59.03 CONSTIPATION DUE TO PAIN MEDICATION: Primary | ICD-10-CM

## 2018-03-12 RX ORDER — LINACLOTIDE 145 UG/1
CAPSULE, GELATIN COATED ORAL
Qty: 30 CAPSULE | Refills: 5 | Status: SHIPPED | OUTPATIENT
Start: 2018-03-12 | End: 2019-03-24 | Stop reason: SDUPTHER

## 2018-03-21 DIAGNOSIS — R42 VERTIGO: ICD-10-CM

## 2018-03-21 RX ORDER — MECLIZINE HCL 12.5 MG/1
TABLET ORAL
Qty: 30 TABLET | Refills: 1 | Status: SHIPPED | OUTPATIENT
Start: 2018-03-21 | End: 2018-04-12 | Stop reason: SDUPTHER

## 2018-04-12 DIAGNOSIS — R42 VERTIGO: ICD-10-CM

## 2018-04-12 RX ORDER — MECLIZINE HCL 12.5 MG/1
TABLET ORAL
Qty: 30 TABLET | Refills: 1 | Status: SHIPPED | OUTPATIENT
Start: 2018-04-12 | End: 2018-04-18

## 2018-04-17 RX ORDER — HYDROCODONE BITARTRATE AND ACETAMINOPHEN 5; 325 MG/1; MG/1
1 TABLET ORAL EVERY 6 HOURS
COMMUNITY
Start: 2015-04-09 | End: 2018-04-18 | Stop reason: SDUPTHER

## 2018-04-17 RX ORDER — METOPROLOL SUCCINATE 100 MG/1
1 TABLET, EXTENDED RELEASE ORAL DAILY
COMMUNITY
Start: 2017-01-03 | End: 2018-04-18

## 2018-04-17 RX ORDER — LISINOPRIL 10 MG/1
1 TABLET ORAL DAILY
COMMUNITY
Start: 2017-01-03 | End: 2018-06-28 | Stop reason: SDUPTHER

## 2018-04-17 RX ORDER — TOLTERODINE 2 MG/1
1 CAPSULE, EXTENDED RELEASE ORAL DAILY
COMMUNITY
Start: 2017-02-23 | End: 2018-04-18

## 2018-04-18 ENCOUNTER — HOSPITAL ENCOUNTER (OUTPATIENT)
Dept: RADIOLOGY | Facility: HOSPITAL | Age: 83
Discharge: HOME | End: 2018-04-18
Payer: MEDICARE

## 2018-04-18 ENCOUNTER — TRANSCRIBE ORDERS (OUTPATIENT)
Dept: ADMINISTRATIVE | Facility: HOSPITAL | Age: 83
End: 2018-04-18

## 2018-04-18 ENCOUNTER — OFFICE VISIT (OUTPATIENT)
Dept: FAMILY MEDICINE CLINIC | Facility: CLINIC | Age: 83
End: 2018-04-18
Payer: MEDICARE

## 2018-04-18 VITALS
OXYGEN SATURATION: 98 % | DIASTOLIC BLOOD PRESSURE: 74 MMHG | HEART RATE: 60 BPM | SYSTOLIC BLOOD PRESSURE: 122 MMHG | HEIGHT: 59 IN | BODY MASS INDEX: 29.64 KG/M2 | RESPIRATION RATE: 16 BRPM | WEIGHT: 147 LBS

## 2018-04-18 DIAGNOSIS — M25.511 ACUTE PAIN OF RIGHT SHOULDER: Primary | ICD-10-CM

## 2018-04-18 DIAGNOSIS — M25.521 PAIN IN JOINT OF RIGHT ELBOW: ICD-10-CM

## 2018-04-18 DIAGNOSIS — I10 ESSENTIAL HYPERTENSION: ICD-10-CM

## 2018-04-18 DIAGNOSIS — M19.011 ARTHRITIS OF RIGHT SHOULDER REGION: ICD-10-CM

## 2018-04-18 DIAGNOSIS — Z91.81 STATUS POST FALL: ICD-10-CM

## 2018-04-18 DIAGNOSIS — G89.4 CHRONIC PAIN SYNDROME: ICD-10-CM

## 2018-04-18 DIAGNOSIS — F51.01 PRIMARY INSOMNIA: ICD-10-CM

## 2018-04-18 PROBLEM — S63.501A RIGHT WRIST SPRAIN: Status: RESOLVED | Noted: 2017-12-12 | Resolved: 2018-04-18

## 2018-04-18 PROBLEM — M48.062 LUMBAR STENOSIS WITH NEUROGENIC CLAUDICATION: Status: ACTIVE | Noted: 2017-11-07

## 2018-04-18 PROCEDURE — 20610 DRAIN/INJ JOINT/BURSA W/O US: CPT | Performed by: FAMILY MEDICINE

## 2018-04-18 PROCEDURE — 99214 OFFICE O/P EST MOD 30 MIN: CPT | Performed by: FAMILY MEDICINE

## 2018-04-18 PROCEDURE — 73080 X-RAY EXAM OF ELBOW: CPT

## 2018-04-18 RX ORDER — METHYLPREDNISOLONE ACETATE 40 MG/ML
1 INJECTION, SUSPENSION INTRA-ARTICULAR; INTRALESIONAL; INTRAMUSCULAR; SOFT TISSUE
Status: COMPLETED | OUTPATIENT
Start: 2018-04-18 | End: 2018-04-18

## 2018-04-18 RX ORDER — LIDOCAINE HYDROCHLORIDE 10 MG/ML
1 INJECTION, SOLUTION INFILTRATION; PERINEURAL
Status: COMPLETED | OUTPATIENT
Start: 2018-04-18 | End: 2018-04-18

## 2018-04-18 RX ORDER — HYDROCODONE BITARTRATE AND ACETAMINOPHEN 5; 325 MG/1; MG/1
1 TABLET ORAL EVERY 6 HOURS
Qty: 90 TABLET | Refills: 0 | Status: SHIPPED | OUTPATIENT
Start: 2018-04-18 | End: 2019-04-01 | Stop reason: SDUPTHER

## 2018-04-18 RX ORDER — FAMOTIDINE 20 MG/1
TABLET, FILM COATED ORAL
Refills: 0 | COMMUNITY
Start: 2018-04-10 | End: 2019-06-11 | Stop reason: SDUPTHER

## 2018-04-18 RX ORDER — RANIBIZUMAB 10 MG/ML
INJECTION, SOLUTION INTRAVITREAL
Refills: 0 | COMMUNITY
Start: 2018-01-10 | End: 2018-04-18

## 2018-04-18 RX ORDER — ALPRAZOLAM 0.25 MG/1
0.25 TABLET ORAL 2 TIMES DAILY PRN
Qty: 60 TABLET | Refills: 5 | Status: SHIPPED | OUTPATIENT
Start: 2018-04-18 | End: 2018-11-14 | Stop reason: SDUPTHER

## 2018-04-18 RX ORDER — METOPROLOL SUCCINATE 25 MG/1
TABLET, EXTENDED RELEASE ORAL
Refills: 0 | COMMUNITY
Start: 2018-04-10 | End: 2019-04-01

## 2018-04-18 RX ADMIN — METHYLPREDNISOLONE ACETATE 1 ML: 40 INJECTION, SUSPENSION INTRA-ARTICULAR; INTRALESIONAL; INTRAMUSCULAR; SOFT TISSUE at 09:26

## 2018-04-18 RX ADMIN — LIDOCAINE HYDROCHLORIDE 1 ML: 10 INJECTION, SOLUTION INFILTRATION; PERINEURAL at 09:26

## 2018-04-18 NOTE — PROGRESS NOTES
Subjective:      Patient ID: Tere West is a 80 y o  female  40-year-old female presents with her daughter who does perform translation for her  Patient complains of right elbow and arm pain which seems to be getting progressively worse  Patient did sustain a fall at home in December  She was hospitalized  She had x-rays performed of her right hand, wrist and shoulder  There is no x-ray examination of her elbow  Daughter states that she has been complaining of this pain but she thought that it was related to her fall and bruising as opposed to anything structural   Patient did have x-ray of the right shoulder which did show degenerative arthritis but no bony abnormalities  No fracture  She has difficulty putting pressure on her hand when she is using her rolling walker because it causes pain in her elbow that shoots up her arm  No recent falls  Patient does need refill of p r n  Alprazolam which she does use for insomnia  Her Medicare Part D send out a letter stating that they would not cover the prescription because it exceeded a  Quantity limit of 4 per day? Patient only takes it once or twice per day  She also needs refill of p r n  Vicodin  Patient does have spinal stenosis and chronic pain  Past Medical History:   Diagnosis Date    Chest pain     last assessed-4/15/2013    Chronic pain     back    Gait disturbance     last assessed-2/12/2014    Hypertension     Hypoxemia     last assessed-1/5/2015    Melena     last assessed-2/23/2017    Rotator cuff injury     Wheezing     last assessed-1/5/2015       Family History   Problem Relation Age of Onset    Heart attack Mother        Past Surgical History:   Procedure Laterality Date    APPENDECTOMY      BACK SURGERY      EYE SURGERY          reports that she has never smoked  She has never used smokeless tobacco  She reports that she does not drink alcohol or use drugs        Current Outpatient Prescriptions:    ALPRAZolam (XANAX) 0 25 mg tablet, Take 1 tablet (0 25 mg total) by mouth 2 (two) times a day as needed for anxiety or sleep, Disp: 60 tablet, Rfl: 5    amiodarone 200 mg tablet, Take 200 mg by mouth daily, Disp: , Rfl:     aspirin 81 MG tablet, Take 81 mg by mouth 2 (two) times a day, Disp: , Rfl:     dorzolamide-timolol (COSOPT) 22 3-6 8 MG/ML ophthalmic solution, Administer 1 drop into the left eye 2 (two) times a day, Disp: , Rfl:     famotidine (PEPCID) 20 mg tablet, , Disp: , Rfl: 0    gabapentin (NEURONTIN) 100 mg capsule, Take 100 mg by mouth 2 (two) times a day, Disp: , Rfl:     HYDROcodone-acetaminophen (NORCO) 5-325 mg per tablet, Take 1 tablet by mouth every 6 (six) hours Max Daily Amount: 4 tablets, Disp: 90 tablet, Rfl: 0    LINZESS 145 MCG CAPS, take 1 capsule by mouth once daily, Disp: 30 capsule, Rfl: 5    lisinopril (ZESTRIL) 10 mg tablet, Take 1 tablet by mouth daily, Disp: , Rfl:     loteprednol etabonate (LOTEMAX) 0 5 % ophthalmic suspension, Administer 1 drop to both eyes 3 (three) times a day, Disp: , Rfl:     Magnesium 400 MG CAPS, Take 400 mg by mouth daily, Disp: , Rfl:     metoprolol succinate (TOPROL-XL) 25 mg 24 hr tablet, , Disp: , Rfl: 0    metroNIDAZOLE (METROCREAM) 0 75 % cream, Apply topically 2 (two) times a day, Disp: , Rfl:     Omega-3 Fatty Acids (FISH OIL) 1,000 mg, Take 1,000 mg by mouth 2 (two) times a day, Disp: , Rfl:     rOPINIRole (REQUIP XL) 2 MG 24 hr tablet, Take 2 mg by mouth daily, Disp: , Rfl:     traZODone (DESYREL) 50 mg tablet, Take 50 mg by mouth daily at bedtime, Disp: , Rfl:     metoprolol succinate (TOPROL-XL) 25 mg 24 hr tablet, Take 1 tablet by mouth daily for 30 days, Disp: 30 tablet, Rfl: 0    The following portions of the patient's history were reviewed and updated as appropriate: allergies, current medications, past family history, past medical history, past social history, past surgical history and problem list     Review of Systems Constitutional: Negative  Cardiovascular: Negative  Musculoskeletal: Positive for arthralgias ( right shoulder pain) and gait problem (  Antalgic gait)  Neurological: Negative for weakness  Objective:    /74   Pulse 60   Resp 16   Ht 4' 11" (1 499 m)   Wt 66 7 kg (147 lb)   SpO2 98%   BMI 29 69 kg/m²      Physical Exam   Constitutional: She appears well-developed and well-nourished  No distress  Cardiovascular: Normal rate and regular rhythm  Pulmonary/Chest: Effort normal and breath sounds normal    Musculoskeletal:        Right shoulder: She exhibits decreased range of motion ( significantly limitedRange of motion of the right shoulder due to pain  Positive painful arc) and crepitus  She exhibits no tenderness, no bony tenderness, no swelling, no effusion, no deformity and normal strength  Right elbow: She exhibits normal range of motion, no swelling, no effusion and no deformity  Tenderness found  Radial head ( tenderness with palpation overThe radial head  There is a associated clicking that is palpable that does cause the patient to wince with pain and reproduces the pain going up her arm with pronation and supination of the hand ) tenderness noted  No olecranon process tenderness noted           Large joint arthrocentesis  Date/Time: 4/18/2018 9:26 AM  Consent given by: patient and power of   Site marked: site marked  Supporting Documentation  Indications: pain   Procedure Details  Location: shoulder - R glenohumeral  Preparation: Patient was prepped and draped in the usual sterile fashion  Needle size: 25 G  Ultrasound guidance: no  Approach: posterior  Medications administered: 1 mL lidocaine 1 %; 1 mL methylPREDNISolone acetate 40 mg/mL    Patient tolerance: patient tolerated the procedure well with no immediate complications  Dressing:  Sterile dressing applied      No results found for this or any previous visit (from the past 1008 hour(s))  Assessment/Plan:    No problem-specific Assessment & Plan notes found for this encounter  Problem List Items Addressed This Visit     Hypertension      Well controlled  Continue on lisinopril and metoprolol  Relevant Medications    metoprolol succinate (TOPROL-XL) 25 mg 24 hr tablet    Chronic pain      Refill provided of p r n  Vicodin  Patient does use this sparingly  She does have history of spinal stenosis which does cause chronic lower back pain and antalgic gait         Relevant Medications    HYDROcodone-acetaminophen (NORCO) 5-325 mg per tablet    Primary insomnia      Refill provided of p r n  Alprazolam   Not certain why insurance is refusing this  She does not  Exceed quantity limit  It is on the [place] list for geriatric population however the patient has been on this for many years  Relevant Medications    ALPRAZolam (XANAX) 0 25 mg tablet    Acute pain of right shoulder - Primary    Relevant Orders    Large joint arthrocentesis    Arthritis of right shoulder region       Patient does have arthritis of the right glenohumeral joint  This does cause pain  Depo-Medrol injection provided in the office  Daughter is aware for therapy exercises that she can perform         Relevant Orders    Large joint arthrocentesis    Pain in joint of right elbow       Pain occurred after patient fell onto her right side  She did not have x-ray of the elbow  I would like three view x-ray of the elbow including pronation and supination views to see if the radial head dislocates with movement    I did recommend elbow support sleeve         Relevant Orders    Elbow Rom Brace    XR elbow 3+ vw right    Status post fall

## 2018-04-18 NOTE — ASSESSMENT & PLAN NOTE
PT DAILY TREATMENT NOTE - [facility] 2-15    Patient Name: Mindy Morse  Date:2017  : 1945  [x]  Patient  Verified  [place]or: [place] / Plan: [place] MEDICARE PART A & B / Product Type: Medicare /    In time: 10:34 AM  Out time: 11:29 PM  Total Treatment Time (min): 55 minutes  Total Timed Codes (min): 45 minutes  1:1 Treatment Time ( [address] only): 40   Visit #: 2     Treatment Area: Bilateral primary osteoarthritis of knee [M17.0]  Other symptoms and signs involving the musculoskeletal system [R29.898]    SUBJECTIVE  Pain Level (0-10 scale): 1.5/10  Any medication changes, allergies to medications, adverse drug reactions, diagnosis change, or new procedure performed?: [x] No    [] Yes (see summary sheet for update)  Subjective functional status/changes:   [] No changes reported  The pt denied any increased pain after his initial evaluation and has been able to perform his exercises at home without difficulty. He states that his knees have been bothering him less since starting therapy. He continues to walk 2 mi in the morning and 2 mi in the afternoon with his wife and states the knee does not bother him while walking. OBJECTIVE    Modality rationale: decrease edema, decrease inflammation and decrease pain to improve the patients ability to ambulate and perform ADLs with less pain or discomfort.    Min Type Additional Details    [] Estim: []Att   []Unatt        []TENS instruct                  []IFC  []Premod   []NMES                     []Other:  []w/US   []w/ice   []w/heat  Position:  Location:    []  Traction: [] Cervical       []Lumbar                       [] Prone          []Supine                       []Intermittent   []Continuous Lbs:  [] before manual  [] after manual  []w/heat    []  Ultrasound: []Continuous   [] Pulsed at:                           []1MHz   []3MHz Location:  W/cm2:    [] Paraffin         Location:   []w/heat   10 [x]  Ice     []  Heat  []  Ice massage Position: Supine  Location: Patient does have arthritis of the right glenohumeral joint  This does cause pain  Depo-Medrol injection provided in the office    Daughter is aware for therapy exercises that she can perform Left knee    []  Laser  []  Other: Position:  Location:      []  Vasopneumatic Device Pressure:       [] lo [] med [] hi   Temperature:      [x] Skin assessment post-treatment:  [x]intact []redness- no adverse reaction    []redness  adverse reaction:     45 min Therapeutic Exercise:  [x] See flow sheet :   Rationale: increase ROM, increase strength, improve coordination, improve balance and increase proprioception to improve the patients ability to ambulate and perform ADls with less pain or discomfort. With   [x] TE   [] TA   [] neuro   [] other: Patient Education: [x] Review HEP    [] Progressed/Changed HEP based on:   [] positioning   [] body mechanics   [] transfers   [] heat/ice application    [] other:      Other Objective/Functional Measures: None noted     Pain Level (0-10 scale) post treatment: 1/10    ASSESSMENT/Changes in Function:   The Pt tolerated the additions to his therapy program well without any increased left knee pain or discomfort. He is progressing well with his functional mobility and towards his goals. Patient will continue to benefit from skilled PT services to modify and progress therapeutic interventions, address functional mobility deficits, address ROM deficits, address strength deficits, analyze and address soft tissue restrictions, analyze and cue movement patterns, analyze and modify body mechanics/ergonomics, assess and modify postural abnormalities and instruct in home and community integration to attain remaining goals. []  See Plan of Care  []  See progress note/recertification  []  See Discharge Summary         Progress towards goals / Updated goals:  Short Term Goals: To be accomplished in 2 treatments:  1. The Pt will be independent and compliant with her HEP. Long Term Goals: To be accomplished in 5 treatments:  1. The Pt will score the MCII on his FOTO survey demonstrating improved overall function (45 to 51 points).   2. The Pt will be able to ascend and descend 1 standard flight of stairs with 0-2/10 pain to allow the Pt to be able to perform work duties with less pain.   3. The Pt will be able to perform 5 consecutive sit to stands with 0-2/10 pain to allow the Pt to transfer with less discomfort.     PLAN  [x]  Upgrade activities as tolerated     [x]  Continue plan of care  [x]  Update interventions per flow sheet       []  Discharge due to:_  []  Other:_      Gerry Ann PT 6/6/2017  10:51 AM

## 2018-04-18 NOTE — ASSESSMENT & PLAN NOTE
Refill provided of p r n  Vicodin  Patient does use this sparingly    She does have history of spinal stenosis which does cause chronic lower back pain and antalgic gait

## 2018-04-18 NOTE — ASSESSMENT & PLAN NOTE
Pain occurred after patient fell onto her right side  She did not have x-ray of the elbow  I would like three view x-ray of the elbow including pronation and supination views to see if the radial head dislocates with movement    I did recommend elbow support sleeve

## 2018-04-18 NOTE — ASSESSMENT & PLAN NOTE
Refill provided of p r n  Alprazolam   Not certain why insurance is refusing this  She does not  Exceed quantity limit  It is on the [place] list for geriatric population however the patient has been on this for many years

## 2018-04-23 DIAGNOSIS — M25.521 PAIN IN JOINT OF RIGHT ELBOW: Primary | ICD-10-CM

## 2018-05-08 DIAGNOSIS — R42 VERTIGO: ICD-10-CM

## 2018-05-09 RX ORDER — MECLIZINE HCL 12.5 MG/1
TABLET ORAL
Qty: 30 TABLET | Refills: 1 | Status: SHIPPED | OUTPATIENT
Start: 2018-05-09 | End: 2018-06-03 | Stop reason: SDUPTHER

## 2018-05-23 DIAGNOSIS — I10 ESSENTIAL HYPERTENSION: Primary | ICD-10-CM

## 2018-05-23 RX ORDER — METOPROLOL SUCCINATE 100 MG/1
TABLET, EXTENDED RELEASE ORAL
Qty: 90 TABLET | Refills: 3 | Status: SHIPPED | OUTPATIENT
Start: 2018-05-23 | End: 2019-04-01

## 2018-06-03 DIAGNOSIS — R42 VERTIGO: ICD-10-CM

## 2018-06-04 RX ORDER — MECLIZINE HCL 12.5 MG/1
TABLET ORAL
Qty: 30 TABLET | Refills: 1 | Status: SHIPPED | OUTPATIENT
Start: 2018-06-04 | End: 2019-04-01

## 2018-06-28 DIAGNOSIS — I10 ESSENTIAL HYPERTENSION: Primary | ICD-10-CM

## 2018-06-28 RX ORDER — LISINOPRIL 10 MG/1
TABLET ORAL
Qty: 90 TABLET | Refills: 3 | Status: SHIPPED | OUTPATIENT
Start: 2018-06-28 | End: 2019-04-01 | Stop reason: SDUPTHER

## 2018-07-12 DIAGNOSIS — I10 ESSENTIAL HYPERTENSION: Primary | ICD-10-CM

## 2018-07-12 RX ORDER — METOPROLOL SUCCINATE 25 MG/1
TABLET, EXTENDED RELEASE ORAL
Qty: 90 TABLET | Refills: 1 | Status: SHIPPED | OUTPATIENT
Start: 2018-07-12 | End: 2019-01-06 | Stop reason: SDUPTHER

## 2018-07-24 DIAGNOSIS — G62.9 NEUROPATHY: ICD-10-CM

## 2018-07-24 DIAGNOSIS — G25.81 RESTLESS LEG SYNDROME: Primary | ICD-10-CM

## 2018-07-24 RX ORDER — ROPINIROLE 2 MG/1
TABLET, FILM COATED ORAL
Qty: 90 TABLET | Refills: 3 | Status: SHIPPED | OUTPATIENT
Start: 2018-07-24 | End: 2019-07-14 | Stop reason: SDUPTHER

## 2018-07-24 RX ORDER — GABAPENTIN 100 MG/1
CAPSULE ORAL
Qty: 180 CAPSULE | Refills: 3 | Status: SHIPPED | OUTPATIENT
Start: 2018-07-24 | End: 2019-05-20 | Stop reason: HOSPADM

## 2018-08-05 DIAGNOSIS — I48.20 CHRONIC ATRIAL FIBRILLATION (HCC): Primary | ICD-10-CM

## 2018-08-06 RX ORDER — AMIODARONE HYDROCHLORIDE 200 MG/1
TABLET ORAL
Qty: 90 TABLET | Refills: 3 | Status: SHIPPED | OUTPATIENT
Start: 2018-08-06 | End: 2019-04-29 | Stop reason: SDUPTHER

## 2018-11-14 DIAGNOSIS — F51.01 PRIMARY INSOMNIA: ICD-10-CM

## 2018-11-14 RX ORDER — ALPRAZOLAM 0.25 MG/1
TABLET ORAL
Qty: 60 TABLET | Refills: 5 | Status: SHIPPED | OUTPATIENT
Start: 2018-11-14 | End: 2019-06-11 | Stop reason: SDUPTHER

## 2018-11-14 NOTE — TELEPHONE ENCOUNTER
Please let the patient's daughter know that she will need a follow-up appointment sometime in April  I did refill her Klonopin  Office policy    She will need to be seen

## 2018-12-02 DIAGNOSIS — G47.00 INSOMNIA, UNSPECIFIED TYPE: Primary | ICD-10-CM

## 2018-12-03 RX ORDER — TRAZODONE HYDROCHLORIDE 50 MG/1
TABLET ORAL
Qty: 90 TABLET | Refills: 3 | Status: SHIPPED | OUTPATIENT
Start: 2018-12-03 | End: 2019-11-25 | Stop reason: SDUPTHER

## 2019-01-06 DIAGNOSIS — I10 ESSENTIAL HYPERTENSION: ICD-10-CM

## 2019-01-07 RX ORDER — METOPROLOL SUCCINATE 25 MG/1
TABLET, EXTENDED RELEASE ORAL
Qty: 90 TABLET | Refills: 1 | Status: SHIPPED | OUTPATIENT
Start: 2019-01-07 | End: 2019-04-01 | Stop reason: SDUPTHER

## 2019-03-24 DIAGNOSIS — K59.03 CONSTIPATION DUE TO PAIN MEDICATION: ICD-10-CM

## 2019-03-25 RX ORDER — LINACLOTIDE 145 UG/1
CAPSULE, GELATIN COATED ORAL
Qty: 30 CAPSULE | Refills: 5 | Status: SHIPPED | OUTPATIENT
Start: 2019-03-25 | End: 2019-04-01 | Stop reason: SDUPTHER

## 2019-03-29 RX ORDER — ERYTHROMYCIN 5 MG/G
OINTMENT OPHTHALMIC
COMMUNITY
Start: 2019-03-20 | End: 2019-09-11

## 2019-04-01 ENCOUNTER — OFFICE VISIT (OUTPATIENT)
Dept: FAMILY MEDICINE CLINIC | Facility: CLINIC | Age: 84
End: 2019-04-01
Payer: MEDICARE

## 2019-04-01 VITALS
OXYGEN SATURATION: 98 % | SYSTOLIC BLOOD PRESSURE: 152 MMHG | DIASTOLIC BLOOD PRESSURE: 84 MMHG | HEIGHT: 59 IN | BODY MASS INDEX: 28.43 KG/M2 | HEART RATE: 58 BPM | RESPIRATION RATE: 16 BRPM | WEIGHT: 141 LBS

## 2019-04-01 DIAGNOSIS — G89.4 CHRONIC PAIN SYNDROME: ICD-10-CM

## 2019-04-01 DIAGNOSIS — I10 ESSENTIAL HYPERTENSION: ICD-10-CM

## 2019-04-01 DIAGNOSIS — F51.01 PRIMARY INSOMNIA: ICD-10-CM

## 2019-04-01 DIAGNOSIS — M48.062 LUMBAR STENOSIS WITH NEUROGENIC CLAUDICATION: ICD-10-CM

## 2019-04-01 DIAGNOSIS — G25.81 RESTLESS LEGS SYNDROME: ICD-10-CM

## 2019-04-01 DIAGNOSIS — I48.20 CHRONIC ATRIAL FIBRILLATION (HCC): ICD-10-CM

## 2019-04-01 DIAGNOSIS — M25.411 EFFUSION OF RIGHT SHOULDER JOINT: ICD-10-CM

## 2019-04-01 DIAGNOSIS — Z00.00 MEDICARE ANNUAL WELLNESS VISIT, SUBSEQUENT: Primary | ICD-10-CM

## 2019-04-01 DIAGNOSIS — K59.03 CONSTIPATION DUE TO PAIN MEDICATION: ICD-10-CM

## 2019-04-01 PROBLEM — N39.46 MIXED STRESS AND URGE URINARY INCONTINENCE: Status: ACTIVE | Noted: 2017-02-23

## 2019-04-01 PROBLEM — Z91.81 STATUS POST FALL: Status: RESOLVED | Noted: 2018-04-18 | Resolved: 2019-04-01

## 2019-04-01 PROCEDURE — 99214 OFFICE O/P EST MOD 30 MIN: CPT | Performed by: FAMILY MEDICINE

## 2019-04-01 PROCEDURE — G0439 PPPS, SUBSEQ VISIT: HCPCS | Performed by: FAMILY MEDICINE

## 2019-04-01 PROCEDURE — 20610 DRAIN/INJ JOINT/BURSA W/O US: CPT | Performed by: FAMILY MEDICINE

## 2019-04-01 RX ORDER — LISINOPRIL 20 MG/1
20 TABLET ORAL DAILY
Qty: 90 TABLET | Refills: 1 | Status: SHIPPED | OUTPATIENT
Start: 2019-04-01 | End: 2019-04-29 | Stop reason: SDUPTHER

## 2019-04-01 RX ORDER — METHYLPREDNISOLONE ACETATE 40 MG/ML
1 INJECTION, SUSPENSION INTRA-ARTICULAR; INTRALESIONAL; INTRAMUSCULAR; SOFT TISSUE
Status: COMPLETED | OUTPATIENT
Start: 2019-04-01 | End: 2019-04-01

## 2019-04-01 RX ORDER — LIDOCAINE HYDROCHLORIDE 10 MG/ML
1 INJECTION, SOLUTION INFILTRATION; PERINEURAL
Status: COMPLETED | OUTPATIENT
Start: 2019-04-01 | End: 2019-04-01

## 2019-04-01 RX ORDER — HYDROCODONE BITARTRATE AND ACETAMINOPHEN 5; 325 MG/1; MG/1
1 TABLET ORAL EVERY 6 HOURS
Qty: 90 TABLET | Refills: 0 | Status: SHIPPED | OUTPATIENT
Start: 2019-04-01 | End: 2019-04-29 | Stop reason: SDUPTHER

## 2019-04-01 RX ORDER — METOPROLOL SUCCINATE 25 MG/1
25 TABLET, EXTENDED RELEASE ORAL DAILY
Qty: 90 TABLET | Refills: 1 | Status: SHIPPED | OUTPATIENT
Start: 2019-04-01 | End: 2019-05-12 | Stop reason: HOSPADM

## 2019-04-01 RX ADMIN — LIDOCAINE HYDROCHLORIDE 1 ML: 10 INJECTION, SOLUTION INFILTRATION; PERINEURAL at 14:41

## 2019-04-01 RX ADMIN — METHYLPREDNISOLONE ACETATE 1 ML: 40 INJECTION, SUSPENSION INTRA-ARTICULAR; INTRALESIONAL; INTRAMUSCULAR; SOFT TISSUE at 14:41

## 2019-04-02 LAB
APPEARANCE SNV: ABNORMAL
BASOPHILS NFR SNV MANUAL: 0 %
COLOR SNV: ABNORMAL
CRYSTALS SNV MICRO: ABNORMAL /HPF
EOSINOPHIL NFR SNV MANUAL: 0 % (ref 0–2)
LYMPHOCYTES NFR SNV MANUAL: 67 % (ref 0–74)
MONOS+MACROS NFR SNV MANUAL: 22 % (ref 0–69)
MUCIN CLOT SNV QL: ABNORMAL
NEUTROPHILS NFR SNV MANUAL: 11 % (ref 0–24)
SPECIMEN SOURCE: ABNORMAL
SYNOVIOCYTES NFR SNV: 0 % (ref 0–15)
WBC # SNV MANUAL: 560 CELLS/UL

## 2019-04-29 ENCOUNTER — OFFICE VISIT (OUTPATIENT)
Dept: FAMILY MEDICINE CLINIC | Facility: CLINIC | Age: 84
End: 2019-04-29
Payer: MEDICARE

## 2019-04-29 VITALS
HEART RATE: 64 BPM | BODY MASS INDEX: 28.02 KG/M2 | SYSTOLIC BLOOD PRESSURE: 164 MMHG | HEIGHT: 59 IN | RESPIRATION RATE: 16 BRPM | WEIGHT: 139 LBS | OXYGEN SATURATION: 98 % | DIASTOLIC BLOOD PRESSURE: 98 MMHG

## 2019-04-29 DIAGNOSIS — G89.4 CHRONIC PAIN SYNDROME: ICD-10-CM

## 2019-04-29 DIAGNOSIS — I48.20 CHRONIC ATRIAL FIBRILLATION (HCC): Primary | ICD-10-CM

## 2019-04-29 DIAGNOSIS — I10 ESSENTIAL HYPERTENSION: ICD-10-CM

## 2019-04-29 PROBLEM — I48.0 PAROXYSMAL ATRIAL FIBRILLATION (HCC): Chronic | Status: RESOLVED | Noted: 2017-12-12 | Resolved: 2019-04-29

## 2019-04-29 PROCEDURE — 99214 OFFICE O/P EST MOD 30 MIN: CPT | Performed by: FAMILY MEDICINE

## 2019-04-29 RX ORDER — AMIODARONE HYDROCHLORIDE 200 MG/1
100 TABLET ORAL DAILY
Qty: 90 TABLET | Refills: 3 | Status: SHIPPED | OUTPATIENT
Start: 2019-04-29 | End: 2020-04-29

## 2019-04-29 RX ORDER — LISINOPRIL 20 MG/1
20 TABLET ORAL 2 TIMES DAILY
Qty: 90 TABLET | Refills: 1 | Status: SHIPPED | OUTPATIENT
Start: 2019-04-29 | End: 2019-05-12 | Stop reason: HOSPADM

## 2019-04-29 RX ORDER — HYDROCODONE BITARTRATE AND ACETAMINOPHEN 5; 325 MG/1; MG/1
1 TABLET ORAL EVERY 6 HOURS
Qty: 28 TABLET | Refills: 0 | Status: ON HOLD | OUTPATIENT
Start: 2019-04-29 | End: 2019-05-12 | Stop reason: SDUPTHER

## 2019-05-03 ENCOUNTER — APPOINTMENT (EMERGENCY)
Dept: RADIOLOGY | Facility: HOSPITAL | Age: 84
DRG: 305 | End: 2019-05-03
Payer: MEDICARE

## 2019-05-03 ENCOUNTER — HOSPITAL ENCOUNTER (INPATIENT)
Facility: HOSPITAL | Age: 84
LOS: 8 days | Discharge: HOME HEALTH | DRG: 305 | End: 2019-05-12
Attending: EMERGENCY MEDICINE | Admitting: HOSPITALIST
Payer: MEDICARE

## 2019-05-03 ENCOUNTER — APPOINTMENT (EMERGENCY)
Dept: CT IMAGING | Facility: HOSPITAL | Age: 84
DRG: 305 | End: 2019-05-03
Payer: MEDICARE

## 2019-05-03 DIAGNOSIS — K59.03 CONSTIPATION DUE TO PAIN MEDICATION: Chronic | ICD-10-CM

## 2019-05-03 DIAGNOSIS — R07.9 CHEST PAIN: Chronic | ICD-10-CM

## 2019-05-03 DIAGNOSIS — R07.9 CHEST PAIN, UNSPECIFIED TYPE: ICD-10-CM

## 2019-05-03 DIAGNOSIS — I48.20 CHRONIC ATRIAL FIBRILLATION (HCC): ICD-10-CM

## 2019-05-03 DIAGNOSIS — I16.0 HYPERTENSIVE URGENCY: Primary | ICD-10-CM

## 2019-05-03 DIAGNOSIS — I10 ESSENTIAL HYPERTENSION: ICD-10-CM

## 2019-05-03 DIAGNOSIS — G89.4 CHRONIC PAIN SYNDROME: ICD-10-CM

## 2019-05-03 DIAGNOSIS — E87.1 HYPONATREMIA: ICD-10-CM

## 2019-05-03 PROBLEM — I48.0 PAROXYSMAL ATRIAL FIBRILLATION (HCC): Status: ACTIVE | Noted: 2019-04-01

## 2019-05-03 PROBLEM — R07.2 PRECORDIAL PAIN: Status: ACTIVE | Noted: 2019-05-03

## 2019-05-03 LAB
ALBUMIN SERPL BCP-MCNC: 3.1 G/DL (ref 3.5–5)
ALP SERPL-CCNC: 91 U/L (ref 46–116)
ALT SERPL W P-5'-P-CCNC: 32 U/L (ref 12–78)
ANION GAP SERPL CALCULATED.3IONS-SCNC: 9 MMOL/L (ref 4–13)
AST SERPL W P-5'-P-CCNC: 28 U/L (ref 5–45)
ATRIAL RATE: 65 BPM
BASOPHILS # BLD AUTO: 0.03 THOUSANDS/ΜL (ref 0–0.1)
BASOPHILS NFR BLD AUTO: 1 % (ref 0–1)
BILIRUB SERPL-MCNC: 0.3 MG/DL (ref 0.2–1)
BUN SERPL-MCNC: 11 MG/DL (ref 5–25)
CALCIUM SERPL-MCNC: 8.5 MG/DL (ref 8.3–10.1)
CHLORIDE SERPL-SCNC: 99 MMOL/L (ref 100–108)
CO2 SERPL-SCNC: 26 MMOL/L (ref 21–32)
CREAT SERPL-MCNC: 0.95 MG/DL (ref 0.6–1.3)
EOSINOPHIL # BLD AUTO: 0.01 THOUSAND/ΜL (ref 0–0.61)
EOSINOPHIL NFR BLD AUTO: 0 % (ref 0–6)
ERYTHROCYTE [DISTWIDTH] IN BLOOD BY AUTOMATED COUNT: 14.6 % (ref 11.6–15.1)
GFR SERPL CREATININE-BSD FRML MDRD: 52 ML/MIN/1.73SQ M
GLUCOSE SERPL-MCNC: 107 MG/DL (ref 65–140)
HCT VFR BLD AUTO: 37.8 % (ref 34.8–46.1)
HGB BLD-MCNC: 12.6 G/DL (ref 11.5–15.4)
IMM GRANULOCYTES # BLD AUTO: 0.01 THOUSAND/UL (ref 0–0.2)
IMM GRANULOCYTES NFR BLD AUTO: 0 % (ref 0–2)
LYMPHOCYTES # BLD AUTO: 1.94 THOUSANDS/ΜL (ref 0.6–4.47)
LYMPHOCYTES NFR BLD AUTO: 35 % (ref 14–44)
MCH RBC QN AUTO: 31.6 PG (ref 26.8–34.3)
MCHC RBC AUTO-ENTMCNC: 33.3 G/DL (ref 31.4–37.4)
MCV RBC AUTO: 95 FL (ref 82–98)
MONOCYTES # BLD AUTO: 0.37 THOUSAND/ΜL (ref 0.17–1.22)
MONOCYTES NFR BLD AUTO: 7 % (ref 4–12)
NEUTROPHILS # BLD AUTO: 3.24 THOUSANDS/ΜL (ref 1.85–7.62)
NEUTS SEG NFR BLD AUTO: 57 % (ref 43–75)
NRBC BLD AUTO-RTO: 0 /100 WBCS
P AXIS: 104 DEGREES
PLATELET # BLD AUTO: 213 THOUSANDS/UL (ref 149–390)
PLATELET # BLD AUTO: 220 THOUSANDS/UL (ref 149–390)
PMV BLD AUTO: 8.6 FL (ref 8.9–12.7)
PMV BLD AUTO: 9 FL (ref 8.9–12.7)
POTASSIUM SERPL-SCNC: 4.3 MMOL/L (ref 3.5–5.3)
PR INTERVAL: 360 MS
PROT SERPL-MCNC: 6.7 G/DL (ref 6.4–8.2)
QRS AXIS: -55 DEGREES
QRSD INTERVAL: 96 MS
QT INTERVAL: 392 MS
QTC INTERVAL: 407 MS
RBC # BLD AUTO: 3.99 MILLION/UL (ref 3.81–5.12)
SODIUM SERPL-SCNC: 134 MMOL/L (ref 136–145)
T WAVE AXIS: 74 DEGREES
TROPONIN I SERPL-MCNC: <0.02 NG/ML
VENTRICULAR RATE: 65 BPM
WBC # BLD AUTO: 5.6 THOUSAND/UL (ref 4.31–10.16)

## 2019-05-03 PROCEDURE — 93005 ELECTROCARDIOGRAM TRACING: CPT

## 2019-05-03 PROCEDURE — 84484 ASSAY OF TROPONIN QUANT: CPT | Performed by: EMERGENCY MEDICINE

## 2019-05-03 PROCEDURE — 70450 CT HEAD/BRAIN W/O DYE: CPT

## 2019-05-03 PROCEDURE — 85025 COMPLETE CBC W/AUTO DIFF WBC: CPT | Performed by: EMERGENCY MEDICINE

## 2019-05-03 PROCEDURE — 84484 ASSAY OF TROPONIN QUANT: CPT | Performed by: INTERNAL MEDICINE

## 2019-05-03 PROCEDURE — 99285 EMERGENCY DEPT VISIT HI MDM: CPT | Performed by: EMERGENCY MEDICINE

## 2019-05-03 PROCEDURE — 80053 COMPREHEN METABOLIC PANEL: CPT | Performed by: EMERGENCY MEDICINE

## 2019-05-03 PROCEDURE — 36415 COLL VENOUS BLD VENIPUNCTURE: CPT | Performed by: EMERGENCY MEDICINE

## 2019-05-03 PROCEDURE — 99220 PR INITIAL OBSERVATION CARE/DAY 70 MINUTES: CPT | Performed by: INTERNAL MEDICINE

## 2019-05-03 PROCEDURE — 85049 AUTOMATED PLATELET COUNT: CPT | Performed by: INTERNAL MEDICINE

## 2019-05-03 PROCEDURE — 93010 ELECTROCARDIOGRAM REPORT: CPT | Performed by: INTERNAL MEDICINE

## 2019-05-03 PROCEDURE — 71045 X-RAY EXAM CHEST 1 VIEW: CPT

## 2019-05-03 PROCEDURE — 99285 EMERGENCY DEPT VISIT HI MDM: CPT

## 2019-05-03 RX ORDER — LISINOPRIL 10 MG/1
20 TABLET ORAL ONCE
Status: COMPLETED | OUTPATIENT
Start: 2019-05-03 | End: 2019-05-03

## 2019-05-03 RX ORDER — HYDROCODONE BITARTRATE AND ACETAMINOPHEN 5; 325 MG/1; MG/1
1 TABLET ORAL EVERY 6 HOURS
Status: DISCONTINUED | OUTPATIENT
Start: 2019-05-03 | End: 2019-05-09

## 2019-05-03 RX ORDER — GABAPENTIN 100 MG/1
100 CAPSULE ORAL 2 TIMES DAILY
Status: DISCONTINUED | OUTPATIENT
Start: 2019-05-03 | End: 2019-05-12 | Stop reason: HOSPADM

## 2019-05-03 RX ORDER — ASPIRIN 81 MG/1
162 TABLET, CHEWABLE ORAL ONCE
Qty: 20 TABLET | Refills: 0 | Status: SHIPPED | OUTPATIENT
Start: 2019-05-03 | End: 2019-05-03 | Stop reason: DRUGHIGH

## 2019-05-03 RX ORDER — TRAZODONE HYDROCHLORIDE 50 MG/1
50 TABLET ORAL
Status: DISCONTINUED | OUTPATIENT
Start: 2019-05-03 | End: 2019-05-12 | Stop reason: HOSPADM

## 2019-05-03 RX ORDER — AMIODARONE HYDROCHLORIDE 200 MG/1
100 TABLET ORAL DAILY
Status: DISCONTINUED | OUTPATIENT
Start: 2019-05-04 | End: 2019-05-12 | Stop reason: HOSPADM

## 2019-05-03 RX ORDER — ASPIRIN 81 MG/1
162 TABLET, CHEWABLE ORAL ONCE
Status: DISCONTINUED | OUTPATIENT
Start: 2019-05-03 | End: 2019-05-06

## 2019-05-03 RX ORDER — METOPROLOL SUCCINATE 25 MG/1
25 TABLET, EXTENDED RELEASE ORAL DAILY
Status: DISCONTINUED | OUTPATIENT
Start: 2019-05-04 | End: 2019-05-05

## 2019-05-03 RX ORDER — ONDANSETRON 2 MG/ML
4 INJECTION INTRAMUSCULAR; INTRAVENOUS EVERY 6 HOURS PRN
Status: DISCONTINUED | OUTPATIENT
Start: 2019-05-03 | End: 2019-05-12 | Stop reason: HOSPADM

## 2019-05-03 RX ORDER — DOCUSATE SODIUM 100 MG/1
100 CAPSULE, LIQUID FILLED ORAL 2 TIMES DAILY PRN
Status: DISCONTINUED | OUTPATIENT
Start: 2019-05-03 | End: 2019-05-05

## 2019-05-03 RX ORDER — ACETAMINOPHEN 325 MG/1
650 TABLET ORAL EVERY 6 HOURS PRN
Status: DISCONTINUED | OUTPATIENT
Start: 2019-05-03 | End: 2019-05-12 | Stop reason: HOSPADM

## 2019-05-03 RX ORDER — CHLORTHALIDONE 25 MG/1
25 TABLET ORAL DAILY
Status: DISCONTINUED | OUTPATIENT
Start: 2019-05-03 | End: 2019-05-04

## 2019-05-03 RX ORDER — AMLODIPINE BESYLATE 5 MG/1
10 TABLET ORAL DAILY
Status: DISCONTINUED | OUTPATIENT
Start: 2019-05-03 | End: 2019-05-06

## 2019-05-03 RX ORDER — ACETAMINOPHEN 325 MG/1
650 TABLET ORAL ONCE
Status: COMPLETED | OUTPATIENT
Start: 2019-05-03 | End: 2019-05-03

## 2019-05-03 RX ORDER — FAMOTIDINE 20 MG/1
20 TABLET, FILM COATED ORAL DAILY
Status: DISCONTINUED | OUTPATIENT
Start: 2019-05-04 | End: 2019-05-12 | Stop reason: HOSPADM

## 2019-05-03 RX ORDER — HEPARIN SODIUM 5000 [USP'U]/ML
5000 INJECTION, SOLUTION INTRAVENOUS; SUBCUTANEOUS EVERY 8 HOURS SCHEDULED
Status: DISCONTINUED | OUTPATIENT
Start: 2019-05-03 | End: 2019-05-12 | Stop reason: HOSPADM

## 2019-05-03 RX ORDER — LISINOPRIL 20 MG/1
20 TABLET ORAL 2 TIMES DAILY
Status: DISCONTINUED | OUTPATIENT
Start: 2019-05-03 | End: 2019-05-07

## 2019-05-03 RX ORDER — DORZOLAMIDE HYDROCHLORIDE AND TIMOLOL MALEATE 20; 5 MG/ML; MG/ML
1 SOLUTION/ DROPS OPHTHALMIC 2 TIMES DAILY
Status: DISCONTINUED | OUTPATIENT
Start: 2019-05-03 | End: 2019-05-12 | Stop reason: HOSPADM

## 2019-05-03 RX ORDER — CALCIUM CARBONATE 200(500)MG
1000 TABLET,CHEWABLE ORAL DAILY PRN
Status: DISCONTINUED | OUTPATIENT
Start: 2019-05-03 | End: 2019-05-12 | Stop reason: HOSPADM

## 2019-05-03 RX ORDER — ONDANSETRON 4 MG/1
8 TABLET, ORALLY DISINTEGRATING ORAL ONCE
Status: COMPLETED | OUTPATIENT
Start: 2019-05-03 | End: 2019-05-03

## 2019-05-03 RX ORDER — FAMOTIDINE 20 MG/1
20 TABLET, FILM COATED ORAL
Status: DISCONTINUED | OUTPATIENT
Start: 2019-05-03 | End: 2019-05-03

## 2019-05-03 RX ORDER — ASPIRIN 81 MG/1
81 TABLET, CHEWABLE ORAL DAILY
Status: DISCONTINUED | OUTPATIENT
Start: 2019-05-04 | End: 2019-05-12 | Stop reason: HOSPADM

## 2019-05-03 RX ORDER — ALPRAZOLAM 0.25 MG/1
0.25 TABLET ORAL
Status: DISCONTINUED | OUTPATIENT
Start: 2019-05-03 | End: 2019-05-12 | Stop reason: HOSPADM

## 2019-05-03 RX ORDER — ROPINIROLE 1 MG/1
2 TABLET, FILM COATED ORAL
Status: DISCONTINUED | OUTPATIENT
Start: 2019-05-03 | End: 2019-05-12 | Stop reason: HOSPADM

## 2019-05-03 RX ADMIN — ONDANSETRON 8 MG: 4 TABLET, ORALLY DISINTEGRATING ORAL at 11:18

## 2019-05-03 RX ADMIN — HYDROCODONE BITARTRATE AND ACETAMINOPHEN 1 TABLET: 5; 325 TABLET ORAL at 17:19

## 2019-05-03 RX ADMIN — LISINOPRIL 20 MG: 5 TABLET ORAL at 17:18

## 2019-05-03 RX ADMIN — HYDROCODONE BITARTRATE AND ACETAMINOPHEN 1 TABLET: 5; 325 TABLET ORAL at 21:53

## 2019-05-03 RX ADMIN — LISINOPRIL 20 MG: 10 TABLET ORAL at 11:17

## 2019-05-03 RX ADMIN — HEPARIN SODIUM 5000 UNITS: 5000 INJECTION INTRAVENOUS; SUBCUTANEOUS at 17:18

## 2019-05-03 RX ADMIN — FAMOTIDINE 20 MG: 20 TABLET ORAL at 17:19

## 2019-05-03 RX ADMIN — ROPINIROLE HYDROCHLORIDE 2 MG: 1 TABLET, FILM COATED ORAL at 21:53

## 2019-05-03 RX ADMIN — AMLODIPINE BESYLATE 10 MG: 10 TABLET ORAL at 16:12

## 2019-05-03 RX ADMIN — TRAZODONE HYDROCHLORIDE 50 MG: 50 TABLET ORAL at 21:53

## 2019-05-03 RX ADMIN — HEPARIN SODIUM 5000 UNITS: 5000 INJECTION INTRAVENOUS; SUBCUTANEOUS at 21:53

## 2019-05-03 RX ADMIN — CHLORTHALIDONE 25 MG: 25 TABLET ORAL at 11:50

## 2019-05-03 RX ADMIN — GABAPENTIN 100 MG: 100 CAPSULE ORAL at 17:18

## 2019-05-03 RX ADMIN — ACETAMINOPHEN 650 MG: 325 TABLET, FILM COATED ORAL at 11:17

## 2019-05-04 LAB
ANION GAP SERPL CALCULATED.3IONS-SCNC: 10 MMOL/L (ref 4–13)
BUN SERPL-MCNC: 13 MG/DL (ref 5–25)
CALCIUM SERPL-MCNC: 8.7 MG/DL (ref 8.3–10.1)
CHLORIDE SERPL-SCNC: 96 MMOL/L (ref 100–108)
CO2 SERPL-SCNC: 24 MMOL/L (ref 21–32)
CREAT SERPL-MCNC: 0.91 MG/DL (ref 0.6–1.3)
ERYTHROCYTE [DISTWIDTH] IN BLOOD BY AUTOMATED COUNT: 14.6 % (ref 11.6–15.1)
GFR SERPL CREATININE-BSD FRML MDRD: 55 ML/MIN/1.73SQ M
GLUCOSE P FAST SERPL-MCNC: 87 MG/DL (ref 65–99)
GLUCOSE SERPL-MCNC: 87 MG/DL (ref 65–140)
HCT VFR BLD AUTO: 35.7 % (ref 34.8–46.1)
HGB BLD-MCNC: 12.1 G/DL (ref 11.5–15.4)
MAGNESIUM SERPL-MCNC: 2 MG/DL (ref 1.6–2.6)
MCH RBC QN AUTO: 31.9 PG (ref 26.8–34.3)
MCHC RBC AUTO-ENTMCNC: 33.9 G/DL (ref 31.4–37.4)
MCV RBC AUTO: 94 FL (ref 82–98)
PLATELET # BLD AUTO: 232 THOUSANDS/UL (ref 149–390)
PMV BLD AUTO: 9.1 FL (ref 8.9–12.7)
POTASSIUM SERPL-SCNC: 4.2 MMOL/L (ref 3.5–5.3)
RBC # BLD AUTO: 3.79 MILLION/UL (ref 3.81–5.12)
SODIUM SERPL-SCNC: 130 MMOL/L (ref 136–145)
WBC # BLD AUTO: 5.57 THOUSAND/UL (ref 4.31–10.16)

## 2019-05-04 PROCEDURE — 99222 1ST HOSP IP/OBS MODERATE 55: CPT | Performed by: INTERNAL MEDICINE

## 2019-05-04 PROCEDURE — 85027 COMPLETE CBC AUTOMATED: CPT | Performed by: INTERNAL MEDICINE

## 2019-05-04 PROCEDURE — 83735 ASSAY OF MAGNESIUM: CPT | Performed by: INTERNAL MEDICINE

## 2019-05-04 PROCEDURE — 80048 BASIC METABOLIC PNL TOTAL CA: CPT | Performed by: INTERNAL MEDICINE

## 2019-05-04 PROCEDURE — 99232 SBSQ HOSP IP/OBS MODERATE 35: CPT | Performed by: INTERNAL MEDICINE

## 2019-05-04 RX ORDER — METOPROLOL TARTRATE 5 MG/5ML
2.5 INJECTION INTRAVENOUS EVERY 6 HOURS PRN
Status: DISCONTINUED | OUTPATIENT
Start: 2019-05-04 | End: 2019-05-06

## 2019-05-04 RX ADMIN — TRAZODONE HYDROCHLORIDE 50 MG: 50 TABLET ORAL at 21:14

## 2019-05-04 RX ADMIN — AMIODARONE HYDROCHLORIDE 100 MG: 200 TABLET ORAL at 08:26

## 2019-05-04 RX ADMIN — HYDROCODONE BITARTRATE AND ACETAMINOPHEN 1 TABLET: 5; 325 TABLET ORAL at 17:58

## 2019-05-04 RX ADMIN — FAMOTIDINE 20 MG: 20 TABLET ORAL at 08:25

## 2019-05-04 RX ADMIN — HYDROCODONE BITARTRATE AND ACETAMINOPHEN 1 TABLET: 5; 325 TABLET ORAL at 21:14

## 2019-05-04 RX ADMIN — ONDANSETRON 4 MG: 2 INJECTION INTRAMUSCULAR; INTRAVENOUS at 17:57

## 2019-05-04 RX ADMIN — AMLODIPINE BESYLATE 10 MG: 10 TABLET ORAL at 08:26

## 2019-05-04 RX ADMIN — HYDROCODONE BITARTRATE AND ACETAMINOPHEN 1 TABLET: 5; 325 TABLET ORAL at 11:15

## 2019-05-04 RX ADMIN — LISINOPRIL 20 MG: 5 TABLET ORAL at 08:25

## 2019-05-04 RX ADMIN — METOPROLOL SUCCINATE 25 MG: 25 TABLET, EXTENDED RELEASE ORAL at 08:26

## 2019-05-04 RX ADMIN — LISINOPRIL 20 MG: 5 TABLET ORAL at 17:58

## 2019-05-04 RX ADMIN — GABAPENTIN 100 MG: 100 CAPSULE ORAL at 17:58

## 2019-05-04 RX ADMIN — ONDANSETRON 4 MG: 2 INJECTION INTRAMUSCULAR; INTRAVENOUS at 13:56

## 2019-05-04 RX ADMIN — ASPIRIN 81 MG 81 MG: 81 TABLET ORAL at 08:26

## 2019-05-04 RX ADMIN — HYDROCODONE BITARTRATE AND ACETAMINOPHEN 1 TABLET: 5; 325 TABLET ORAL at 05:33

## 2019-05-04 RX ADMIN — HEPARIN SODIUM 5000 UNITS: 5000 INJECTION INTRAVENOUS; SUBCUTANEOUS at 21:14

## 2019-05-04 RX ADMIN — CHLORTHALIDONE 25 MG: 25 TABLET ORAL at 08:26

## 2019-05-04 RX ADMIN — GABAPENTIN 100 MG: 100 CAPSULE ORAL at 08:26

## 2019-05-04 RX ADMIN — ROPINIROLE HYDROCHLORIDE 2 MG: 1 TABLET, FILM COATED ORAL at 21:14

## 2019-05-04 RX ADMIN — DORZOLAMIDE HYDROCHLORIDE AND TIMOLOL MALEATE 1 DROP: 20; 5 SOLUTION/ DROPS OPHTHALMIC at 08:26

## 2019-05-04 RX ADMIN — METOPROLOL TARTRATE 2.5 MG: 5 INJECTION, SOLUTION INTRAVENOUS at 06:03

## 2019-05-04 RX ADMIN — HEPARIN SODIUM 5000 UNITS: 5000 INJECTION INTRAVENOUS; SUBCUTANEOUS at 05:33

## 2019-05-04 RX ADMIN — HEPARIN SODIUM 5000 UNITS: 5000 INJECTION INTRAVENOUS; SUBCUTANEOUS at 13:53

## 2019-05-05 ENCOUNTER — APPOINTMENT (INPATIENT)
Dept: RADIOLOGY | Facility: HOSPITAL | Age: 84
DRG: 305 | End: 2019-05-05
Payer: MEDICARE

## 2019-05-05 PROBLEM — F11.20 CONTINUOUS OPIOID DEPENDENCE (HCC): Status: ACTIVE | Noted: 2019-05-05

## 2019-05-05 LAB
ANION GAP SERPL CALCULATED.3IONS-SCNC: 10 MMOL/L (ref 4–13)
ANION GAP SERPL CALCULATED.3IONS-SCNC: 8 MMOL/L (ref 4–13)
ANION GAP SERPL CALCULATED.3IONS-SCNC: 8 MMOL/L (ref 4–13)
BUN SERPL-MCNC: 11 MG/DL (ref 5–25)
BUN SERPL-MCNC: 11 MG/DL (ref 5–25)
BUN SERPL-MCNC: 12 MG/DL (ref 5–25)
CALCIUM SERPL-MCNC: 8.6 MG/DL (ref 8.3–10.1)
CALCIUM SERPL-MCNC: 8.7 MG/DL (ref 8.3–10.1)
CALCIUM SERPL-MCNC: 9 MG/DL (ref 8.3–10.1)
CHLORIDE SERPL-SCNC: 83 MMOL/L (ref 100–108)
CHLORIDE SERPL-SCNC: 87 MMOL/L (ref 100–108)
CHLORIDE SERPL-SCNC: 90 MMOL/L (ref 100–108)
CO2 SERPL-SCNC: 24 MMOL/L (ref 21–32)
CO2 SERPL-SCNC: 27 MMOL/L (ref 21–32)
CO2 SERPL-SCNC: 28 MMOL/L (ref 21–32)
CREAT SERPL-MCNC: 0.97 MG/DL (ref 0.6–1.3)
CREAT SERPL-MCNC: 0.98 MG/DL (ref 0.6–1.3)
CREAT SERPL-MCNC: 1.02 MG/DL (ref 0.6–1.3)
ERYTHROCYTE [DISTWIDTH] IN BLOOD BY AUTOMATED COUNT: 13.9 % (ref 11.6–15.1)
GFR SERPL CREATININE-BSD FRML MDRD: 48 ML/MIN/1.73SQ M
GFR SERPL CREATININE-BSD FRML MDRD: 50 ML/MIN/1.73SQ M
GFR SERPL CREATININE-BSD FRML MDRD: 51 ML/MIN/1.73SQ M
GLUCOSE SERPL-MCNC: 108 MG/DL (ref 65–140)
GLUCOSE SERPL-MCNC: 135 MG/DL (ref 65–140)
GLUCOSE SERPL-MCNC: 93 MG/DL (ref 65–140)
HCT VFR BLD AUTO: 34.8 % (ref 34.8–46.1)
HGB BLD-MCNC: 12.4 G/DL (ref 11.5–15.4)
MCH RBC QN AUTO: 32.7 PG (ref 26.8–34.3)
MCHC RBC AUTO-ENTMCNC: 35.6 G/DL (ref 31.4–37.4)
MCV RBC AUTO: 92 FL (ref 82–98)
OSMOLALITY UR: 528 MMOL/KG
PLATELET # BLD AUTO: 227 THOUSANDS/UL (ref 149–390)
PMV BLD AUTO: 9 FL (ref 8.9–12.7)
POTASSIUM SERPL-SCNC: 3.7 MMOL/L (ref 3.5–5.3)
POTASSIUM SERPL-SCNC: 4 MMOL/L (ref 3.5–5.3)
POTASSIUM SERPL-SCNC: 4 MMOL/L (ref 3.5–5.3)
RBC # BLD AUTO: 3.79 MILLION/UL (ref 3.81–5.12)
SODIUM SERPL-SCNC: 118 MMOL/L (ref 136–145)
SODIUM SERPL-SCNC: 123 MMOL/L (ref 136–145)
SODIUM SERPL-SCNC: 124 MMOL/L (ref 136–145)
WBC # BLD AUTO: 5.24 THOUSAND/UL (ref 4.31–10.16)

## 2019-05-05 PROCEDURE — 80048 BASIC METABOLIC PNL TOTAL CA: CPT | Performed by: PHYSICIAN ASSISTANT

## 2019-05-05 PROCEDURE — 85027 COMPLETE CBC AUTOMATED: CPT | Performed by: INTERNAL MEDICINE

## 2019-05-05 PROCEDURE — 99222 1ST HOSP IP/OBS MODERATE 55: CPT | Performed by: INTERNAL MEDICINE

## 2019-05-05 PROCEDURE — 80048 BASIC METABOLIC PNL TOTAL CA: CPT | Performed by: INTERNAL MEDICINE

## 2019-05-05 PROCEDURE — 83935 ASSAY OF URINE OSMOLALITY: CPT | Performed by: PHYSICIAN ASSISTANT

## 2019-05-05 PROCEDURE — 74022 RADEX COMPL AQT ABD SERIES: CPT

## 2019-05-05 PROCEDURE — 99232 SBSQ HOSP IP/OBS MODERATE 35: CPT | Performed by: INTERNAL MEDICINE

## 2019-05-05 PROCEDURE — 99232 SBSQ HOSP IP/OBS MODERATE 35: CPT | Performed by: PHYSICIAN ASSISTANT

## 2019-05-05 RX ORDER — LABETALOL 100 MG/1
100 TABLET, FILM COATED ORAL EVERY 12 HOURS SCHEDULED
Status: DISCONTINUED | OUTPATIENT
Start: 2019-05-06 | End: 2019-05-06

## 2019-05-05 RX ORDER — DOCUSATE SODIUM 100 MG/1
100 CAPSULE, LIQUID FILLED ORAL 2 TIMES DAILY
Status: DISCONTINUED | OUTPATIENT
Start: 2019-05-05 | End: 2019-05-06

## 2019-05-05 RX ORDER — SODIUM CHLORIDE 9 MG/ML
70 INJECTION, SOLUTION INTRAVENOUS CONTINUOUS
Status: DISCONTINUED | OUTPATIENT
Start: 2019-05-05 | End: 2019-05-05

## 2019-05-05 RX ORDER — FUROSEMIDE 10 MG/ML
40 INJECTION INTRAMUSCULAR; INTRAVENOUS ONCE
Status: COMPLETED | OUTPATIENT
Start: 2019-05-05 | End: 2019-05-05

## 2019-05-05 RX ORDER — SENNOSIDES 8.6 MG
1 TABLET ORAL 2 TIMES DAILY PRN
Status: DISCONTINUED | OUTPATIENT
Start: 2019-05-05 | End: 2019-05-06

## 2019-05-05 RX ORDER — PANTOPRAZOLE SODIUM 40 MG/1
40 TABLET, DELAYED RELEASE ORAL
Status: DISCONTINUED | OUTPATIENT
Start: 2019-05-05 | End: 2019-05-06

## 2019-05-05 RX ADMIN — AMIODARONE HYDROCHLORIDE 100 MG: 200 TABLET ORAL at 09:23

## 2019-05-05 RX ADMIN — METOPROLOL TARTRATE 2.5 MG: 5 INJECTION, SOLUTION INTRAVENOUS at 09:36

## 2019-05-05 RX ADMIN — SODIUM CHLORIDE 70 ML/HR: 0.9 INJECTION, SOLUTION INTRAVENOUS at 13:36

## 2019-05-05 RX ADMIN — GABAPENTIN 100 MG: 100 CAPSULE ORAL at 09:22

## 2019-05-05 RX ADMIN — TRAZODONE HYDROCHLORIDE 50 MG: 50 TABLET ORAL at 21:21

## 2019-05-05 RX ADMIN — AMLODIPINE BESYLATE 10 MG: 10 TABLET ORAL at 09:23

## 2019-05-05 RX ADMIN — ALPRAZOLAM 0.25 MG: 0.25 TABLET ORAL at 18:57

## 2019-05-05 RX ADMIN — FAMOTIDINE 20 MG: 20 TABLET ORAL at 09:23

## 2019-05-05 RX ADMIN — ONDANSETRON 4 MG: 2 INJECTION INTRAMUSCULAR; INTRAVENOUS at 11:42

## 2019-05-05 RX ADMIN — ASPIRIN 81 MG 81 MG: 81 TABLET ORAL at 09:23

## 2019-05-05 RX ADMIN — ONDANSETRON 4 MG: 2 INJECTION INTRAMUSCULAR; INTRAVENOUS at 17:37

## 2019-05-05 RX ADMIN — HYDROCODONE BITARTRATE AND ACETAMINOPHEN 1 TABLET: 5; 325 TABLET ORAL at 15:58

## 2019-05-05 RX ADMIN — DOCUSATE SODIUM 100 MG: 100 CAPSULE, LIQUID FILLED ORAL at 17:47

## 2019-05-05 RX ADMIN — DORZOLAMIDE HYDROCHLORIDE AND TIMOLOL MALEATE 1 DROP: 20; 5 SOLUTION/ DROPS OPHTHALMIC at 17:48

## 2019-05-05 RX ADMIN — ROPINIROLE HYDROCHLORIDE 2 MG: 1 TABLET, FILM COATED ORAL at 21:20

## 2019-05-05 RX ADMIN — LISINOPRIL 20 MG: 5 TABLET ORAL at 17:45

## 2019-05-05 RX ADMIN — HYDROCODONE BITARTRATE AND ACETAMINOPHEN 1 TABLET: 5; 325 TABLET ORAL at 21:20

## 2019-05-05 RX ADMIN — ONDANSETRON 4 MG: 2 INJECTION INTRAMUSCULAR; INTRAVENOUS at 04:58

## 2019-05-05 RX ADMIN — DORZOLAMIDE HYDROCHLORIDE AND TIMOLOL MALEATE 1 DROP: 20; 5 SOLUTION/ DROPS OPHTHALMIC at 09:14

## 2019-05-05 RX ADMIN — HEPARIN SODIUM 5000 UNITS: 5000 INJECTION INTRAVENOUS; SUBCUTANEOUS at 14:49

## 2019-05-05 RX ADMIN — GABAPENTIN 100 MG: 100 CAPSULE ORAL at 17:46

## 2019-05-05 RX ADMIN — LISINOPRIL 20 MG: 5 TABLET ORAL at 09:22

## 2019-05-05 RX ADMIN — HEPARIN SODIUM 5000 UNITS: 5000 INJECTION INTRAVENOUS; SUBCUTANEOUS at 05:01

## 2019-05-05 RX ADMIN — HEPARIN SODIUM 5000 UNITS: 5000 INJECTION INTRAVENOUS; SUBCUTANEOUS at 21:19

## 2019-05-05 RX ADMIN — FUROSEMIDE 40 MG: 10 INJECTION, SOLUTION INTRAMUSCULAR; INTRAVENOUS at 20:17

## 2019-05-05 RX ADMIN — HYDROCODONE BITARTRATE AND ACETAMINOPHEN 1 TABLET: 5; 325 TABLET ORAL at 04:56

## 2019-05-05 RX ADMIN — METOPROLOL SUCCINATE 25 MG: 25 TABLET, EXTENDED RELEASE ORAL at 09:25

## 2019-05-06 PROBLEM — R11.2 NAUSEA & VOMITING: Status: ACTIVE | Noted: 2019-05-06

## 2019-05-06 PROBLEM — R07.9 CHEST PAIN: Chronic | Status: ACTIVE | Noted: 2019-05-03

## 2019-05-06 PROBLEM — F11.20 CONTINUOUS OPIOID DEPENDENCE (HCC): Chronic | Status: ACTIVE | Noted: 2019-05-05

## 2019-05-06 PROBLEM — I45.9 HEART BLOCK: Status: ACTIVE | Noted: 2019-05-06

## 2019-05-06 PROBLEM — I48.0 PAROXYSMAL ATRIAL FIBRILLATION (HCC): Chronic | Status: ACTIVE | Noted: 2019-04-01

## 2019-05-06 PROBLEM — K59.03 CONSTIPATION DUE TO PAIN MEDICATION: Chronic | Status: ACTIVE | Noted: 2019-04-01

## 2019-05-06 PROBLEM — I45.9 HEART BLOCK: Chronic | Status: ACTIVE | Noted: 2019-05-06

## 2019-05-06 LAB
ANION GAP SERPL CALCULATED.3IONS-SCNC: 11 MMOL/L (ref 4–13)
ANION GAP SERPL CALCULATED.3IONS-SCNC: 11 MMOL/L (ref 4–13)
ANION GAP SERPL CALCULATED.3IONS-SCNC: 13 MMOL/L (ref 4–13)
ANION GAP SERPL CALCULATED.3IONS-SCNC: 6 MMOL/L (ref 4–13)
ANION GAP SERPL CALCULATED.3IONS-SCNC: 9 MMOL/L (ref 4–13)
BUN SERPL-MCNC: 12 MG/DL (ref 5–25)
BUN SERPL-MCNC: 12 MG/DL (ref 5–25)
BUN SERPL-MCNC: 14 MG/DL (ref 5–25)
BUN SERPL-MCNC: 19 MG/DL (ref 5–25)
BUN SERPL-MCNC: 27 MG/DL (ref 5–25)
CALCIUM SERPL-MCNC: 8.2 MG/DL (ref 8.3–10.1)
CALCIUM SERPL-MCNC: 8.3 MG/DL (ref 8.3–10.1)
CALCIUM SERPL-MCNC: 8.4 MG/DL (ref 8.3–10.1)
CALCIUM SERPL-MCNC: 8.7 MG/DL (ref 8.3–10.1)
CALCIUM SERPL-MCNC: 8.7 MG/DL (ref 8.3–10.1)
CHLORIDE SERPL-SCNC: 80 MMOL/L (ref 100–108)
CHLORIDE SERPL-SCNC: 82 MMOL/L (ref 100–108)
CHLORIDE SERPL-SCNC: 82 MMOL/L (ref 100–108)
CHLORIDE SERPL-SCNC: 83 MMOL/L (ref 100–108)
CHLORIDE SERPL-SCNC: 85 MMOL/L (ref 100–108)
CO2 SERPL-SCNC: 21 MMOL/L (ref 21–32)
CO2 SERPL-SCNC: 25 MMOL/L (ref 21–32)
CO2 SERPL-SCNC: 25 MMOL/L (ref 21–32)
CO2 SERPL-SCNC: 27 MMOL/L (ref 21–32)
CO2 SERPL-SCNC: 28 MMOL/L (ref 21–32)
CORTIS SERPL-MCNC: 35.3 UG/DL
CREAT SERPL-MCNC: 0.98 MG/DL (ref 0.6–1.3)
CREAT SERPL-MCNC: 1.02 MG/DL (ref 0.6–1.3)
CREAT SERPL-MCNC: 1.02 MG/DL (ref 0.6–1.3)
CREAT SERPL-MCNC: 1.57 MG/DL (ref 0.6–1.3)
CREAT SERPL-MCNC: 1.78 MG/DL (ref 0.6–1.3)
GFR SERPL CREATININE-BSD FRML MDRD: 24 ML/MIN/1.73SQ M
GFR SERPL CREATININE-BSD FRML MDRD: 28 ML/MIN/1.73SQ M
GFR SERPL CREATININE-BSD FRML MDRD: 48 ML/MIN/1.73SQ M
GFR SERPL CREATININE-BSD FRML MDRD: 48 ML/MIN/1.73SQ M
GFR SERPL CREATININE-BSD FRML MDRD: 50 ML/MIN/1.73SQ M
GLUCOSE SERPL-MCNC: 106 MG/DL (ref 65–140)
GLUCOSE SERPL-MCNC: 115 MG/DL (ref 65–140)
GLUCOSE SERPL-MCNC: 120 MG/DL (ref 65–140)
GLUCOSE SERPL-MCNC: 128 MG/DL (ref 65–140)
GLUCOSE SERPL-MCNC: 129 MG/DL (ref 65–140)
GLUCOSE SERPL-MCNC: 139 MG/DL (ref 65–140)
MAGNESIUM SERPL-MCNC: 1.4 MG/DL (ref 1.6–2.6)
MAGNESIUM SERPL-MCNC: 2.8 MG/DL (ref 1.6–2.6)
OSMOLALITY UR/SERPL-RTO: 246 MMOL/KG (ref 282–298)
OSMOLALITY UR: 366 MMOL/KG
POTASSIUM SERPL-SCNC: 2.9 MMOL/L (ref 3.5–5.3)
POTASSIUM SERPL-SCNC: 3.1 MMOL/L (ref 3.5–5.3)
POTASSIUM SERPL-SCNC: 3.2 MMOL/L (ref 3.5–5.3)
POTASSIUM SERPL-SCNC: 3.5 MMOL/L (ref 3.5–5.3)
POTASSIUM SERPL-SCNC: 3.6 MMOL/L (ref 3.5–5.3)
POTASSIUM SERPL-SCNC: 3.9 MMOL/L (ref 3.5–5.3)
SODIUM 24H UR-SCNC: 59 MOL/L
SODIUM SERPL-SCNC: 115 MMOL/L (ref 136–145)
SODIUM SERPL-SCNC: 116 MMOL/L (ref 136–145)
SODIUM SERPL-SCNC: 119 MMOL/L (ref 136–145)
TSH SERPL DL<=0.05 MIU/L-ACNC: 1.45 UIU/ML (ref 0.36–3.74)
URATE SERPL-MCNC: 2.6 MG/DL (ref 2–6.8)

## 2019-05-06 PROCEDURE — 84132 ASSAY OF SERUM POTASSIUM: CPT | Performed by: PHYSICIAN ASSISTANT

## 2019-05-06 PROCEDURE — 93005 ELECTROCARDIOGRAM TRACING: CPT

## 2019-05-06 PROCEDURE — 84443 ASSAY THYROID STIM HORMONE: CPT | Performed by: NURSE PRACTITIONER

## 2019-05-06 PROCEDURE — 80048 BASIC METABOLIC PNL TOTAL CA: CPT | Performed by: PHYSICIAN ASSISTANT

## 2019-05-06 PROCEDURE — 97163 PT EVAL HIGH COMPLEX 45 MIN: CPT

## 2019-05-06 PROCEDURE — 82533 TOTAL CORTISOL: CPT | Performed by: NURSE PRACTITIONER

## 2019-05-06 PROCEDURE — 99232 SBSQ HOSP IP/OBS MODERATE 35: CPT | Performed by: INTERNAL MEDICINE

## 2019-05-06 PROCEDURE — 82948 REAGENT STRIP/BLOOD GLUCOSE: CPT

## 2019-05-06 PROCEDURE — 83935 ASSAY OF URINE OSMOLALITY: CPT | Performed by: NURSE PRACTITIONER

## 2019-05-06 PROCEDURE — G8979 MOBILITY GOAL STATUS: HCPCS

## 2019-05-06 PROCEDURE — 83735 ASSAY OF MAGNESIUM: CPT | Performed by: PHYSICIAN ASSISTANT

## 2019-05-06 PROCEDURE — G8978 MOBILITY CURRENT STATUS: HCPCS

## 2019-05-06 PROCEDURE — 84550 ASSAY OF BLOOD/URIC ACID: CPT | Performed by: NURSE PRACTITIONER

## 2019-05-06 PROCEDURE — 80048 BASIC METABOLIC PNL TOTAL CA: CPT | Performed by: INTERNAL MEDICINE

## 2019-05-06 PROCEDURE — 83930 ASSAY OF BLOOD OSMOLALITY: CPT | Performed by: NURSE PRACTITIONER

## 2019-05-06 PROCEDURE — 84300 ASSAY OF URINE SODIUM: CPT | Performed by: NURSE PRACTITIONER

## 2019-05-06 RX ORDER — PROCHLORPERAZINE MALEATE 10 MG
5 TABLET ORAL ONCE
Status: COMPLETED | OUTPATIENT
Start: 2019-05-06 | End: 2019-05-06

## 2019-05-06 RX ORDER — POLYETHYLENE GLYCOL 3350 17 G/17G
17 POWDER, FOR SOLUTION ORAL DAILY
Status: DISCONTINUED | OUTPATIENT
Start: 2019-05-06 | End: 2019-05-07

## 2019-05-06 RX ORDER — HYDRALAZINE HYDROCHLORIDE 20 MG/ML
10 INJECTION INTRAMUSCULAR; INTRAVENOUS EVERY 6 HOURS PRN
Status: DISCONTINUED | OUTPATIENT
Start: 2019-05-06 | End: 2019-05-12 | Stop reason: HOSPADM

## 2019-05-06 RX ORDER — AMLODIPINE BESYLATE 5 MG/1
10 TABLET ORAL DAILY
Status: DISCONTINUED | OUTPATIENT
Start: 2019-05-07 | End: 2019-05-07

## 2019-05-06 RX ORDER — MAGNESIUM SULFATE HEPTAHYDRATE 40 MG/ML
2 INJECTION, SOLUTION INTRAVENOUS ONCE
Status: COMPLETED | OUTPATIENT
Start: 2019-05-06 | End: 2019-05-06

## 2019-05-06 RX ORDER — ATROPINE SULFATE 0.1 MG/ML
0.5 INJECTION INTRAVENOUS AS NEEDED
Status: DISCONTINUED | OUTPATIENT
Start: 2019-05-06 | End: 2019-05-06 | Stop reason: SDUPTHER

## 2019-05-06 RX ORDER — POTASSIUM CHLORIDE 14.9 MG/ML
20 INJECTION INTRAVENOUS ONCE
Status: COMPLETED | OUTPATIENT
Start: 2019-05-06 | End: 2019-05-06

## 2019-05-06 RX ORDER — POTASSIUM CHLORIDE 29.8 MG/ML
40 INJECTION INTRAVENOUS ONCE
Status: DISCONTINUED | OUTPATIENT
Start: 2019-05-06 | End: 2019-05-06 | Stop reason: SDUPTHER

## 2019-05-06 RX ORDER — BISACODYL 10 MG
10 SUPPOSITORY, RECTAL RECTAL DAILY PRN
Status: DISCONTINUED | OUTPATIENT
Start: 2019-05-06 | End: 2019-05-12 | Stop reason: HOSPADM

## 2019-05-06 RX ORDER — PANTOPRAZOLE SODIUM 40 MG/1
40 TABLET, DELAYED RELEASE ORAL
Status: DISCONTINUED | OUTPATIENT
Start: 2019-05-06 | End: 2019-05-12 | Stop reason: HOSPADM

## 2019-05-06 RX ORDER — ATROPINE SULFATE 1 MG/ML
INJECTION, SOLUTION INTRAMUSCULAR; INTRAVENOUS; SUBCUTANEOUS
Status: COMPLETED
Start: 2019-05-06 | End: 2019-05-07

## 2019-05-06 RX ORDER — ATROPINE SULFATE 1 MG/ML
0.5 INJECTION, SOLUTION INTRAMUSCULAR; INTRAVENOUS; SUBCUTANEOUS AS NEEDED
Status: DISCONTINUED | OUTPATIENT
Start: 2019-05-06 | End: 2019-05-12 | Stop reason: HOSPADM

## 2019-05-06 RX ORDER — SODIUM CHLORIDE 1000 MG
2 TABLET, SOLUBLE MISCELLANEOUS ONCE
Status: COMPLETED | OUTPATIENT
Start: 2019-05-06 | End: 2019-05-06

## 2019-05-06 RX ORDER — SODIUM CHLORIDE 1000 MG
3 TABLET, SOLUBLE MISCELLANEOUS
Status: DISCONTINUED | OUTPATIENT
Start: 2019-05-06 | End: 2019-05-07

## 2019-05-06 RX ORDER — POTASSIUM CHLORIDE 20 MEQ/1
40 TABLET, EXTENDED RELEASE ORAL ONCE
Status: COMPLETED | OUTPATIENT
Start: 2019-05-06 | End: 2019-05-06

## 2019-05-06 RX ORDER — AMOXICILLIN 250 MG
2 CAPSULE ORAL 2 TIMES DAILY
Status: DISCONTINUED | OUTPATIENT
Start: 2019-05-06 | End: 2019-05-12 | Stop reason: HOSPADM

## 2019-05-06 RX ORDER — TORSEMIDE 10 MG/1
5 TABLET ORAL DAILY
Status: DISCONTINUED | OUTPATIENT
Start: 2019-05-06 | End: 2019-05-07

## 2019-05-06 RX ORDER — FUROSEMIDE 10 MG/ML
20 INJECTION INTRAMUSCULAR; INTRAVENOUS ONCE
Status: COMPLETED | OUTPATIENT
Start: 2019-05-06 | End: 2019-05-06

## 2019-05-06 RX ORDER — LABETALOL 100 MG/1
100 TABLET, FILM COATED ORAL EVERY 12 HOURS SCHEDULED
Status: DISCONTINUED | OUTPATIENT
Start: 2019-05-07 | End: 2019-05-07

## 2019-05-06 RX ORDER — ALBUMIN, HUMAN INJ 5% 5 %
12.5 SOLUTION INTRAVENOUS ONCE
Status: COMPLETED | OUTPATIENT
Start: 2019-05-06 | End: 2019-05-07

## 2019-05-06 RX ADMIN — AMIODARONE HYDROCHLORIDE 100 MG: 200 TABLET ORAL at 08:01

## 2019-05-06 RX ADMIN — HEPARIN SODIUM 5000 UNITS: 5000 INJECTION INTRAVENOUS; SUBCUTANEOUS at 23:10

## 2019-05-06 RX ADMIN — TORSEMIDE 5 MG: 10 TABLET ORAL at 17:44

## 2019-05-06 RX ADMIN — SODIUM CHLORIDE TAB 1 GM 3 G: 1 TAB at 23:09

## 2019-05-06 RX ADMIN — ONDANSETRON 4 MG: 2 INJECTION INTRAMUSCULAR; INTRAVENOUS at 09:29

## 2019-05-06 RX ADMIN — DOCUSATE SODIUM 100 MG: 100 CAPSULE, LIQUID FILLED ORAL at 08:01

## 2019-05-06 RX ADMIN — POTASSIUM CHLORIDE 20 MEQ: 14.9 INJECTION, SOLUTION INTRAVENOUS at 02:36

## 2019-05-06 RX ADMIN — ASPIRIN 81 MG 81 MG: 81 TABLET ORAL at 08:02

## 2019-05-06 RX ADMIN — AMLODIPINE BESYLATE 10 MG: 10 TABLET ORAL at 08:02

## 2019-05-06 RX ADMIN — TRAZODONE HYDROCHLORIDE 50 MG: 50 TABLET ORAL at 23:09

## 2019-05-06 RX ADMIN — SODIUM CHLORIDE 500 ML: 0.9 INJECTION, SOLUTION INTRAVENOUS at 22:31

## 2019-05-06 RX ADMIN — GABAPENTIN 100 MG: 100 CAPSULE ORAL at 17:52

## 2019-05-06 RX ADMIN — ROPINIROLE HYDROCHLORIDE 2 MG: 1 TABLET, FILM COATED ORAL at 23:10

## 2019-05-06 RX ADMIN — SODIUM CHLORIDE TAB 1 GM 2 G: 1 TAB at 11:05

## 2019-05-06 RX ADMIN — HYDROCODONE BITARTRATE AND ACETAMINOPHEN 1 TABLET: 5; 325 TABLET ORAL at 05:18

## 2019-05-06 RX ADMIN — LISINOPRIL 20 MG: 5 TABLET ORAL at 08:01

## 2019-05-06 RX ADMIN — HEPARIN SODIUM 5000 UNITS: 5000 INJECTION INTRAVENOUS; SUBCUTANEOUS at 13:01

## 2019-05-06 RX ADMIN — ALPRAZOLAM 0.25 MG: 0.25 TABLET ORAL at 09:40

## 2019-05-06 RX ADMIN — FAMOTIDINE 20 MG: 20 TABLET ORAL at 08:02

## 2019-05-06 RX ADMIN — ALBUMIN (HUMAN) 12.5 G: 12.5 SOLUTION INTRAVENOUS at 21:37

## 2019-05-06 RX ADMIN — PROCHLORPERAZINE MALEATE 5 MG: 10 TABLET, FILM COATED ORAL at 01:45

## 2019-05-06 RX ADMIN — POTASSIUM CHLORIDE 40 MEQ: 1500 TABLET, EXTENDED RELEASE ORAL at 17:52

## 2019-05-06 RX ADMIN — LABETALOL HYDROCHLORIDE 100 MG: 100 TABLET, FILM COATED ORAL at 08:02

## 2019-05-06 RX ADMIN — SENNOSIDES 8.6 MG: 8.6 TABLET, FILM COATED ORAL at 09:41

## 2019-05-06 RX ADMIN — SODIUM CHLORIDE TAB 1 GM 3 G: 1 TAB at 17:48

## 2019-05-06 RX ADMIN — PANTOPRAZOLE SODIUM 40 MG: 40 TABLET, DELAYED RELEASE ORAL at 17:45

## 2019-05-06 RX ADMIN — DORZOLAMIDE HYDROCHLORIDE AND TIMOLOL MALEATE 1 DROP: 20; 5 SOLUTION/ DROPS OPHTHALMIC at 08:03

## 2019-05-06 RX ADMIN — HYDROCODONE BITARTRATE AND ACETAMINOPHEN 1 TABLET: 5; 325 TABLET ORAL at 11:09

## 2019-05-06 RX ADMIN — FUROSEMIDE 20 MG: 10 INJECTION, SOLUTION INTRAMUSCULAR; INTRAVENOUS at 00:43

## 2019-05-06 RX ADMIN — MAGNESIUM SULFATE HEPTAHYDRATE 2 G: 40 INJECTION, SOLUTION INTRAVENOUS at 05:27

## 2019-05-06 RX ADMIN — HEPARIN SODIUM 5000 UNITS: 5000 INJECTION INTRAVENOUS; SUBCUTANEOUS at 05:18

## 2019-05-06 RX ADMIN — GABAPENTIN 100 MG: 100 CAPSULE ORAL at 08:02

## 2019-05-06 RX ADMIN — POTASSIUM CHLORIDE 20 MEQ: 14.9 INJECTION, SOLUTION INTRAVENOUS at 04:19

## 2019-05-06 RX ADMIN — ONDANSETRON 4 MG: 2 INJECTION INTRAMUSCULAR; INTRAVENOUS at 00:40

## 2019-05-06 RX ADMIN — PANTOPRAZOLE SODIUM 40 MG: 40 TABLET, DELAYED RELEASE ORAL at 05:18

## 2019-05-07 ENCOUNTER — APPOINTMENT (INPATIENT)
Dept: RADIOLOGY | Facility: HOSPITAL | Age: 84
DRG: 305 | End: 2019-05-07
Payer: MEDICARE

## 2019-05-07 PROBLEM — N17.9 AKI (ACUTE KIDNEY INJURY) (HCC): Status: ACTIVE | Noted: 2019-05-07

## 2019-05-07 LAB
ANION GAP SERPL CALCULATED.3IONS-SCNC: 12 MMOL/L (ref 4–13)
ANION GAP SERPL CALCULATED.3IONS-SCNC: 14 MMOL/L (ref 4–13)
ATRIAL RATE: 63 BPM
BACTERIA UR QL AUTO: NORMAL /HPF
BILIRUB UR QL STRIP: NEGATIVE
BUN SERPL-MCNC: 29 MG/DL (ref 5–25)
BUN SERPL-MCNC: 31 MG/DL (ref 5–25)
CALCIUM SERPL-MCNC: 7.8 MG/DL (ref 8.3–10.1)
CALCIUM SERPL-MCNC: 7.8 MG/DL (ref 8.3–10.1)
CALCIUM SERPL-MCNC: 8.1 MG/DL (ref 8.3–10.1)
CALCIUM SERPL-MCNC: 8.1 MG/DL (ref 8.3–10.1)
CHLORIDE SERPL-SCNC: 89 MMOL/L (ref 100–108)
CHLORIDE SERPL-SCNC: 92 MMOL/L (ref 100–108)
CLARITY UR: CLEAR
CO2 SERPL-SCNC: 21 MMOL/L (ref 21–32)
CO2 SERPL-SCNC: 22 MMOL/L (ref 21–32)
CO2 SERPL-SCNC: 22 MMOL/L (ref 21–32)
CO2 SERPL-SCNC: 23 MMOL/L (ref 21–32)
COLOR UR: YELLOW
CREAT SERPL-MCNC: 1.74 MG/DL (ref 0.6–1.3)
CREAT SERPL-MCNC: 1.81 MG/DL (ref 0.6–1.3)
CREAT SERPL-MCNC: 1.84 MG/DL (ref 0.6–1.3)
CREAT SERPL-MCNC: 1.85 MG/DL (ref 0.6–1.3)
DATE SPECIMEN #1: ABNORMAL
ERYTHROCYTE [DISTWIDTH] IN BLOOD BY AUTOMATED COUNT: 13.3 % (ref 11.6–15.1)
GFR SERPL CREATININE-BSD FRML MDRD: 23 ML/MIN/1.73SQ M
GFR SERPL CREATININE-BSD FRML MDRD: 23 ML/MIN/1.73SQ M
GFR SERPL CREATININE-BSD FRML MDRD: 24 ML/MIN/1.73SQ M
GFR SERPL CREATININE-BSD FRML MDRD: 25 ML/MIN/1.73SQ M
GLUCOSE SERPL-MCNC: 103 MG/DL (ref 65–140)
GLUCOSE SERPL-MCNC: 117 MG/DL (ref 65–140)
GLUCOSE SERPL-MCNC: 126 MG/DL (ref 65–140)
GLUCOSE SERPL-MCNC: 139 MG/DL (ref 65–140)
GLUCOSE UR STRIP-MCNC: NEGATIVE MG/DL
HCT VFR BLD AUTO: 36.7 % (ref 34.8–46.1)
HEMOCCULT SP1 STL QL: POSITIVE
HGB BLD-MCNC: 13.1 G/DL (ref 11.5–15.4)
HGB UR QL STRIP.AUTO: NEGATIVE
KETONES UR STRIP-MCNC: NEGATIVE MG/DL
LEUKOCYTE ESTERASE UR QL STRIP: NEGATIVE
MAGNESIUM SERPL-MCNC: 2.4 MG/DL (ref 1.6–2.6)
MCH RBC QN AUTO: 32.1 PG (ref 26.8–34.3)
MCHC RBC AUTO-ENTMCNC: 35.7 G/DL (ref 31.4–37.4)
MCV RBC AUTO: 90 FL (ref 82–98)
NITRITE UR QL STRIP: NEGATIVE
NON-SQ EPI CELLS URNS QL MICRO: NORMAL /HPF
OTHER STN SPEC: NORMAL
P AXIS: 85 DEGREES
PH UR STRIP.AUTO: 5 [PH]
PLATELET # BLD AUTO: 213 THOUSANDS/UL (ref 149–390)
PMV BLD AUTO: 8.9 FL (ref 8.9–12.7)
POTASSIUM SERPL-SCNC: 3.5 MMOL/L (ref 3.5–5.3)
POTASSIUM SERPL-SCNC: 3.7 MMOL/L (ref 3.5–5.3)
POTASSIUM SERPL-SCNC: 3.8 MMOL/L (ref 3.5–5.3)
POTASSIUM SERPL-SCNC: 4.4 MMOL/L (ref 3.5–5.3)
PR INTERVAL: 296 MS
PROT UR STRIP-MCNC: NEGATIVE MG/DL
QRS AXIS: -62 DEGREES
QRSD INTERVAL: 102 MS
QT INTERVAL: 432 MS
QTC INTERVAL: 442 MS
RBC # BLD AUTO: 4.08 MILLION/UL (ref 3.81–5.12)
RBC #/AREA URNS AUTO: NORMAL /HPF
SODIUM SERPL-SCNC: 124 MMOL/L (ref 136–145)
SODIUM SERPL-SCNC: 125 MMOL/L (ref 136–145)
SODIUM SERPL-SCNC: 126 MMOL/L (ref 136–145)
SODIUM SERPL-SCNC: 128 MMOL/L (ref 136–145)
SP GR UR STRIP.AUTO: 1.01 (ref 1–1.03)
T WAVE AXIS: 90 DEGREES
UROBILINOGEN UR QL STRIP.AUTO: 0.2 E.U./DL
VENTRICULAR RATE: 63 BPM
WBC # BLD AUTO: 11.61 THOUSAND/UL (ref 4.31–10.16)
WBC #/AREA URNS AUTO: NORMAL /HPF

## 2019-05-07 PROCEDURE — 80048 BASIC METABOLIC PNL TOTAL CA: CPT | Performed by: PHYSICIAN ASSISTANT

## 2019-05-07 PROCEDURE — 80048 BASIC METABOLIC PNL TOTAL CA: CPT | Performed by: NURSE PRACTITIONER

## 2019-05-07 PROCEDURE — 93010 ELECTROCARDIOGRAM REPORT: CPT | Performed by: INTERNAL MEDICINE

## 2019-05-07 PROCEDURE — 99232 SBSQ HOSP IP/OBS MODERATE 35: CPT | Performed by: INTERNAL MEDICINE

## 2019-05-07 PROCEDURE — 71045 X-RAY EXAM CHEST 1 VIEW: CPT

## 2019-05-07 PROCEDURE — 85027 COMPLETE CBC AUTOMATED: CPT | Performed by: HOSPITALIST

## 2019-05-07 PROCEDURE — 83735 ASSAY OF MAGNESIUM: CPT | Performed by: HOSPITALIST

## 2019-05-07 PROCEDURE — 99232 SBSQ HOSP IP/OBS MODERATE 35: CPT | Performed by: HOSPITALIST

## 2019-05-07 PROCEDURE — 81001 URINALYSIS AUTO W/SCOPE: CPT | Performed by: NURSE PRACTITIONER

## 2019-05-07 PROCEDURE — 80048 BASIC METABOLIC PNL TOTAL CA: CPT | Performed by: INTERNAL MEDICINE

## 2019-05-07 PROCEDURE — 82270 OCCULT BLOOD FECES: CPT | Performed by: PHYSICIAN ASSISTANT

## 2019-05-07 RX ORDER — ALBUMIN, HUMAN INJ 5% 5 %
25 SOLUTION INTRAVENOUS ONCE
Status: COMPLETED | OUTPATIENT
Start: 2019-05-07 | End: 2019-05-07

## 2019-05-07 RX ORDER — SODIUM CHLORIDE, SODIUM GLUCONATE, SODIUM ACETATE, POTASSIUM CHLORIDE, MAGNESIUM CHLORIDE, SODIUM PHOSPHATE, DIBASIC, AND POTASSIUM PHOSPHATE .53; .5; .37; .037; .03; .012; .00082 G/100ML; G/100ML; G/100ML; G/100ML; G/100ML; G/100ML; G/100ML
500 INJECTION, SOLUTION INTRAVENOUS ONCE
Status: COMPLETED | OUTPATIENT
Start: 2019-05-07 | End: 2019-05-07

## 2019-05-07 RX ORDER — SODIUM CHLORIDE 1000 MG
2 TABLET, SOLUBLE MISCELLANEOUS
Status: DISCONTINUED | OUTPATIENT
Start: 2019-05-07 | End: 2019-05-07

## 2019-05-07 RX ORDER — POTASSIUM CHLORIDE 20 MEQ/1
40 TABLET, EXTENDED RELEASE ORAL ONCE
Status: COMPLETED | OUTPATIENT
Start: 2019-05-07 | End: 2019-05-07

## 2019-05-07 RX ORDER — ALBUMIN, HUMAN INJ 5% 5 %
12.5 SOLUTION INTRAVENOUS ONCE
Status: DISCONTINUED | OUTPATIENT
Start: 2019-05-07 | End: 2019-05-07

## 2019-05-07 RX ORDER — SODIUM CHLORIDE 1000 MG
2 TABLET, SOLUBLE MISCELLANEOUS
Status: DISCONTINUED | OUTPATIENT
Start: 2019-05-07 | End: 2019-05-09

## 2019-05-07 RX ADMIN — DORZOLAMIDE HYDROCHLORIDE AND TIMOLOL MALEATE 1 DROP: 20; 5 SOLUTION/ DROPS OPHTHALMIC at 17:09

## 2019-05-07 RX ADMIN — AMIODARONE HYDROCHLORIDE 100 MG: 200 TABLET ORAL at 09:28

## 2019-05-07 RX ADMIN — HYDROCODONE BITARTRATE AND ACETAMINOPHEN 1 TABLET: 5; 325 TABLET ORAL at 21:53

## 2019-05-07 RX ADMIN — ALBUMIN (HUMAN) 12 G: 12.5 SOLUTION INTRAVENOUS at 00:41

## 2019-05-07 RX ADMIN — GABAPENTIN 100 MG: 100 CAPSULE ORAL at 09:28

## 2019-05-07 RX ADMIN — PANTOPRAZOLE SODIUM 40 MG: 40 TABLET, DELAYED RELEASE ORAL at 09:27

## 2019-05-07 RX ADMIN — HYDROCODONE BITARTRATE AND ACETAMINOPHEN 1 TABLET: 5; 325 TABLET ORAL at 17:09

## 2019-05-07 RX ADMIN — ROPINIROLE HYDROCHLORIDE 2 MG: 1 TABLET, FILM COATED ORAL at 21:41

## 2019-05-07 RX ADMIN — HEPARIN SODIUM 5000 UNITS: 5000 INJECTION INTRAVENOUS; SUBCUTANEOUS at 21:41

## 2019-05-07 RX ADMIN — PANTOPRAZOLE SODIUM 40 MG: 40 TABLET, DELAYED RELEASE ORAL at 17:09

## 2019-05-07 RX ADMIN — ASPIRIN 81 MG 81 MG: 81 TABLET ORAL at 09:27

## 2019-05-07 RX ADMIN — Medication: at 00:04

## 2019-05-07 RX ADMIN — HEPARIN SODIUM 5000 UNITS: 5000 INJECTION INTRAVENOUS; SUBCUTANEOUS at 06:07

## 2019-05-07 RX ADMIN — HYDROCODONE BITARTRATE AND ACETAMINOPHEN 1 TABLET: 5; 325 TABLET ORAL at 10:47

## 2019-05-07 RX ADMIN — SODIUM CHLORIDE, SODIUM GLUCONATE, SODIUM ACETATE, POTASSIUM CHLORIDE, MAGNESIUM CHLORIDE, SODIUM PHOSPHATE, DIBASIC, AND POTASSIUM PHOSPHATE 500 ML: .53; .5; .37; .037; .03; .012; .00082 INJECTION, SOLUTION INTRAVENOUS at 09:22

## 2019-05-07 RX ADMIN — FAMOTIDINE 20 MG: 20 TABLET ORAL at 09:27

## 2019-05-07 RX ADMIN — POTASSIUM CHLORIDE 40 MEQ: 1500 TABLET, EXTENDED RELEASE ORAL at 13:21

## 2019-05-07 RX ADMIN — DORZOLAMIDE HYDROCHLORIDE AND TIMOLOL MALEATE 1 DROP: 20; 5 SOLUTION/ DROPS OPHTHALMIC at 09:31

## 2019-05-07 RX ADMIN — GABAPENTIN 100 MG: 100 CAPSULE ORAL at 17:09

## 2019-05-07 RX ADMIN — TRAZODONE HYDROCHLORIDE 50 MG: 50 TABLET ORAL at 21:41

## 2019-05-07 RX ADMIN — SODIUM CHLORIDE TAB 1 GM 3 G: 1 TAB at 09:31

## 2019-05-07 RX ADMIN — HEPARIN SODIUM 5000 UNITS: 5000 INJECTION INTRAVENOUS; SUBCUTANEOUS at 13:21

## 2019-05-07 RX ADMIN — SODIUM CHLORIDE TAB 1 GM 2 G: 1 TAB at 18:14

## 2019-05-08 DIAGNOSIS — M54.50 LOW BACK PAIN: ICD-10-CM

## 2019-05-08 LAB
ANION GAP SERPL CALCULATED.3IONS-SCNC: 9 MMOL/L (ref 4–13)
BASOPHILS # BLD AUTO: 0.02 THOUSANDS/ΜL (ref 0–0.1)
BASOPHILS NFR BLD AUTO: 0 % (ref 0–1)
BUN SERPL-MCNC: 26 MG/DL (ref 5–25)
CALCIUM SERPL-MCNC: 8.1 MG/DL (ref 8.3–10.1)
CHLORIDE SERPL-SCNC: 98 MMOL/L (ref 100–108)
CO2 SERPL-SCNC: 25 MMOL/L (ref 21–32)
CREAT SERPL-MCNC: 1.57 MG/DL (ref 0.6–1.3)
EOSINOPHIL # BLD AUTO: 0.01 THOUSAND/ΜL (ref 0–0.61)
EOSINOPHIL NFR BLD AUTO: 0 % (ref 0–6)
ERYTHROCYTE [DISTWIDTH] IN BLOOD BY AUTOMATED COUNT: 14.6 % (ref 11.6–15.1)
GFR SERPL CREATININE-BSD FRML MDRD: 28 ML/MIN/1.73SQ M
GLUCOSE SERPL-MCNC: 92 MG/DL (ref 65–140)
HCT VFR BLD AUTO: 34.1 % (ref 34.8–46.1)
HGB BLD-MCNC: 11.8 G/DL (ref 11.5–15.4)
IMM GRANULOCYTES # BLD AUTO: 0.02 THOUSAND/UL (ref 0–0.2)
IMM GRANULOCYTES NFR BLD AUTO: 0 % (ref 0–2)
LYMPHOCYTES # BLD AUTO: 1.59 THOUSANDS/ΜL (ref 0.6–4.47)
LYMPHOCYTES NFR BLD AUTO: 19 % (ref 14–44)
MCH RBC QN AUTO: 31.5 PG (ref 26.8–34.3)
MCHC RBC AUTO-ENTMCNC: 34.6 G/DL (ref 31.4–37.4)
MCV RBC AUTO: 91 FL (ref 82–98)
MONOCYTES # BLD AUTO: 0.79 THOUSAND/ΜL (ref 0.17–1.22)
MONOCYTES NFR BLD AUTO: 9 % (ref 4–12)
NEUTROPHILS # BLD AUTO: 5.97 THOUSANDS/ΜL (ref 1.85–7.62)
NEUTS SEG NFR BLD AUTO: 72 % (ref 43–75)
NRBC BLD AUTO-RTO: 0 /100 WBCS
PLATELET # BLD AUTO: 208 THOUSANDS/UL (ref 149–390)
PMV BLD AUTO: 8.5 FL (ref 8.9–12.7)
POTASSIUM SERPL-SCNC: 4.4 MMOL/L (ref 3.5–5.3)
RBC # BLD AUTO: 3.75 MILLION/UL (ref 3.81–5.12)
SODIUM SERPL-SCNC: 132 MMOL/L (ref 136–145)
WBC # BLD AUTO: 8.4 THOUSAND/UL (ref 4.31–10.16)

## 2019-05-08 PROCEDURE — 99232 SBSQ HOSP IP/OBS MODERATE 35: CPT | Performed by: HOSPITALIST

## 2019-05-08 PROCEDURE — 85025 COMPLETE CBC W/AUTO DIFF WBC: CPT | Performed by: HOSPITALIST

## 2019-05-08 PROCEDURE — 99232 SBSQ HOSP IP/OBS MODERATE 35: CPT | Performed by: INTERNAL MEDICINE

## 2019-05-08 PROCEDURE — 99231 SBSQ HOSP IP/OBS SF/LOW 25: CPT | Performed by: INTERNAL MEDICINE

## 2019-05-08 PROCEDURE — 80048 BASIC METABOLIC PNL TOTAL CA: CPT | Performed by: HOSPITALIST

## 2019-05-08 RX ORDER — DICYCLOMINE HCL 20 MG
20 TABLET ORAL
Status: DISCONTINUED | OUTPATIENT
Start: 2019-05-08 | End: 2019-05-09

## 2019-05-08 RX ORDER — SIMETHICONE 80 MG
80 TABLET,CHEWABLE ORAL EVERY 6 HOURS PRN
Status: DISCONTINUED | OUTPATIENT
Start: 2019-05-08 | End: 2019-05-12 | Stop reason: HOSPADM

## 2019-05-08 RX ADMIN — ROPINIROLE HYDROCHLORIDE 2 MG: 1 TABLET, FILM COATED ORAL at 22:02

## 2019-05-08 RX ADMIN — SIMETHICONE CHEW TAB 80 MG 80 MG: 80 TABLET ORAL at 22:24

## 2019-05-08 RX ADMIN — SODIUM CHLORIDE TAB 1 GM 2 G: 1 TAB at 08:01

## 2019-05-08 RX ADMIN — ASPIRIN 81 MG 81 MG: 81 TABLET ORAL at 08:01

## 2019-05-08 RX ADMIN — HEPARIN SODIUM 5000 UNITS: 5000 INJECTION INTRAVENOUS; SUBCUTANEOUS at 05:00

## 2019-05-08 RX ADMIN — CALCIUM CARBONATE (ANTACID) CHEW TAB 500 MG 1000 MG: 500 CHEW TAB at 14:06

## 2019-05-08 RX ADMIN — HEPARIN SODIUM 5000 UNITS: 5000 INJECTION INTRAVENOUS; SUBCUTANEOUS at 22:00

## 2019-05-08 RX ADMIN — AMIODARONE HYDROCHLORIDE 100 MG: 200 TABLET ORAL at 08:00

## 2019-05-08 RX ADMIN — ALPRAZOLAM 0.25 MG: 0.25 TABLET ORAL at 22:00

## 2019-05-08 RX ADMIN — DORZOLAMIDE HYDROCHLORIDE AND TIMOLOL MALEATE 1 DROP: 20; 5 SOLUTION/ DROPS OPHTHALMIC at 17:03

## 2019-05-08 RX ADMIN — SODIUM CHLORIDE TAB 1 GM 2 G: 1 TAB at 13:31

## 2019-05-08 RX ADMIN — TRAZODONE HYDROCHLORIDE 50 MG: 50 TABLET ORAL at 22:00

## 2019-05-08 RX ADMIN — DORZOLAMIDE HYDROCHLORIDE AND TIMOLOL MALEATE 1 DROP: 20; 5 SOLUTION/ DROPS OPHTHALMIC at 08:01

## 2019-05-08 RX ADMIN — HYDROCODONE BITARTRATE AND ACETAMINOPHEN 1 TABLET: 5; 325 TABLET ORAL at 05:00

## 2019-05-08 RX ADMIN — PANTOPRAZOLE SODIUM 40 MG: 40 TABLET, DELAYED RELEASE ORAL at 16:59

## 2019-05-08 RX ADMIN — ACETAMINOPHEN 650 MG: 325 TABLET, FILM COATED ORAL at 14:06

## 2019-05-08 RX ADMIN — HYDROCODONE BITARTRATE AND ACETAMINOPHEN 1 TABLET: 5; 325 TABLET ORAL at 16:59

## 2019-05-08 RX ADMIN — GABAPENTIN 100 MG: 100 CAPSULE ORAL at 17:02

## 2019-05-08 RX ADMIN — HYDROCODONE BITARTRATE AND ACETAMINOPHEN 1 TABLET: 5; 325 TABLET ORAL at 22:00

## 2019-05-08 RX ADMIN — DICYCLOMINE HYDROCHLORIDE 20 MG: 20 TABLET ORAL at 22:01

## 2019-05-08 RX ADMIN — PANTOPRAZOLE SODIUM 40 MG: 40 TABLET, DELAYED RELEASE ORAL at 08:00

## 2019-05-08 RX ADMIN — SODIUM CHLORIDE TAB 1 GM 2 G: 1 TAB at 17:02

## 2019-05-08 RX ADMIN — ONDANSETRON 4 MG: 2 INJECTION INTRAMUSCULAR; INTRAVENOUS at 17:41

## 2019-05-08 RX ADMIN — FAMOTIDINE 20 MG: 20 TABLET ORAL at 08:01

## 2019-05-08 RX ADMIN — GABAPENTIN 100 MG: 100 CAPSULE ORAL at 08:01

## 2019-05-09 LAB
ANION GAP SERPL CALCULATED.3IONS-SCNC: 6 MMOL/L (ref 4–13)
BUN SERPL-MCNC: 22 MG/DL (ref 5–25)
CALCIUM SERPL-MCNC: 8.4 MG/DL (ref 8.3–10.1)
CHLORIDE SERPL-SCNC: 101 MMOL/L (ref 100–108)
CO2 SERPL-SCNC: 24 MMOL/L (ref 21–32)
CREAT SERPL-MCNC: 1.13 MG/DL (ref 0.6–1.3)
GFR SERPL CREATININE-BSD FRML MDRD: 42 ML/MIN/1.73SQ M
GLUCOSE SERPL-MCNC: 84 MG/DL (ref 65–140)
LIPASE SERPL-CCNC: 318 U/L (ref 73–393)
MAGNESIUM SERPL-MCNC: 2.5 MG/DL (ref 1.6–2.6)
POTASSIUM SERPL-SCNC: 5.3 MMOL/L (ref 3.5–5.3)
SODIUM SERPL-SCNC: 131 MMOL/L (ref 136–145)

## 2019-05-09 PROCEDURE — 99222 1ST HOSP IP/OBS MODERATE 55: CPT | Performed by: INTERNAL MEDICINE

## 2019-05-09 PROCEDURE — 83690 ASSAY OF LIPASE: CPT | Performed by: PHYSICIAN ASSISTANT

## 2019-05-09 PROCEDURE — 99232 SBSQ HOSP IP/OBS MODERATE 35: CPT | Performed by: INTERNAL MEDICINE

## 2019-05-09 PROCEDURE — 97116 GAIT TRAINING THERAPY: CPT

## 2019-05-09 PROCEDURE — 80048 BASIC METABOLIC PNL TOTAL CA: CPT | Performed by: HOSPITALIST

## 2019-05-09 PROCEDURE — 83735 ASSAY OF MAGNESIUM: CPT | Performed by: HOSPITALIST

## 2019-05-09 PROCEDURE — 99232 SBSQ HOSP IP/OBS MODERATE 35: CPT | Performed by: HOSPITALIST

## 2019-05-09 RX ORDER — POLYETHYLENE GLYCOL 3350 17 G/17G
17 POWDER, FOR SOLUTION ORAL DAILY
Status: DISCONTINUED | OUTPATIENT
Start: 2019-05-09 | End: 2019-05-10

## 2019-05-09 RX ORDER — POLYVINYL ALCOHOL 14 MG/ML
1 SOLUTION/ DROPS OPHTHALMIC
Status: DISCONTINUED | OUTPATIENT
Start: 2019-05-09 | End: 2019-05-12 | Stop reason: HOSPADM

## 2019-05-09 RX ORDER — SUCRALFATE ORAL 1 G/10ML
1000 SUSPENSION ORAL EVERY 6 HOURS SCHEDULED
Status: DISCONTINUED | OUTPATIENT
Start: 2019-05-09 | End: 2019-05-12 | Stop reason: HOSPADM

## 2019-05-09 RX ORDER — HYDROCODONE BITARTRATE AND ACETAMINOPHEN 5; 325 MG/1; MG/1
1 TABLET ORAL EVERY 6 HOURS PRN
Status: DISCONTINUED | OUTPATIENT
Start: 2019-05-09 | End: 2019-05-12 | Stop reason: HOSPADM

## 2019-05-09 RX ORDER — SODIUM CHLORIDE 1000 MG
3 TABLET, SOLUBLE MISCELLANEOUS
Status: DISCONTINUED | OUTPATIENT
Start: 2019-05-09 | End: 2019-05-12 | Stop reason: HOSPADM

## 2019-05-09 RX ADMIN — SIMETHICONE CHEW TAB 80 MG 80 MG: 80 TABLET ORAL at 20:57

## 2019-05-09 RX ADMIN — SENNOSIDES AND DOCUSATE SODIUM 2 TABLET: 8.6; 5 TABLET ORAL at 08:28

## 2019-05-09 RX ADMIN — HEPARIN SODIUM 5000 UNITS: 5000 INJECTION INTRAVENOUS; SUBCUTANEOUS at 14:54

## 2019-05-09 RX ADMIN — SENNOSIDES AND DOCUSATE SODIUM 2 TABLET: 8.6; 5 TABLET ORAL at 17:00

## 2019-05-09 RX ADMIN — HEPARIN SODIUM 5000 UNITS: 5000 INJECTION INTRAVENOUS; SUBCUTANEOUS at 21:01

## 2019-05-09 RX ADMIN — AMIODARONE HYDROCHLORIDE 100 MG: 200 TABLET ORAL at 08:28

## 2019-05-09 RX ADMIN — SUCRALFATE 1000 MG: 1 SUSPENSION ORAL at 17:00

## 2019-05-09 RX ADMIN — FAMOTIDINE 20 MG: 20 TABLET ORAL at 08:28

## 2019-05-09 RX ADMIN — HEPARIN SODIUM 5000 UNITS: 5000 INJECTION INTRAVENOUS; SUBCUTANEOUS at 05:08

## 2019-05-09 RX ADMIN — ASPIRIN 81 MG 81 MG: 81 TABLET ORAL at 08:28

## 2019-05-09 RX ADMIN — HYDROCODONE BITARTRATE AND ACETAMINOPHEN 1 TABLET: 5; 325 TABLET ORAL at 11:16

## 2019-05-09 RX ADMIN — DORZOLAMIDE HYDROCHLORIDE AND TIMOLOL MALEATE 1 DROP: 20; 5 SOLUTION/ DROPS OPHTHALMIC at 08:29

## 2019-05-09 RX ADMIN — ONDANSETRON 4 MG: 2 INJECTION INTRAMUSCULAR; INTRAVENOUS at 08:25

## 2019-05-09 RX ADMIN — SODIUM CHLORIDE TAB 1 GM 3 G: 1 TAB at 16:52

## 2019-05-09 RX ADMIN — DORZOLAMIDE HYDROCHLORIDE AND TIMOLOL MALEATE 1 DROP: 20; 5 SOLUTION/ DROPS OPHTHALMIC at 17:00

## 2019-05-09 RX ADMIN — TRAZODONE HYDROCHLORIDE 50 MG: 50 TABLET ORAL at 21:01

## 2019-05-09 RX ADMIN — SODIUM CHLORIDE TAB 1 GM 2 G: 1 TAB at 08:29

## 2019-05-09 RX ADMIN — ALPRAZOLAM 0.25 MG: 0.25 TABLET ORAL at 20:57

## 2019-05-09 RX ADMIN — PANTOPRAZOLE SODIUM 40 MG: 40 TABLET, DELAYED RELEASE ORAL at 08:28

## 2019-05-09 RX ADMIN — SODIUM CHLORIDE TAB 1 GM 2 G: 1 TAB at 11:16

## 2019-05-09 RX ADMIN — GABAPENTIN 100 MG: 100 CAPSULE ORAL at 17:00

## 2019-05-09 RX ADMIN — ONDANSETRON 4 MG: 2 INJECTION INTRAMUSCULAR; INTRAVENOUS at 17:49

## 2019-05-09 RX ADMIN — PANTOPRAZOLE SODIUM 40 MG: 40 TABLET, DELAYED RELEASE ORAL at 16:52

## 2019-05-09 RX ADMIN — DICYCLOMINE HYDROCHLORIDE 20 MG: 20 TABLET ORAL at 08:30

## 2019-05-09 RX ADMIN — HYDRALAZINE HYDROCHLORIDE 10 MG: 20 INJECTION INTRAMUSCULAR; INTRAVENOUS at 11:13

## 2019-05-09 RX ADMIN — HYDROCODONE BITARTRATE AND ACETAMINOPHEN 1 TABLET: 5; 325 TABLET ORAL at 05:08

## 2019-05-09 RX ADMIN — ROPINIROLE HYDROCHLORIDE 2 MG: 1 TABLET, FILM COATED ORAL at 21:01

## 2019-05-09 RX ADMIN — GABAPENTIN 100 MG: 100 CAPSULE ORAL at 08:28

## 2019-05-09 RX ADMIN — SUCRALFATE 1000 MG: 1 SUSPENSION ORAL at 14:54

## 2019-05-09 RX ADMIN — POLYETHYLENE GLYCOL 3350 17 G: 17 POWDER, FOR SOLUTION ORAL at 14:54

## 2019-05-10 ENCOUNTER — APPOINTMENT (INPATIENT)
Dept: RADIOLOGY | Facility: HOSPITAL | Age: 84
DRG: 305 | End: 2019-05-10
Payer: MEDICARE

## 2019-05-10 ENCOUNTER — APPOINTMENT (INPATIENT)
Dept: CT IMAGING | Facility: HOSPITAL | Age: 84
DRG: 305 | End: 2019-05-10
Payer: MEDICARE

## 2019-05-10 LAB
ANION GAP SERPL CALCULATED.3IONS-SCNC: 7 MMOL/L (ref 4–13)
BASOPHILS # BLD AUTO: 0.05 THOUSANDS/ΜL (ref 0–0.1)
BASOPHILS NFR BLD AUTO: 1 % (ref 0–1)
BUN SERPL-MCNC: 17 MG/DL (ref 5–25)
CALCIUM SERPL-MCNC: 8.4 MG/DL (ref 8.3–10.1)
CHLORIDE SERPL-SCNC: 101 MMOL/L (ref 100–108)
CO2 SERPL-SCNC: 26 MMOL/L (ref 21–32)
CREAT SERPL-MCNC: 1.13 MG/DL (ref 0.6–1.3)
EOSINOPHIL # BLD AUTO: 0.04 THOUSAND/ΜL (ref 0–0.61)
EOSINOPHIL NFR BLD AUTO: 1 % (ref 0–6)
ERYTHROCYTE [DISTWIDTH] IN BLOOD BY AUTOMATED COUNT: 15.4 % (ref 11.6–15.1)
GFR SERPL CREATININE-BSD FRML MDRD: 42 ML/MIN/1.73SQ M
GLUCOSE SERPL-MCNC: 87 MG/DL (ref 65–140)
HCT VFR BLD AUTO: 35.3 % (ref 34.8–46.1)
HGB BLD-MCNC: 11.4 G/DL (ref 11.5–15.4)
IMM GRANULOCYTES # BLD AUTO: 0.03 THOUSAND/UL (ref 0–0.2)
IMM GRANULOCYTES NFR BLD AUTO: 0 % (ref 0–2)
LYMPHOCYTES # BLD AUTO: 2.71 THOUSANDS/ΜL (ref 0.6–4.47)
LYMPHOCYTES NFR BLD AUTO: 37 % (ref 14–44)
MAGNESIUM SERPL-MCNC: 2 MG/DL (ref 1.6–2.6)
MCH RBC QN AUTO: 32.6 PG (ref 26.8–34.3)
MCHC RBC AUTO-ENTMCNC: 32.3 G/DL (ref 31.4–37.4)
MCV RBC AUTO: 101 FL (ref 82–98)
MONOCYTES # BLD AUTO: 0.58 THOUSAND/ΜL (ref 0.17–1.22)
MONOCYTES NFR BLD AUTO: 8 % (ref 4–12)
NEUTROPHILS # BLD AUTO: 3.94 THOUSANDS/ΜL (ref 1.85–7.62)
NEUTS SEG NFR BLD AUTO: 53 % (ref 43–75)
NRBC BLD AUTO-RTO: 0 /100 WBCS
PLATELET # BLD AUTO: 202 THOUSANDS/UL (ref 149–390)
PMV BLD AUTO: 8.6 FL (ref 8.9–12.7)
POTASSIUM SERPL-SCNC: 4 MMOL/L (ref 3.5–5.3)
RBC # BLD AUTO: 3.5 MILLION/UL (ref 3.81–5.12)
SODIUM SERPL-SCNC: 134 MMOL/L (ref 136–145)
WBC # BLD AUTO: 7.35 THOUSAND/UL (ref 4.31–10.16)

## 2019-05-10 PROCEDURE — 74177 CT ABD & PELVIS W/CONTRAST: CPT

## 2019-05-10 PROCEDURE — 71045 X-RAY EXAM CHEST 1 VIEW: CPT

## 2019-05-10 PROCEDURE — 87338 HPYLORI STOOL AG IA: CPT | Performed by: PHYSICIAN ASSISTANT

## 2019-05-10 PROCEDURE — 99232 SBSQ HOSP IP/OBS MODERATE 35: CPT | Performed by: HOSPITALIST

## 2019-05-10 PROCEDURE — 99231 SBSQ HOSP IP/OBS SF/LOW 25: CPT | Performed by: INTERNAL MEDICINE

## 2019-05-10 PROCEDURE — 99232 SBSQ HOSP IP/OBS MODERATE 35: CPT | Performed by: INTERNAL MEDICINE

## 2019-05-10 PROCEDURE — 83735 ASSAY OF MAGNESIUM: CPT | Performed by: HOSPITALIST

## 2019-05-10 PROCEDURE — 80048 BASIC METABOLIC PNL TOTAL CA: CPT | Performed by: PHYSICIAN ASSISTANT

## 2019-05-10 PROCEDURE — 85025 COMPLETE CBC W/AUTO DIFF WBC: CPT | Performed by: PHYSICIAN ASSISTANT

## 2019-05-10 RX ORDER — METOCLOPRAMIDE HYDROCHLORIDE 5 MG/ML
5 INJECTION INTRAMUSCULAR; INTRAVENOUS EVERY 6 HOURS PRN
Status: DISCONTINUED | OUTPATIENT
Start: 2019-05-10 | End: 2019-05-12 | Stop reason: HOSPADM

## 2019-05-10 RX ORDER — AMLODIPINE BESYLATE 5 MG/1
5 TABLET ORAL DAILY
Status: DISCONTINUED | OUTPATIENT
Start: 2019-05-11 | End: 2019-05-11

## 2019-05-10 RX ORDER — AMLODIPINE BESYLATE 5 MG/1
10 TABLET ORAL DAILY
Status: DISCONTINUED | OUTPATIENT
Start: 2019-05-10 | End: 2019-05-10

## 2019-05-10 RX ORDER — AMLODIPINE BESYLATE 5 MG/1
5 TABLET ORAL DAILY
Status: DISCONTINUED | OUTPATIENT
Start: 2019-05-10 | End: 2019-05-10

## 2019-05-10 RX ORDER — POLYETHYLENE GLYCOL 3350 17 G/17G
17 POWDER, FOR SOLUTION ORAL 2 TIMES DAILY
Status: DISCONTINUED | OUTPATIENT
Start: 2019-05-10 | End: 2019-05-12 | Stop reason: HOSPADM

## 2019-05-10 RX ADMIN — SENNOSIDES AND DOCUSATE SODIUM 2 TABLET: 8.6; 5 TABLET ORAL at 08:30

## 2019-05-10 RX ADMIN — GABAPENTIN 100 MG: 100 CAPSULE ORAL at 17:11

## 2019-05-10 RX ADMIN — PANTOPRAZOLE SODIUM 40 MG: 40 TABLET, DELAYED RELEASE ORAL at 17:10

## 2019-05-10 RX ADMIN — HYDRALAZINE HYDROCHLORIDE 10 MG: 20 INJECTION INTRAMUSCULAR; INTRAVENOUS at 19:32

## 2019-05-10 RX ADMIN — PANTOPRAZOLE SODIUM 40 MG: 40 TABLET, DELAYED RELEASE ORAL at 08:29

## 2019-05-10 RX ADMIN — AMLODIPINE BESYLATE 10 MG: 5 TABLET ORAL at 08:34

## 2019-05-10 RX ADMIN — HEPARIN SODIUM 5000 UNITS: 5000 INJECTION INTRAVENOUS; SUBCUTANEOUS at 06:00

## 2019-05-10 RX ADMIN — SODIUM CHLORIDE TAB 1 GM 3 G: 1 TAB at 17:11

## 2019-05-10 RX ADMIN — HEPARIN SODIUM 5000 UNITS: 5000 INJECTION INTRAVENOUS; SUBCUTANEOUS at 13:50

## 2019-05-10 RX ADMIN — POLYETHYLENE GLYCOL 3350 17 G: 17 POWDER, FOR SOLUTION ORAL at 08:30

## 2019-05-10 RX ADMIN — SIMETHICONE CHEW TAB 80 MG 80 MG: 80 TABLET ORAL at 20:47

## 2019-05-10 RX ADMIN — SUCRALFATE 1000 MG: 1 SUSPENSION ORAL at 06:00

## 2019-05-10 RX ADMIN — SODIUM CHLORIDE TAB 1 GM 3 G: 1 TAB at 12:48

## 2019-05-10 RX ADMIN — SUCRALFATE 1000 MG: 1 SUSPENSION ORAL at 00:23

## 2019-05-10 RX ADMIN — SUCRALFATE 1000 MG: 1 SUSPENSION ORAL at 12:49

## 2019-05-10 RX ADMIN — SUCRALFATE 1000 MG: 1 SUSPENSION ORAL at 17:10

## 2019-05-10 RX ADMIN — DORZOLAMIDE HYDROCHLORIDE AND TIMOLOL MALEATE 1 DROP: 20; 5 SOLUTION/ DROPS OPHTHALMIC at 17:11

## 2019-05-10 RX ADMIN — SENNOSIDES AND DOCUSATE SODIUM 2 TABLET: 8.6; 5 TABLET ORAL at 17:10

## 2019-05-10 RX ADMIN — AMIODARONE HYDROCHLORIDE 100 MG: 200 TABLET ORAL at 08:29

## 2019-05-10 RX ADMIN — POLYVINYL ALCOHOL 1 DROP: 14 SOLUTION/ DROPS OPHTHALMIC at 08:41

## 2019-05-10 RX ADMIN — METOCLOPRAMIDE 5 MG: 5 INJECTION, SOLUTION INTRAMUSCULAR; INTRAVENOUS at 18:16

## 2019-05-10 RX ADMIN — ALPRAZOLAM 0.25 MG: 0.25 TABLET ORAL at 21:24

## 2019-05-10 RX ADMIN — DORZOLAMIDE HYDROCHLORIDE AND TIMOLOL MALEATE 1 DROP: 20; 5 SOLUTION/ DROPS OPHTHALMIC at 08:31

## 2019-05-10 RX ADMIN — HEPARIN SODIUM 5000 UNITS: 5000 INJECTION INTRAVENOUS; SUBCUTANEOUS at 21:22

## 2019-05-10 RX ADMIN — POLYVINYL ALCOHOL 1 DROP: 14 SOLUTION/ DROPS OPHTHALMIC at 20:14

## 2019-05-10 RX ADMIN — ASPIRIN 81 MG 81 MG: 81 TABLET ORAL at 08:29

## 2019-05-10 RX ADMIN — IODIXANOL 100 ML: 320 INJECTION, SOLUTION INTRAVASCULAR at 20:00

## 2019-05-10 RX ADMIN — TRAZODONE HYDROCHLORIDE 50 MG: 50 TABLET ORAL at 21:22

## 2019-05-10 RX ADMIN — GABAPENTIN 100 MG: 100 CAPSULE ORAL at 08:29

## 2019-05-10 RX ADMIN — ROPINIROLE HYDROCHLORIDE 2 MG: 1 TABLET, FILM COATED ORAL at 21:22

## 2019-05-10 RX ADMIN — SODIUM CHLORIDE TAB 1 GM 3 G: 1 TAB at 09:58

## 2019-05-10 RX ADMIN — FAMOTIDINE 20 MG: 20 TABLET ORAL at 08:30

## 2019-05-10 RX ADMIN — ACETAMINOPHEN 650 MG: 325 TABLET, FILM COATED ORAL at 04:26

## 2019-05-10 RX ADMIN — POLYETHYLENE GLYCOL 3350 17 G: 17 POWDER, FOR SOLUTION ORAL at 17:10

## 2019-05-10 RX ADMIN — BISACODYL 10 MG: 10 SUPPOSITORY RECTAL at 13:50

## 2019-05-11 LAB
ANION GAP SERPL CALCULATED.3IONS-SCNC: 10 MMOL/L (ref 4–13)
BUN SERPL-MCNC: 14 MG/DL (ref 5–25)
CALCIUM SERPL-MCNC: 8.6 MG/DL (ref 8.3–10.1)
CHLORIDE SERPL-SCNC: 99 MMOL/L (ref 100–108)
CO2 SERPL-SCNC: 26 MMOL/L (ref 21–32)
CREAT SERPL-MCNC: 0.96 MG/DL (ref 0.6–1.3)
GFR SERPL CREATININE-BSD FRML MDRD: 52 ML/MIN/1.73SQ M
GLUCOSE SERPL-MCNC: 91 MG/DL (ref 65–140)
H PYLORI AG STL QL IA: NEGATIVE
POTASSIUM SERPL-SCNC: 3.8 MMOL/L (ref 3.5–5.3)
SODIUM SERPL-SCNC: 135 MMOL/L (ref 136–145)

## 2019-05-11 PROCEDURE — 99232 SBSQ HOSP IP/OBS MODERATE 35: CPT | Performed by: INTERNAL MEDICINE

## 2019-05-11 PROCEDURE — 99232 SBSQ HOSP IP/OBS MODERATE 35: CPT | Performed by: HOSPITALIST

## 2019-05-11 PROCEDURE — 80048 BASIC METABOLIC PNL TOTAL CA: CPT | Performed by: NURSE PRACTITIONER

## 2019-05-11 RX ORDER — POTASSIUM CHLORIDE 20 MEQ/1
20 TABLET, EXTENDED RELEASE ORAL ONCE
Status: COMPLETED | OUTPATIENT
Start: 2019-05-11 | End: 2019-05-11

## 2019-05-11 RX ORDER — AMLODIPINE BESYLATE 5 MG/1
5 TABLET ORAL 2 TIMES DAILY
Status: DISCONTINUED | OUTPATIENT
Start: 2019-05-11 | End: 2019-05-12 | Stop reason: HOSPADM

## 2019-05-11 RX ADMIN — SUCRALFATE 1000 MG: 1 SUSPENSION ORAL at 17:10

## 2019-05-11 RX ADMIN — FAMOTIDINE 20 MG: 20 TABLET ORAL at 08:00

## 2019-05-11 RX ADMIN — METOCLOPRAMIDE 5 MG: 5 INJECTION, SOLUTION INTRAMUSCULAR; INTRAVENOUS at 08:26

## 2019-05-11 RX ADMIN — ASPIRIN 81 MG 81 MG: 81 TABLET ORAL at 08:00

## 2019-05-11 RX ADMIN — HEPARIN SODIUM 5000 UNITS: 5000 INJECTION INTRAVENOUS; SUBCUTANEOUS at 05:17

## 2019-05-11 RX ADMIN — SODIUM CHLORIDE TAB 1 GM 3 G: 1 TAB at 11:57

## 2019-05-11 RX ADMIN — DORZOLAMIDE HYDROCHLORIDE AND TIMOLOL MALEATE 1 DROP: 20; 5 SOLUTION/ DROPS OPHTHALMIC at 17:15

## 2019-05-11 RX ADMIN — GABAPENTIN 100 MG: 100 CAPSULE ORAL at 08:00

## 2019-05-11 RX ADMIN — SENNOSIDES AND DOCUSATE SODIUM 2 TABLET: 8.6; 5 TABLET ORAL at 08:00

## 2019-05-11 RX ADMIN — ACETAMINOPHEN 650 MG: 325 TABLET, FILM COATED ORAL at 08:23

## 2019-05-11 RX ADMIN — ALPRAZOLAM 0.25 MG: 0.25 TABLET ORAL at 21:21

## 2019-05-11 RX ADMIN — PANTOPRAZOLE SODIUM 40 MG: 40 TABLET, DELAYED RELEASE ORAL at 17:00

## 2019-05-11 RX ADMIN — POLYETHYLENE GLYCOL 3350 17 G: 17 POWDER, FOR SOLUTION ORAL at 17:12

## 2019-05-11 RX ADMIN — SODIUM CHLORIDE TAB 1 GM 3 G: 1 TAB at 07:57

## 2019-05-11 RX ADMIN — GABAPENTIN 100 MG: 100 CAPSULE ORAL at 17:10

## 2019-05-11 RX ADMIN — AMLODIPINE BESYLATE 5 MG: 5 TABLET ORAL at 17:10

## 2019-05-11 RX ADMIN — POTASSIUM CHLORIDE 20 MEQ: 1500 TABLET, EXTENDED RELEASE ORAL at 06:34

## 2019-05-11 RX ADMIN — POLYETHYLENE GLYCOL 3350 17 G: 17 POWDER, FOR SOLUTION ORAL at 08:00

## 2019-05-11 RX ADMIN — SODIUM CHLORIDE TAB 1 GM 3 G: 1 TAB at 17:00

## 2019-05-11 RX ADMIN — POLYVINYL ALCOHOL 1 DROP: 14 SOLUTION/ DROPS OPHTHALMIC at 21:21

## 2019-05-11 RX ADMIN — HEPARIN SODIUM 5000 UNITS: 5000 INJECTION INTRAVENOUS; SUBCUTANEOUS at 21:21

## 2019-05-11 RX ADMIN — HEPARIN SODIUM 5000 UNITS: 5000 INJECTION INTRAVENOUS; SUBCUTANEOUS at 14:09

## 2019-05-11 RX ADMIN — SENNOSIDES AND DOCUSATE SODIUM 2 TABLET: 8.6; 5 TABLET ORAL at 17:09

## 2019-05-11 RX ADMIN — SUCRALFATE 1000 MG: 1 SUSPENSION ORAL at 05:17

## 2019-05-11 RX ADMIN — DORZOLAMIDE HYDROCHLORIDE AND TIMOLOL MALEATE 1 DROP: 20; 5 SOLUTION/ DROPS OPHTHALMIC at 08:06

## 2019-05-11 RX ADMIN — SUCRALFATE 1000 MG: 1 SUSPENSION ORAL at 11:57

## 2019-05-11 RX ADMIN — PANTOPRAZOLE SODIUM 40 MG: 40 TABLET, DELAYED RELEASE ORAL at 07:57

## 2019-05-11 RX ADMIN — TRAZODONE HYDROCHLORIDE 50 MG: 50 TABLET ORAL at 21:21

## 2019-05-11 RX ADMIN — ROPINIROLE HYDROCHLORIDE 2 MG: 1 TABLET, FILM COATED ORAL at 21:21

## 2019-05-11 RX ADMIN — AMLODIPINE BESYLATE 5 MG: 5 TABLET ORAL at 08:06

## 2019-05-11 RX ADMIN — AMIODARONE HYDROCHLORIDE 100 MG: 200 TABLET ORAL at 08:00

## 2019-05-12 VITALS
BODY MASS INDEX: 28.98 KG/M2 | WEIGHT: 143.74 LBS | OXYGEN SATURATION: 97 % | TEMPERATURE: 98.4 F | HEART RATE: 86 BPM | DIASTOLIC BLOOD PRESSURE: 70 MMHG | HEIGHT: 59 IN | SYSTOLIC BLOOD PRESSURE: 151 MMHG | RESPIRATION RATE: 20 BRPM

## 2019-05-12 LAB
ANION GAP SERPL CALCULATED.3IONS-SCNC: 9 MMOL/L (ref 4–13)
BUN SERPL-MCNC: 14 MG/DL (ref 5–25)
CALCIUM SERPL-MCNC: 8.5 MG/DL (ref 8.3–10.1)
CHLORIDE SERPL-SCNC: 103 MMOL/L (ref 100–108)
CO2 SERPL-SCNC: 24 MMOL/L (ref 21–32)
CREAT SERPL-MCNC: 0.92 MG/DL (ref 0.6–1.3)
GFR SERPL CREATININE-BSD FRML MDRD: 54 ML/MIN/1.73SQ M
GLUCOSE SERPL-MCNC: 89 MG/DL (ref 65–140)
POTASSIUM SERPL-SCNC: 3.8 MMOL/L (ref 3.5–5.3)
SODIUM SERPL-SCNC: 136 MMOL/L (ref 136–145)

## 2019-05-12 PROCEDURE — 80048 BASIC METABOLIC PNL TOTAL CA: CPT | Performed by: INTERNAL MEDICINE

## 2019-05-12 PROCEDURE — 97116 GAIT TRAINING THERAPY: CPT

## 2019-05-12 PROCEDURE — 97110 THERAPEUTIC EXERCISES: CPT

## 2019-05-12 PROCEDURE — 97530 THERAPEUTIC ACTIVITIES: CPT

## 2019-05-12 PROCEDURE — 99239 HOSP IP/OBS DSCHRG MGMT >30: CPT | Performed by: HOSPITALIST

## 2019-05-12 RX ORDER — PANTOPRAZOLE SODIUM 40 MG/1
40 TABLET, DELAYED RELEASE ORAL
Qty: 60 TABLET | Refills: 0 | Status: SHIPPED | OUTPATIENT
Start: 2019-05-12 | End: 2020-01-30 | Stop reason: HOSPADM

## 2019-05-12 RX ORDER — SODIUM CHLORIDE 1000 MG
3 TABLET, SOLUBLE MISCELLANEOUS
Qty: 270 TABLET | Refills: 0 | Status: SHIPPED | OUTPATIENT
Start: 2019-05-12 | End: 2019-05-20 | Stop reason: HOSPADM

## 2019-05-12 RX ORDER — POLYETHYLENE GLYCOL 3350 17 G/17G
17 POWDER, FOR SOLUTION ORAL 2 TIMES DAILY
Qty: 14 EACH | Refills: 0 | Status: SHIPPED | OUTPATIENT
Start: 2019-05-12 | End: 2019-09-11

## 2019-05-12 RX ORDER — AMLODIPINE BESYLATE 5 MG/1
5 TABLET ORAL 2 TIMES DAILY
Qty: 60 TABLET | Refills: 0 | Status: SHIPPED | OUTPATIENT
Start: 2019-05-12 | End: 2019-05-20 | Stop reason: HOSPADM

## 2019-05-12 RX ORDER — SUCRALFATE ORAL 1 G/10ML
1000 SUSPENSION ORAL EVERY 6 HOURS SCHEDULED
Qty: 420 ML | Refills: 0 | Status: SHIPPED | OUTPATIENT
Start: 2019-05-12 | End: 2019-06-11

## 2019-05-12 RX ORDER — AMOXICILLIN 250 MG
2 CAPSULE ORAL 2 TIMES DAILY
Qty: 120 TABLET | Refills: 0 | Status: SHIPPED | OUTPATIENT
Start: 2019-05-12 | End: 2019-06-11 | Stop reason: SDUPTHER

## 2019-05-12 RX ORDER — HYDROCODONE BITARTRATE AND ACETAMINOPHEN 5; 325 MG/1; MG/1
1 TABLET ORAL EVERY 6 HOURS PRN
Qty: 40 TABLET | Refills: 0
Start: 2019-05-12 | End: 2019-05-22

## 2019-05-12 RX ADMIN — SENNOSIDES AND DOCUSATE SODIUM 2 TABLET: 8.6; 5 TABLET ORAL at 08:29

## 2019-05-12 RX ADMIN — AMIODARONE HYDROCHLORIDE 100 MG: 200 TABLET ORAL at 08:28

## 2019-05-12 RX ADMIN — POLYVINYL ALCOHOL 1 DROP: 14 SOLUTION/ DROPS OPHTHALMIC at 05:12

## 2019-05-12 RX ADMIN — SODIUM CHLORIDE TAB 1 GM 3 G: 1 TAB at 06:26

## 2019-05-12 RX ADMIN — DORZOLAMIDE HYDROCHLORIDE AND TIMOLOL MALEATE 1 DROP: 20; 5 SOLUTION/ DROPS OPHTHALMIC at 08:30

## 2019-05-12 RX ADMIN — POLYETHYLENE GLYCOL 3350 17 G: 17 POWDER, FOR SOLUTION ORAL at 08:29

## 2019-05-12 RX ADMIN — SODIUM CHLORIDE TAB 1 GM 3 G: 1 TAB at 14:05

## 2019-05-12 RX ADMIN — HEPARIN SODIUM 5000 UNITS: 5000 INJECTION INTRAVENOUS; SUBCUTANEOUS at 05:10

## 2019-05-12 RX ADMIN — HYDRALAZINE HYDROCHLORIDE 10 MG: 20 INJECTION INTRAMUSCULAR; INTRAVENOUS at 05:49

## 2019-05-12 RX ADMIN — HEPARIN SODIUM 5000 UNITS: 5000 INJECTION INTRAVENOUS; SUBCUTANEOUS at 14:06

## 2019-05-12 RX ADMIN — SUCRALFATE 1000 MG: 1 SUSPENSION ORAL at 14:45

## 2019-05-12 RX ADMIN — ACETAMINOPHEN 650 MG: 325 TABLET, FILM COATED ORAL at 05:17

## 2019-05-12 RX ADMIN — SODIUM CHLORIDE TAB 1 GM 3 G: 1 TAB at 08:40

## 2019-05-12 RX ADMIN — GABAPENTIN 100 MG: 100 CAPSULE ORAL at 08:29

## 2019-05-12 RX ADMIN — FAMOTIDINE 20 MG: 20 TABLET ORAL at 08:29

## 2019-05-12 RX ADMIN — AMLODIPINE BESYLATE 5 MG: 5 TABLET ORAL at 08:29

## 2019-05-12 RX ADMIN — POLYVINYL ALCOHOL 1 DROP: 14 SOLUTION/ DROPS OPHTHALMIC at 08:37

## 2019-05-12 RX ADMIN — ASPIRIN 81 MG 81 MG: 81 TABLET ORAL at 08:29

## 2019-05-12 RX ADMIN — PANTOPRAZOLE SODIUM 40 MG: 40 TABLET, DELAYED RELEASE ORAL at 06:26

## 2019-05-12 RX ADMIN — SUCRALFATE 1000 MG: 1 SUSPENSION ORAL at 05:10

## 2019-05-13 ENCOUNTER — TRANSITIONAL CARE MANAGEMENT (OUTPATIENT)
Dept: FAMILY MEDICINE CLINIC | Facility: CLINIC | Age: 84
End: 2019-05-13

## 2019-05-13 ENCOUNTER — TELEPHONE (OUTPATIENT)
Dept: FAMILY MEDICINE CLINIC | Facility: CLINIC | Age: 84
End: 2019-05-13

## 2019-05-14 ENCOUNTER — APPOINTMENT (EMERGENCY)
Dept: RADIOLOGY | Facility: HOSPITAL | Age: 84
DRG: 305 | End: 2019-05-14
Payer: MEDICARE

## 2019-05-14 ENCOUNTER — APPOINTMENT (EMERGENCY)
Dept: CT IMAGING | Facility: HOSPITAL | Age: 84
DRG: 305 | End: 2019-05-14
Payer: MEDICARE

## 2019-05-14 ENCOUNTER — HOSPITAL ENCOUNTER (INPATIENT)
Facility: HOSPITAL | Age: 84
LOS: 5 days | Discharge: HOME HEALTH | DRG: 305 | End: 2019-05-20
Attending: EMERGENCY MEDICINE | Admitting: HOSPITALIST
Payer: MEDICARE

## 2019-05-14 DIAGNOSIS — R29.898 WEAKNESS OF EXTREMITY: ICD-10-CM

## 2019-05-14 DIAGNOSIS — R55 POSTURAL DIZZINESS WITH NEAR SYNCOPE: ICD-10-CM

## 2019-05-14 DIAGNOSIS — G89.29 CHRONIC PAIN: ICD-10-CM

## 2019-05-14 DIAGNOSIS — I16.0 HYPERTENSIVE URGENCY: Primary | ICD-10-CM

## 2019-05-14 DIAGNOSIS — M25.411 EFFUSION OF RIGHT SHOULDER JOINT: ICD-10-CM

## 2019-05-14 DIAGNOSIS — G62.9 PERIPHERAL NEUROPATHY: ICD-10-CM

## 2019-05-14 DIAGNOSIS — R42 POSTURAL DIZZINESS WITH NEAR SYNCOPE: ICD-10-CM

## 2019-05-14 LAB
ALBUMIN SERPL BCP-MCNC: 3.3 G/DL (ref 3.5–5)
ALP SERPL-CCNC: 77 U/L (ref 46–116)
ALT SERPL W P-5'-P-CCNC: 68 U/L (ref 12–78)
ANION GAP SERPL CALCULATED.3IONS-SCNC: 11 MMOL/L (ref 4–13)
AST SERPL W P-5'-P-CCNC: 59 U/L (ref 5–45)
ATRIAL RATE: 79 BPM
BASOPHILS # BLD AUTO: 0.04 THOUSANDS/ΜL (ref 0–0.1)
BASOPHILS NFR BLD AUTO: 1 % (ref 0–1)
BILIRUB SERPL-MCNC: 0.5 MG/DL (ref 0.2–1)
BUN SERPL-MCNC: 14 MG/DL (ref 5–25)
CALCIUM SERPL-MCNC: 8.5 MG/DL (ref 8.3–10.1)
CHLORIDE SERPL-SCNC: 99 MMOL/L (ref 100–108)
CO2 SERPL-SCNC: 24 MMOL/L (ref 21–32)
CREAT SERPL-MCNC: 0.9 MG/DL (ref 0.6–1.3)
EOSINOPHIL # BLD AUTO: 0.03 THOUSAND/ΜL (ref 0–0.61)
EOSINOPHIL NFR BLD AUTO: 0 % (ref 0–6)
ERYTHROCYTE [DISTWIDTH] IN BLOOD BY AUTOMATED COUNT: 15.1 % (ref 11.6–15.1)
GFR SERPL CREATININE-BSD FRML MDRD: 56 ML/MIN/1.73SQ M
GLUCOSE SERPL-MCNC: 104 MG/DL (ref 65–140)
HCT VFR BLD AUTO: 37 % (ref 34.8–46.1)
HGB BLD-MCNC: 12.6 G/DL (ref 11.5–15.4)
IMM GRANULOCYTES # BLD AUTO: 0.11 THOUSAND/UL (ref 0–0.2)
IMM GRANULOCYTES NFR BLD AUTO: 1 % (ref 0–2)
LYMPHOCYTES # BLD AUTO: 2.64 THOUSANDS/ΜL (ref 0.6–4.47)
LYMPHOCYTES NFR BLD AUTO: 35 % (ref 14–44)
MCH RBC QN AUTO: 32.1 PG (ref 26.8–34.3)
MCHC RBC AUTO-ENTMCNC: 34.1 G/DL (ref 31.4–37.4)
MCV RBC AUTO: 94 FL (ref 82–98)
MONOCYTES # BLD AUTO: 0.74 THOUSAND/ΜL (ref 0.17–1.22)
MONOCYTES NFR BLD AUTO: 10 % (ref 4–12)
NEUTROPHILS # BLD AUTO: 4.08 THOUSANDS/ΜL (ref 1.85–7.62)
NEUTS SEG NFR BLD AUTO: 53 % (ref 43–75)
NRBC BLD AUTO-RTO: 0 /100 WBCS
NT-PROBNP SERPL-MCNC: 586 PG/ML
P AXIS: 84 DEGREES
PLATELET # BLD AUTO: 313 THOUSANDS/UL (ref 149–390)
PMV BLD AUTO: 8.7 FL (ref 8.9–12.7)
POTASSIUM SERPL-SCNC: 3.9 MMOL/L (ref 3.5–5.3)
PR INTERVAL: 248 MS
PROT SERPL-MCNC: 7.2 G/DL (ref 6.4–8.2)
QRS AXIS: -59 DEGREES
QRSD INTERVAL: 94 MS
QT INTERVAL: 368 MS
QTC INTERVAL: 414 MS
RBC # BLD AUTO: 3.93 MILLION/UL (ref 3.81–5.12)
SODIUM SERPL-SCNC: 134 MMOL/L (ref 136–145)
T WAVE AXIS: 64 DEGREES
TROPONIN I SERPL-MCNC: 0.04 NG/ML
VENTRICULAR RATE: 76 BPM
WBC # BLD AUTO: 7.64 THOUSAND/UL (ref 4.31–10.16)

## 2019-05-14 PROCEDURE — 93005 ELECTROCARDIOGRAM TRACING: CPT

## 2019-05-14 PROCEDURE — 96374 THER/PROPH/DIAG INJ IV PUSH: CPT

## 2019-05-14 PROCEDURE — 71046 X-RAY EXAM CHEST 2 VIEWS: CPT

## 2019-05-14 PROCEDURE — 99285 EMERGENCY DEPT VISIT HI MDM: CPT

## 2019-05-14 PROCEDURE — 85025 COMPLETE CBC W/AUTO DIFF WBC: CPT | Performed by: EMERGENCY MEDICINE

## 2019-05-14 PROCEDURE — 99291 CRITICAL CARE FIRST HOUR: CPT | Performed by: EMERGENCY MEDICINE

## 2019-05-14 PROCEDURE — 80053 COMPREHEN METABOLIC PANEL: CPT | Performed by: EMERGENCY MEDICINE

## 2019-05-14 PROCEDURE — 99219 PR INITIAL OBSERVATION CARE/DAY 50 MINUTES: CPT | Performed by: HOSPITALIST

## 2019-05-14 PROCEDURE — 83880 ASSAY OF NATRIURETIC PEPTIDE: CPT | Performed by: EMERGENCY MEDICINE

## 2019-05-14 PROCEDURE — 70450 CT HEAD/BRAIN W/O DYE: CPT

## 2019-05-14 PROCEDURE — 73030 X-RAY EXAM OF SHOULDER: CPT

## 2019-05-14 PROCEDURE — 93010 ELECTROCARDIOGRAM REPORT: CPT | Performed by: INTERNAL MEDICINE

## 2019-05-14 PROCEDURE — 36415 COLL VENOUS BLD VENIPUNCTURE: CPT

## 2019-05-14 PROCEDURE — 84484 ASSAY OF TROPONIN QUANT: CPT | Performed by: EMERGENCY MEDICINE

## 2019-05-14 RX ORDER — FAMOTIDINE 20 MG/1
20 TABLET, FILM COATED ORAL DAILY
Status: DISCONTINUED | OUTPATIENT
Start: 2019-05-15 | End: 2019-05-20 | Stop reason: HOSPADM

## 2019-05-14 RX ORDER — POLYETHYLENE GLYCOL 3350 17 G/17G
17 POWDER, FOR SOLUTION ORAL 2 TIMES DAILY
Status: DISCONTINUED | OUTPATIENT
Start: 2019-05-14 | End: 2019-05-20 | Stop reason: HOSPADM

## 2019-05-14 RX ORDER — PANTOPRAZOLE SODIUM 40 MG/1
40 TABLET, DELAYED RELEASE ORAL
Status: DISCONTINUED | OUTPATIENT
Start: 2019-05-15 | End: 2019-05-20 | Stop reason: HOSPADM

## 2019-05-14 RX ORDER — SUCRALFATE ORAL 1 G/10ML
1000 SUSPENSION ORAL EVERY 6 HOURS SCHEDULED
Status: DISCONTINUED | OUTPATIENT
Start: 2019-05-14 | End: 2019-05-20 | Stop reason: HOSPADM

## 2019-05-14 RX ORDER — ERYTHROMYCIN 5 MG/G
OINTMENT OPHTHALMIC
Status: DISCONTINUED | OUTPATIENT
Start: 2019-05-14 | End: 2019-05-20 | Stop reason: HOSPADM

## 2019-05-14 RX ORDER — AMIODARONE HYDROCHLORIDE 200 MG/1
100 TABLET ORAL DAILY
Status: DISCONTINUED | OUTPATIENT
Start: 2019-05-15 | End: 2019-05-20 | Stop reason: HOSPADM

## 2019-05-14 RX ORDER — ENALAPRILAT 2.5 MG/2ML
0.62 INJECTION INTRAVENOUS ONCE
Status: COMPLETED | OUTPATIENT
Start: 2019-05-14 | End: 2019-05-14

## 2019-05-14 RX ORDER — AMOXICILLIN 250 MG
2 CAPSULE ORAL 2 TIMES DAILY PRN
Status: DISCONTINUED | OUTPATIENT
Start: 2019-05-14 | End: 2019-05-15

## 2019-05-14 RX ORDER — GABAPENTIN 100 MG/1
100 CAPSULE ORAL 2 TIMES DAILY
Status: DISCONTINUED | OUTPATIENT
Start: 2019-05-14 | End: 2019-05-19

## 2019-05-14 RX ORDER — ALPRAZOLAM 0.25 MG/1
0.25 TABLET ORAL
Status: DISCONTINUED | OUTPATIENT
Start: 2019-05-14 | End: 2019-05-20 | Stop reason: HOSPADM

## 2019-05-14 RX ORDER — LISINOPRIL 10 MG/1
10 TABLET ORAL DAILY
Status: DISCONTINUED | OUTPATIENT
Start: 2019-05-15 | End: 2019-05-15

## 2019-05-14 RX ORDER — ROPINIROLE 1 MG/1
2 TABLET, FILM COATED ORAL
Status: DISCONTINUED | OUTPATIENT
Start: 2019-05-14 | End: 2019-05-20 | Stop reason: HOSPADM

## 2019-05-14 RX ORDER — HYDROCODONE BITARTRATE AND ACETAMINOPHEN 5; 325 MG/1; MG/1
1 TABLET ORAL EVERY 6 HOURS PRN
Status: DISCONTINUED | OUTPATIENT
Start: 2019-05-14 | End: 2019-05-20 | Stop reason: HOSPADM

## 2019-05-14 RX ORDER — HYDRALAZINE HYDROCHLORIDE 20 MG/ML
5 INJECTION INTRAMUSCULAR; INTRAVENOUS EVERY 6 HOURS PRN
Status: DISCONTINUED | OUTPATIENT
Start: 2019-05-14 | End: 2019-05-15

## 2019-05-14 RX ORDER — SODIUM CHLORIDE 1000 MG
3 TABLET, SOLUBLE MISCELLANEOUS
Status: DISCONTINUED | OUTPATIENT
Start: 2019-05-15 | End: 2019-05-19

## 2019-05-14 RX ORDER — DORZOLAMIDE HYDROCHLORIDE AND TIMOLOL MALEATE 20; 5 MG/ML; MG/ML
1 SOLUTION/ DROPS OPHTHALMIC 2 TIMES DAILY
Status: DISCONTINUED | OUTPATIENT
Start: 2019-05-14 | End: 2019-05-20 | Stop reason: HOSPADM

## 2019-05-14 RX ORDER — TRAZODONE HYDROCHLORIDE 50 MG/1
50 TABLET ORAL
Status: DISCONTINUED | OUTPATIENT
Start: 2019-05-14 | End: 2019-05-20 | Stop reason: HOSPADM

## 2019-05-14 RX ORDER — ONDANSETRON 2 MG/ML
4 INJECTION INTRAMUSCULAR; INTRAVENOUS EVERY 6 HOURS PRN
Status: DISCONTINUED | OUTPATIENT
Start: 2019-05-14 | End: 2019-05-20 | Stop reason: HOSPADM

## 2019-05-14 RX ORDER — LISINOPRIL 5 MG/1
10 TABLET ORAL ONCE
Status: COMPLETED | OUTPATIENT
Start: 2019-05-14 | End: 2019-05-14

## 2019-05-14 RX ORDER — HEPARIN SODIUM 5000 [USP'U]/ML
5000 INJECTION, SOLUTION INTRAVENOUS; SUBCUTANEOUS EVERY 8 HOURS SCHEDULED
Status: DISCONTINUED | OUTPATIENT
Start: 2019-05-14 | End: 2019-05-20 | Stop reason: HOSPADM

## 2019-05-14 RX ORDER — ACETAMINOPHEN 325 MG/1
650 TABLET ORAL EVERY 6 HOURS PRN
Status: DISCONTINUED | OUTPATIENT
Start: 2019-05-14 | End: 2019-05-20 | Stop reason: HOSPADM

## 2019-05-14 RX ORDER — LOTEPREDNOL ETABONATE 5 MG/ML
1 SUSPENSION/ DROPS OPHTHALMIC 3 TIMES DAILY
Status: DISCONTINUED | OUTPATIENT
Start: 2019-05-15 | End: 2019-05-20 | Stop reason: HOSPADM

## 2019-05-14 RX ORDER — AMLODIPINE BESYLATE 10 MG/1
10 TABLET ORAL DAILY
Status: DISCONTINUED | OUTPATIENT
Start: 2019-05-15 | End: 2019-05-15

## 2019-05-14 RX ORDER — ASPIRIN 81 MG/1
81 TABLET, CHEWABLE ORAL 2 TIMES DAILY
Status: DISCONTINUED | OUTPATIENT
Start: 2019-05-14 | End: 2019-05-20 | Stop reason: HOSPADM

## 2019-05-14 RX ADMIN — SUCRALFATE 1000 MG: 1 SUSPENSION ORAL at 21:41

## 2019-05-14 RX ADMIN — POLYETHYLENE GLYCOL 3350 17 G: 17 POWDER, FOR SOLUTION ORAL at 21:42

## 2019-05-14 RX ADMIN — TRAZODONE HYDROCHLORIDE 50 MG: 50 TABLET ORAL at 21:42

## 2019-05-14 RX ADMIN — HYDRALAZINE HYDROCHLORIDE 5 MG: 20 INJECTION INTRAMUSCULAR; INTRAVENOUS at 22:38

## 2019-05-14 RX ADMIN — ERYTHROMYCIN: 5 OINTMENT OPHTHALMIC at 21:42

## 2019-05-14 RX ADMIN — DORZOLAMIDE HYDROCHLORIDE AND TIMOLOL MALEATE 1 DROP: 20; 5 SOLUTION/ DROPS OPHTHALMIC at 21:42

## 2019-05-14 RX ADMIN — HEPARIN SODIUM 5000 UNITS: 5000 INJECTION INTRAVENOUS; SUBCUTANEOUS at 21:42

## 2019-05-14 RX ADMIN — GABAPENTIN 100 MG: 100 CAPSULE ORAL at 21:41

## 2019-05-14 RX ADMIN — ASPIRIN 81 MG 81 MG: 81 TABLET ORAL at 21:41

## 2019-05-14 RX ADMIN — ENALAPRILAT 0.62 MG: 1.25 INJECTION INTRAVENOUS at 13:45

## 2019-05-14 RX ADMIN — ROPINIROLE 2 MG: 1 TABLET, FILM COATED ORAL at 21:42

## 2019-05-14 RX ADMIN — SUCRALFATE 1000 MG: 1 SUSPENSION ORAL at 23:59

## 2019-05-14 RX ADMIN — LISINOPRIL 10 MG: 5 TABLET ORAL at 18:58

## 2019-05-15 ENCOUNTER — APPOINTMENT (INPATIENT)
Dept: MRI IMAGING | Facility: HOSPITAL | Age: 84
DRG: 305 | End: 2019-05-15
Payer: MEDICARE

## 2019-05-15 LAB
ATRIAL RATE: 73 BPM
P AXIS: 5 DEGREES
PR INTERVAL: 248 MS
QRS AXIS: -47 DEGREES
QRSD INTERVAL: 92 MS
QT INTERVAL: 406 MS
QTC INTERVAL: 447 MS
T WAVE AXIS: 25 DEGREES
TROPONIN I SERPL-MCNC: <0.02 NG/ML
VENTRICULAR RATE: 73 BPM

## 2019-05-15 PROCEDURE — 99222 1ST HOSP IP/OBS MODERATE 55: CPT | Performed by: PHYSICIAN ASSISTANT

## 2019-05-15 PROCEDURE — G8978 MOBILITY CURRENT STATUS: HCPCS

## 2019-05-15 PROCEDURE — 99232 SBSQ HOSP IP/OBS MODERATE 35: CPT | Performed by: HOSPITALIST

## 2019-05-15 PROCEDURE — G8979 MOBILITY GOAL STATUS: HCPCS

## 2019-05-15 PROCEDURE — 97110 THERAPEUTIC EXERCISES: CPT

## 2019-05-15 PROCEDURE — 93010 ELECTROCARDIOGRAM REPORT: CPT | Performed by: INTERNAL MEDICINE

## 2019-05-15 PROCEDURE — 93005 ELECTROCARDIOGRAM TRACING: CPT

## 2019-05-15 PROCEDURE — 70553 MRI BRAIN STEM W/O & W/DYE: CPT

## 2019-05-15 PROCEDURE — 97163 PT EVAL HIGH COMPLEX 45 MIN: CPT

## 2019-05-15 PROCEDURE — 84484 ASSAY OF TROPONIN QUANT: CPT | Performed by: HOSPITALIST

## 2019-05-15 PROCEDURE — A9585 GADOBUTROL INJECTION: HCPCS | Performed by: HOSPITALIST

## 2019-05-15 PROCEDURE — 97167 OT EVAL HIGH COMPLEX 60 MIN: CPT

## 2019-05-15 PROCEDURE — G8987 SELF CARE CURRENT STATUS: HCPCS

## 2019-05-15 PROCEDURE — G8988 SELF CARE GOAL STATUS: HCPCS

## 2019-05-15 RX ORDER — LISINOPRIL 20 MG/1
20 TABLET ORAL DAILY
Status: DISCONTINUED | OUTPATIENT
Start: 2019-05-16 | End: 2019-05-19

## 2019-05-15 RX ORDER — HYDRALAZINE HYDROCHLORIDE 20 MG/ML
10 INJECTION INTRAMUSCULAR; INTRAVENOUS EVERY 6 HOURS PRN
Status: DISCONTINUED | OUTPATIENT
Start: 2019-05-15 | End: 2019-05-20 | Stop reason: HOSPADM

## 2019-05-15 RX ORDER — LISINOPRIL 10 MG/1
10 TABLET ORAL ONCE
Status: COMPLETED | OUTPATIENT
Start: 2019-05-15 | End: 2019-05-15

## 2019-05-15 RX ORDER — SIMETHICONE 80 MG
80 TABLET,CHEWABLE ORAL EVERY 6 HOURS PRN
Status: DISCONTINUED | OUTPATIENT
Start: 2019-05-15 | End: 2019-05-20 | Stop reason: HOSPADM

## 2019-05-15 RX ORDER — AMOXICILLIN 250 MG
2 CAPSULE ORAL 2 TIMES DAILY
Status: DISCONTINUED | OUTPATIENT
Start: 2019-05-15 | End: 2019-05-20 | Stop reason: HOSPADM

## 2019-05-15 RX ADMIN — HEPARIN SODIUM 5000 UNITS: 5000 INJECTION INTRAVENOUS; SUBCUTANEOUS at 12:35

## 2019-05-15 RX ADMIN — GABAPENTIN 100 MG: 100 CAPSULE ORAL at 08:53

## 2019-05-15 RX ADMIN — HEPARIN SODIUM 5000 UNITS: 5000 INJECTION INTRAVENOUS; SUBCUTANEOUS at 05:33

## 2019-05-15 RX ADMIN — SUCRALFATE 1000 MG: 1 SUSPENSION ORAL at 17:22

## 2019-05-15 RX ADMIN — SUCRALFATE 1000 MG: 1 SUSPENSION ORAL at 05:33

## 2019-05-15 RX ADMIN — POLYETHYLENE GLYCOL 3350 17 G: 17 POWDER, FOR SOLUTION ORAL at 08:55

## 2019-05-15 RX ADMIN — ERYTHROMYCIN: 5 OINTMENT OPHTHALMIC at 21:16

## 2019-05-15 RX ADMIN — LISINOPRIL 10 MG: 10 TABLET ORAL at 08:53

## 2019-05-15 RX ADMIN — HYDRALAZINE HYDROCHLORIDE 10 MG: 20 INJECTION INTRAMUSCULAR; INTRAVENOUS at 15:48

## 2019-05-15 RX ADMIN — PANTOPRAZOLE SODIUM 40 MG: 20 TABLET, DELAYED RELEASE ORAL at 15:47

## 2019-05-15 RX ADMIN — HEPARIN SODIUM 5000 UNITS: 5000 INJECTION INTRAVENOUS; SUBCUTANEOUS at 21:15

## 2019-05-15 RX ADMIN — ONDANSETRON 4 MG: 2 INJECTION INTRAMUSCULAR; INTRAVENOUS at 12:34

## 2019-05-15 RX ADMIN — SENNOSIDES AND DOCUSATE SODIUM 2 TABLET: 8.6; 5 TABLET ORAL at 12:34

## 2019-05-15 RX ADMIN — FAMOTIDINE 20 MG: 20 TABLET ORAL at 08:53

## 2019-05-15 RX ADMIN — PANTOPRAZOLE SODIUM 40 MG: 20 TABLET, DELAYED RELEASE ORAL at 05:33

## 2019-05-15 RX ADMIN — SODIUM CHLORIDE TAB 1 GM 3 G: 1 TAB at 15:47

## 2019-05-15 RX ADMIN — AMLODIPINE BESYLATE 10 MG: 10 TABLET ORAL at 08:53

## 2019-05-15 RX ADMIN — LISINOPRIL 10 MG: 10 TABLET ORAL at 12:35

## 2019-05-15 RX ADMIN — GADOBUTROL 6 ML: 604.72 INJECTION INTRAVENOUS at 14:05

## 2019-05-15 RX ADMIN — DORZOLAMIDE HYDROCHLORIDE AND TIMOLOL MALEATE 1 DROP: 20; 5 SOLUTION/ DROPS OPHTHALMIC at 17:22

## 2019-05-15 RX ADMIN — TRAZODONE HYDROCHLORIDE 50 MG: 50 TABLET ORAL at 21:14

## 2019-05-15 RX ADMIN — ROPINIROLE 2 MG: 1 TABLET, FILM COATED ORAL at 21:14

## 2019-05-15 RX ADMIN — DORZOLAMIDE HYDROCHLORIDE AND TIMOLOL MALEATE 1 DROP: 20; 5 SOLUTION/ DROPS OPHTHALMIC at 08:59

## 2019-05-15 RX ADMIN — POLYETHYLENE GLYCOL 3350 17 G: 17 POWDER, FOR SOLUTION ORAL at 17:22

## 2019-05-15 RX ADMIN — ASPIRIN 81 MG 81 MG: 81 TABLET ORAL at 17:22

## 2019-05-15 RX ADMIN — SIMETHICONE CHEW TAB 80 MG 80 MG: 80 TABLET ORAL at 17:22

## 2019-05-15 RX ADMIN — ASPIRIN 81 MG 81 MG: 81 TABLET ORAL at 08:35

## 2019-05-15 RX ADMIN — SODIUM CHLORIDE TAB 1 GM 3 G: 1 TAB at 08:35

## 2019-05-15 RX ADMIN — AMIODARONE HYDROCHLORIDE 100 MG: 200 TABLET ORAL at 08:36

## 2019-05-15 RX ADMIN — SENNOSIDES AND DOCUSATE SODIUM 2 TABLET: 8.6; 5 TABLET ORAL at 17:22

## 2019-05-15 RX ADMIN — ALPRAZOLAM 0.25 MG: 0.25 TABLET ORAL at 21:14

## 2019-05-15 RX ADMIN — HYDROCODONE BITARTRATE AND ACETAMINOPHEN 1 TABLET: 5; 325 TABLET ORAL at 21:14

## 2019-05-15 RX ADMIN — ACETAMINOPHEN 650 MG: 325 TABLET, FILM COATED ORAL at 17:21

## 2019-05-15 RX ADMIN — SODIUM CHLORIDE TAB 1 GM 3 G: 1 TAB at 12:35

## 2019-05-15 RX ADMIN — SUCRALFATE 1000 MG: 1 SUSPENSION ORAL at 12:35

## 2019-05-15 RX ADMIN — GABAPENTIN 100 MG: 100 CAPSULE ORAL at 17:22

## 2019-05-16 ENCOUNTER — APPOINTMENT (INPATIENT)
Dept: MRI IMAGING | Facility: HOSPITAL | Age: 84
DRG: 305 | End: 2019-05-16
Payer: MEDICARE

## 2019-05-16 LAB
ANION GAP SERPL CALCULATED.3IONS-SCNC: 10 MMOL/L (ref 4–13)
BUN SERPL-MCNC: 19 MG/DL (ref 5–25)
CALCIUM SERPL-MCNC: 8.4 MG/DL (ref 8.3–10.1)
CHLORIDE SERPL-SCNC: 104 MMOL/L (ref 100–108)
CO2 SERPL-SCNC: 23 MMOL/L (ref 21–32)
CREAT SERPL-MCNC: 1.04 MG/DL (ref 0.6–1.3)
ERYTHROCYTE [DISTWIDTH] IN BLOOD BY AUTOMATED COUNT: 15.2 % (ref 11.6–15.1)
GFR SERPL CREATININE-BSD FRML MDRD: 47 ML/MIN/1.73SQ M
GLUCOSE SERPL-MCNC: 82 MG/DL (ref 65–140)
HCT VFR BLD AUTO: 31.4 % (ref 34.8–46.1)
HGB BLD-MCNC: 10.6 G/DL (ref 11.5–15.4)
INR PPP: 0.97 (ref 0.86–1.17)
MCH RBC QN AUTO: 32.1 PG (ref 26.8–34.3)
MCHC RBC AUTO-ENTMCNC: 33.8 G/DL (ref 31.4–37.4)
MCV RBC AUTO: 95 FL (ref 82–98)
PLATELET # BLD AUTO: 281 THOUSANDS/UL (ref 149–390)
PMV BLD AUTO: 8.6 FL (ref 8.9–12.7)
POTASSIUM SERPL-SCNC: 3.6 MMOL/L (ref 3.5–5.3)
PROTHROMBIN TIME: 12.6 SECONDS (ref 11.8–14.2)
RBC # BLD AUTO: 3.3 MILLION/UL (ref 3.81–5.12)
SODIUM SERPL-SCNC: 137 MMOL/L (ref 136–145)
WBC # BLD AUTO: 6.89 THOUSAND/UL (ref 4.31–10.16)

## 2019-05-16 PROCEDURE — 85027 COMPLETE CBC AUTOMATED: CPT | Performed by: HOSPITALIST

## 2019-05-16 PROCEDURE — 85610 PROTHROMBIN TIME: CPT | Performed by: HOSPITALIST

## 2019-05-16 PROCEDURE — A9585 GADOBUTROL INJECTION: HCPCS | Performed by: HOSPITALIST

## 2019-05-16 PROCEDURE — 73223 MRI JOINT UPR EXTR W/O&W/DYE: CPT

## 2019-05-16 PROCEDURE — NC001 PR NO CHARGE: Performed by: PHYSICIAN ASSISTANT

## 2019-05-16 PROCEDURE — 80048 BASIC METABOLIC PNL TOTAL CA: CPT | Performed by: HOSPITALIST

## 2019-05-16 PROCEDURE — 99232 SBSQ HOSP IP/OBS MODERATE 35: CPT | Performed by: HOSPITALIST

## 2019-05-16 RX ADMIN — ERYTHROMYCIN: 5 OINTMENT OPHTHALMIC at 21:35

## 2019-05-16 RX ADMIN — GADOBUTROL 6 ML: 604.72 INJECTION INTRAVENOUS at 14:28

## 2019-05-16 RX ADMIN — SODIUM CHLORIDE TAB 1 GM 3 G: 1 TAB at 16:05

## 2019-05-16 RX ADMIN — ASPIRIN 81 MG 81 MG: 81 TABLET ORAL at 10:17

## 2019-05-16 RX ADMIN — PANTOPRAZOLE SODIUM 40 MG: 20 TABLET, DELAYED RELEASE ORAL at 06:05

## 2019-05-16 RX ADMIN — SUCRALFATE 1000 MG: 1 SUSPENSION ORAL at 12:54

## 2019-05-16 RX ADMIN — HEPARIN SODIUM 5000 UNITS: 5000 INJECTION INTRAVENOUS; SUBCUTANEOUS at 16:06

## 2019-05-16 RX ADMIN — GABAPENTIN 100 MG: 100 CAPSULE ORAL at 18:49

## 2019-05-16 RX ADMIN — SODIUM CHLORIDE TAB 1 GM 3 G: 1 TAB at 10:16

## 2019-05-16 RX ADMIN — POLYETHYLENE GLYCOL 3350 17 G: 17 POWDER, FOR SOLUTION ORAL at 10:17

## 2019-05-16 RX ADMIN — SENNOSIDES AND DOCUSATE SODIUM 2 TABLET: 8.6; 5 TABLET ORAL at 10:17

## 2019-05-16 RX ADMIN — GABAPENTIN 100 MG: 100 CAPSULE ORAL at 10:16

## 2019-05-16 RX ADMIN — AMIODARONE HYDROCHLORIDE 100 MG: 200 TABLET ORAL at 10:16

## 2019-05-16 RX ADMIN — PANTOPRAZOLE SODIUM 40 MG: 20 TABLET, DELAYED RELEASE ORAL at 16:06

## 2019-05-16 RX ADMIN — TRAZODONE HYDROCHLORIDE 50 MG: 50 TABLET ORAL at 21:34

## 2019-05-16 RX ADMIN — SODIUM CHLORIDE TAB 1 GM 3 G: 1 TAB at 12:54

## 2019-05-16 RX ADMIN — LISINOPRIL 20 MG: 20 TABLET ORAL at 10:16

## 2019-05-16 RX ADMIN — DORZOLAMIDE HYDROCHLORIDE AND TIMOLOL MALEATE 1 DROP: 20; 5 SOLUTION/ DROPS OPHTHALMIC at 10:18

## 2019-05-16 RX ADMIN — SUCRALFATE 1000 MG: 1 SUSPENSION ORAL at 06:05

## 2019-05-16 RX ADMIN — HEPARIN SODIUM 5000 UNITS: 5000 INJECTION INTRAVENOUS; SUBCUTANEOUS at 06:05

## 2019-05-16 RX ADMIN — FAMOTIDINE 20 MG: 20 TABLET ORAL at 10:17

## 2019-05-16 RX ADMIN — ROPINIROLE 2 MG: 1 TABLET, FILM COATED ORAL at 21:34

## 2019-05-16 RX ADMIN — ASPIRIN 81 MG 81 MG: 81 TABLET ORAL at 18:49

## 2019-05-16 RX ADMIN — SUCRALFATE 1000 MG: 1 SUSPENSION ORAL at 18:49

## 2019-05-16 RX ADMIN — DORZOLAMIDE HYDROCHLORIDE AND TIMOLOL MALEATE 1 DROP: 20; 5 SOLUTION/ DROPS OPHTHALMIC at 18:49

## 2019-05-17 PROCEDURE — 0R9J3ZZ DRAINAGE OF RIGHT SHOULDER JOINT, PERCUTANEOUS APPROACH: ICD-10-PCS | Performed by: ORTHOPAEDIC SURGERY

## 2019-05-17 PROCEDURE — 99231 SBSQ HOSP IP/OBS SF/LOW 25: CPT | Performed by: PHYSICIAN ASSISTANT

## 2019-05-17 PROCEDURE — 97116 GAIT TRAINING THERAPY: CPT

## 2019-05-17 PROCEDURE — 20610 DRAIN/INJ JOINT/BURSA W/O US: CPT | Performed by: PHYSICIAN ASSISTANT

## 2019-05-17 PROCEDURE — 99232 SBSQ HOSP IP/OBS MODERATE 35: CPT | Performed by: HOSPITALIST

## 2019-05-17 PROCEDURE — 97535 SELF CARE MNGMENT TRAINING: CPT

## 2019-05-17 RX ORDER — MECLIZINE HYDROCHLORIDE 25 MG/1
25 TABLET ORAL EVERY 8 HOURS PRN
Status: DISCONTINUED | OUTPATIENT
Start: 2019-05-17 | End: 2019-05-19

## 2019-05-17 RX ADMIN — SUCRALFATE 1000 MG: 1 SUSPENSION ORAL at 12:22

## 2019-05-17 RX ADMIN — PANTOPRAZOLE SODIUM 40 MG: 20 TABLET, DELAYED RELEASE ORAL at 06:14

## 2019-05-17 RX ADMIN — SODIUM CHLORIDE TAB 1 GM 3 G: 1 TAB at 16:39

## 2019-05-17 RX ADMIN — SUCRALFATE 1000 MG: 1 SUSPENSION ORAL at 18:26

## 2019-05-17 RX ADMIN — HYDRALAZINE HYDROCHLORIDE 10 MG: 20 INJECTION INTRAMUSCULAR; INTRAVENOUS at 22:03

## 2019-05-17 RX ADMIN — AMIODARONE HYDROCHLORIDE 100 MG: 200 TABLET ORAL at 10:08

## 2019-05-17 RX ADMIN — HEPARIN SODIUM 5000 UNITS: 5000 INJECTION INTRAVENOUS; SUBCUTANEOUS at 14:19

## 2019-05-17 RX ADMIN — HEPARIN SODIUM 5000 UNITS: 5000 INJECTION INTRAVENOUS; SUBCUTANEOUS at 06:15

## 2019-05-17 RX ADMIN — POLYETHYLENE GLYCOL 3350 17 G: 17 POWDER, FOR SOLUTION ORAL at 10:06

## 2019-05-17 RX ADMIN — GABAPENTIN 100 MG: 100 CAPSULE ORAL at 10:07

## 2019-05-17 RX ADMIN — LISINOPRIL 20 MG: 20 TABLET ORAL at 10:06

## 2019-05-17 RX ADMIN — ASPIRIN 81 MG 81 MG: 81 TABLET ORAL at 18:26

## 2019-05-17 RX ADMIN — TRAZODONE HYDROCHLORIDE 50 MG: 50 TABLET ORAL at 21:48

## 2019-05-17 RX ADMIN — POLYETHYLENE GLYCOL 3350 17 G: 17 POWDER, FOR SOLUTION ORAL at 18:26

## 2019-05-17 RX ADMIN — SUCRALFATE 1000 MG: 1 SUSPENSION ORAL at 06:14

## 2019-05-17 RX ADMIN — ROPINIROLE 2 MG: 1 TABLET, FILM COATED ORAL at 21:48

## 2019-05-17 RX ADMIN — HEPARIN SODIUM 5000 UNITS: 5000 INJECTION INTRAVENOUS; SUBCUTANEOUS at 21:48

## 2019-05-17 RX ADMIN — DORZOLAMIDE HYDROCHLORIDE AND TIMOLOL MALEATE 1 DROP: 20; 5 SOLUTION/ DROPS OPHTHALMIC at 18:26

## 2019-05-17 RX ADMIN — SENNOSIDES AND DOCUSATE SODIUM 2 TABLET: 8.6; 5 TABLET ORAL at 10:06

## 2019-05-17 RX ADMIN — PANTOPRAZOLE SODIUM 40 MG: 20 TABLET, DELAYED RELEASE ORAL at 16:39

## 2019-05-17 RX ADMIN — ERYTHROMYCIN: 5 OINTMENT OPHTHALMIC at 21:49

## 2019-05-17 RX ADMIN — SODIUM CHLORIDE TAB 1 GM 3 G: 1 TAB at 10:07

## 2019-05-17 RX ADMIN — ASPIRIN 81 MG 81 MG: 81 TABLET ORAL at 10:07

## 2019-05-17 RX ADMIN — SODIUM CHLORIDE TAB 1 GM 3 G: 1 TAB at 12:22

## 2019-05-17 RX ADMIN — SENNOSIDES AND DOCUSATE SODIUM 2 TABLET: 8.6; 5 TABLET ORAL at 18:26

## 2019-05-17 RX ADMIN — DORZOLAMIDE HYDROCHLORIDE AND TIMOLOL MALEATE 1 DROP: 20; 5 SOLUTION/ DROPS OPHTHALMIC at 10:09

## 2019-05-17 RX ADMIN — GABAPENTIN 100 MG: 100 CAPSULE ORAL at 18:26

## 2019-05-17 RX ADMIN — FAMOTIDINE 20 MG: 20 TABLET ORAL at 10:07

## 2019-05-18 PROCEDURE — 99232 SBSQ HOSP IP/OBS MODERATE 35: CPT | Performed by: HOSPITALIST

## 2019-05-18 RX ADMIN — SODIUM CHLORIDE TAB 1 GM 3 G: 1 TAB at 09:31

## 2019-05-18 RX ADMIN — ROPINIROLE 2 MG: 1 TABLET, FILM COATED ORAL at 21:26

## 2019-05-18 RX ADMIN — SUCRALFATE 1000 MG: 1 SUSPENSION ORAL at 04:06

## 2019-05-18 RX ADMIN — GABAPENTIN 100 MG: 100 CAPSULE ORAL at 18:10

## 2019-05-18 RX ADMIN — AMIODARONE HYDROCHLORIDE 100 MG: 200 TABLET ORAL at 09:32

## 2019-05-18 RX ADMIN — HYDRALAZINE HYDROCHLORIDE 10 MG: 20 INJECTION INTRAMUSCULAR; INTRAVENOUS at 09:20

## 2019-05-18 RX ADMIN — MECLIZINE HYDROCHLORIDE 25 MG: 25 TABLET ORAL at 12:47

## 2019-05-18 RX ADMIN — LISINOPRIL 20 MG: 20 TABLET ORAL at 09:31

## 2019-05-18 RX ADMIN — ACETAMINOPHEN 650 MG: 325 TABLET, FILM COATED ORAL at 04:06

## 2019-05-18 RX ADMIN — SUCRALFATE 1000 MG: 1 SUSPENSION ORAL at 18:10

## 2019-05-18 RX ADMIN — TRAZODONE HYDROCHLORIDE 50 MG: 50 TABLET ORAL at 21:26

## 2019-05-18 RX ADMIN — HEPARIN SODIUM 5000 UNITS: 5000 INJECTION INTRAVENOUS; SUBCUTANEOUS at 04:06

## 2019-05-18 RX ADMIN — ERYTHROMYCIN: 5 OINTMENT OPHTHALMIC at 21:26

## 2019-05-18 RX ADMIN — ASPIRIN 81 MG 81 MG: 81 TABLET ORAL at 09:32

## 2019-05-18 RX ADMIN — HEPARIN SODIUM 5000 UNITS: 5000 INJECTION INTRAVENOUS; SUBCUTANEOUS at 21:26

## 2019-05-18 RX ADMIN — FAMOTIDINE 20 MG: 20 TABLET ORAL at 09:31

## 2019-05-18 RX ADMIN — ASPIRIN 81 MG 81 MG: 81 TABLET ORAL at 18:10

## 2019-05-18 RX ADMIN — HEPARIN SODIUM 5000 UNITS: 5000 INJECTION INTRAVENOUS; SUBCUTANEOUS at 13:03

## 2019-05-18 RX ADMIN — SENNOSIDES AND DOCUSATE SODIUM 2 TABLET: 8.6; 5 TABLET ORAL at 09:31

## 2019-05-18 RX ADMIN — DORZOLAMIDE HYDROCHLORIDE AND TIMOLOL MALEATE 1 DROP: 20; 5 SOLUTION/ DROPS OPHTHALMIC at 18:11

## 2019-05-18 RX ADMIN — GABAPENTIN 100 MG: 100 CAPSULE ORAL at 09:32

## 2019-05-18 RX ADMIN — PANTOPRAZOLE SODIUM 40 MG: 20 TABLET, DELAYED RELEASE ORAL at 18:10

## 2019-05-18 RX ADMIN — PANTOPRAZOLE SODIUM 40 MG: 20 TABLET, DELAYED RELEASE ORAL at 04:06

## 2019-05-18 RX ADMIN — SUCRALFATE 1000 MG: 1 SUSPENSION ORAL at 12:47

## 2019-05-18 RX ADMIN — SODIUM CHLORIDE TAB 1 GM 3 G: 1 TAB at 12:47

## 2019-05-18 RX ADMIN — DORZOLAMIDE HYDROCHLORIDE AND TIMOLOL MALEATE 1 DROP: 20; 5 SOLUTION/ DROPS OPHTHALMIC at 09:32

## 2019-05-19 LAB
ANION GAP SERPL CALCULATED.3IONS-SCNC: 9 MMOL/L (ref 4–13)
BUN SERPL-MCNC: 12 MG/DL (ref 5–25)
CALCIUM SERPL-MCNC: 8.3 MG/DL (ref 8.3–10.1)
CHLORIDE SERPL-SCNC: 103 MMOL/L (ref 100–108)
CO2 SERPL-SCNC: 24 MMOL/L (ref 21–32)
CREAT SERPL-MCNC: 0.86 MG/DL (ref 0.6–1.3)
ERYTHROCYTE [DISTWIDTH] IN BLOOD BY AUTOMATED COUNT: 15.3 % (ref 11.6–15.1)
GFR SERPL CREATININE-BSD FRML MDRD: 59 ML/MIN/1.73SQ M
GLUCOSE SERPL-MCNC: 81 MG/DL (ref 65–140)
HCT VFR BLD AUTO: 30.2 % (ref 34.8–46.1)
HGB BLD-MCNC: 10.3 G/DL (ref 11.5–15.4)
MCH RBC QN AUTO: 32 PG (ref 26.8–34.3)
MCHC RBC AUTO-ENTMCNC: 34.1 G/DL (ref 31.4–37.4)
MCV RBC AUTO: 94 FL (ref 82–98)
PLATELET # BLD AUTO: 280 THOUSANDS/UL (ref 149–390)
PMV BLD AUTO: 8.4 FL (ref 8.9–12.7)
POTASSIUM SERPL-SCNC: 3.6 MMOL/L (ref 3.5–5.3)
RBC # BLD AUTO: 3.22 MILLION/UL (ref 3.81–5.12)
SODIUM SERPL-SCNC: 136 MMOL/L (ref 136–145)
WBC # BLD AUTO: 5.73 THOUSAND/UL (ref 4.31–10.16)

## 2019-05-19 PROCEDURE — 85027 COMPLETE CBC AUTOMATED: CPT | Performed by: HOSPITALIST

## 2019-05-19 PROCEDURE — 80048 BASIC METABOLIC PNL TOTAL CA: CPT | Performed by: HOSPITALIST

## 2019-05-19 PROCEDURE — 99232 SBSQ HOSP IP/OBS MODERATE 35: CPT | Performed by: HOSPITALIST

## 2019-05-19 RX ORDER — LISINOPRIL 20 MG/1
20 TABLET ORAL ONCE
Status: COMPLETED | OUTPATIENT
Start: 2019-05-19 | End: 2019-05-19

## 2019-05-19 RX ORDER — MECLIZINE HYDROCHLORIDE 25 MG/1
25 TABLET ORAL EVERY 8 HOURS SCHEDULED
Status: DISCONTINUED | OUTPATIENT
Start: 2019-05-19 | End: 2019-05-20 | Stop reason: HOSPADM

## 2019-05-19 RX ORDER — LISINOPRIL 20 MG/1
40 TABLET ORAL DAILY
Status: DISCONTINUED | OUTPATIENT
Start: 2019-05-20 | End: 2019-05-20 | Stop reason: HOSPADM

## 2019-05-19 RX ORDER — GABAPENTIN 100 MG/1
100 CAPSULE ORAL 3 TIMES DAILY
Status: DISCONTINUED | OUTPATIENT
Start: 2019-05-19 | End: 2019-05-20 | Stop reason: HOSPADM

## 2019-05-19 RX ADMIN — PANTOPRAZOLE SODIUM 40 MG: 20 TABLET, DELAYED RELEASE ORAL at 07:11

## 2019-05-19 RX ADMIN — HEPARIN SODIUM 5000 UNITS: 5000 INJECTION INTRAVENOUS; SUBCUTANEOUS at 14:09

## 2019-05-19 RX ADMIN — GABAPENTIN 100 MG: 100 CAPSULE ORAL at 08:07

## 2019-05-19 RX ADMIN — PANTOPRAZOLE SODIUM 40 MG: 20 TABLET, DELAYED RELEASE ORAL at 15:48

## 2019-05-19 RX ADMIN — LISINOPRIL 20 MG: 20 TABLET ORAL at 08:07

## 2019-05-19 RX ADMIN — GABAPENTIN 100 MG: 100 CAPSULE ORAL at 21:20

## 2019-05-19 RX ADMIN — SUCRALFATE 1000 MG: 1 SUSPENSION ORAL at 17:42

## 2019-05-19 RX ADMIN — SUCRALFATE 1000 MG: 1 SUSPENSION ORAL at 00:10

## 2019-05-19 RX ADMIN — POLYETHYLENE GLYCOL 3350 17 G: 17 POWDER, FOR SOLUTION ORAL at 08:06

## 2019-05-19 RX ADMIN — LISINOPRIL 20 MG: 20 TABLET ORAL at 12:07

## 2019-05-19 RX ADMIN — ASPIRIN 81 MG 81 MG: 81 TABLET ORAL at 17:42

## 2019-05-19 RX ADMIN — FAMOTIDINE 20 MG: 20 TABLET ORAL at 08:07

## 2019-05-19 RX ADMIN — ASPIRIN 81 MG 81 MG: 81 TABLET ORAL at 08:07

## 2019-05-19 RX ADMIN — MECLIZINE HYDROCHLORIDE 25 MG: 25 TABLET ORAL at 21:20

## 2019-05-19 RX ADMIN — TRAZODONE HYDROCHLORIDE 50 MG: 50 TABLET ORAL at 21:20

## 2019-05-19 RX ADMIN — GABAPENTIN 100 MG: 100 CAPSULE ORAL at 15:48

## 2019-05-19 RX ADMIN — POLYETHYLENE GLYCOL 3350 17 G: 17 POWDER, FOR SOLUTION ORAL at 17:44

## 2019-05-19 RX ADMIN — MECLIZINE HYDROCHLORIDE 25 MG: 25 TABLET ORAL at 14:08

## 2019-05-19 RX ADMIN — AMIODARONE HYDROCHLORIDE 100 MG: 200 TABLET ORAL at 08:07

## 2019-05-19 RX ADMIN — HEPARIN SODIUM 5000 UNITS: 5000 INJECTION INTRAVENOUS; SUBCUTANEOUS at 21:20

## 2019-05-19 RX ADMIN — ONDANSETRON 4 MG: 2 INJECTION INTRAMUSCULAR; INTRAVENOUS at 08:14

## 2019-05-19 RX ADMIN — SODIUM CHLORIDE TAB 1 GM 3 G: 1 TAB at 08:06

## 2019-05-19 RX ADMIN — DORZOLAMIDE HYDROCHLORIDE AND TIMOLOL MALEATE 1 DROP: 20; 5 SOLUTION/ DROPS OPHTHALMIC at 08:09

## 2019-05-19 RX ADMIN — SENNOSIDES AND DOCUSATE SODIUM 2 TABLET: 8.6; 5 TABLET ORAL at 08:07

## 2019-05-19 RX ADMIN — HYDRALAZINE HYDROCHLORIDE 10 MG: 20 INJECTION INTRAMUSCULAR; INTRAVENOUS at 10:07

## 2019-05-19 RX ADMIN — SUCRALFATE 1000 MG: 1 SUSPENSION ORAL at 12:07

## 2019-05-19 RX ADMIN — SENNOSIDES AND DOCUSATE SODIUM 2 TABLET: 8.6; 5 TABLET ORAL at 17:42

## 2019-05-19 RX ADMIN — DORZOLAMIDE HYDROCHLORIDE AND TIMOLOL MALEATE 1 DROP: 20; 5 SOLUTION/ DROPS OPHTHALMIC at 17:44

## 2019-05-19 RX ADMIN — HEPARIN SODIUM 5000 UNITS: 5000 INJECTION INTRAVENOUS; SUBCUTANEOUS at 05:30

## 2019-05-19 RX ADMIN — ROPINIROLE 2 MG: 1 TABLET, FILM COATED ORAL at 21:20

## 2019-05-19 RX ADMIN — SUCRALFATE 1000 MG: 1 SUSPENSION ORAL at 05:30

## 2019-05-19 RX ADMIN — ERYTHROMYCIN 1 INCH: 5 OINTMENT OPHTHALMIC at 21:21

## 2019-05-20 VITALS
BODY MASS INDEX: 30.18 KG/M2 | SYSTOLIC BLOOD PRESSURE: 157 MMHG | TEMPERATURE: 98.6 F | HEIGHT: 59 IN | HEART RATE: 76 BPM | WEIGHT: 149.69 LBS | RESPIRATION RATE: 18 BRPM | OXYGEN SATURATION: 94 % | DIASTOLIC BLOOD PRESSURE: 66 MMHG

## 2019-05-20 LAB
ANION GAP SERPL CALCULATED.3IONS-SCNC: 10 MMOL/L (ref 4–13)
BUN SERPL-MCNC: 13 MG/DL (ref 5–25)
CALCIUM SERPL-MCNC: 8.3 MG/DL (ref 8.3–10.1)
CHLORIDE SERPL-SCNC: 102 MMOL/L (ref 100–108)
CO2 SERPL-SCNC: 24 MMOL/L (ref 21–32)
CREAT SERPL-MCNC: 1.04 MG/DL (ref 0.6–1.3)
GFR SERPL CREATININE-BSD FRML MDRD: 47 ML/MIN/1.73SQ M
GLUCOSE SERPL-MCNC: 83 MG/DL (ref 65–140)
POTASSIUM SERPL-SCNC: 3.6 MMOL/L (ref 3.5–5.3)
SODIUM SERPL-SCNC: 136 MMOL/L (ref 136–145)

## 2019-05-20 PROCEDURE — 80048 BASIC METABOLIC PNL TOTAL CA: CPT | Performed by: HOSPITALIST

## 2019-05-20 PROCEDURE — 99239 HOSP IP/OBS DSCHRG MGMT >30: CPT | Performed by: HOSPITALIST

## 2019-05-20 RX ORDER — LISINOPRIL 40 MG/1
40 TABLET ORAL DAILY
Qty: 30 TABLET | Refills: 1 | Status: SHIPPED | OUTPATIENT
Start: 2019-05-21 | End: 2019-06-11 | Stop reason: SDUPTHER

## 2019-05-20 RX ORDER — GABAPENTIN 100 MG/1
100 CAPSULE ORAL 3 TIMES DAILY
Qty: 30 CAPSULE | Refills: 0 | Status: SHIPPED | OUTPATIENT
Start: 2019-05-20 | End: 2019-06-11 | Stop reason: SDUPTHER

## 2019-05-20 RX ORDER — MECLIZINE HYDROCHLORIDE 25 MG/1
25 TABLET ORAL EVERY 8 HOURS PRN
Qty: 30 TABLET | Refills: 0 | Status: SHIPPED | OUTPATIENT
Start: 2019-05-20 | End: 2021-01-01 | Stop reason: SDUPTHER

## 2019-05-20 RX ORDER — MECLIZINE HYDROCHLORIDE 25 MG/1
25 TABLET ORAL EVERY 8 HOURS PRN
Qty: 30 TABLET | Refills: 1 | Status: SHIPPED | OUTPATIENT
Start: 2019-05-20 | End: 2019-05-20

## 2019-05-20 RX ADMIN — HEPARIN SODIUM 5000 UNITS: 5000 INJECTION INTRAVENOUS; SUBCUTANEOUS at 13:55

## 2019-05-20 RX ADMIN — DORZOLAMIDE HYDROCHLORIDE AND TIMOLOL MALEATE 1 DROP: 20; 5 SOLUTION/ DROPS OPHTHALMIC at 09:03

## 2019-05-20 RX ADMIN — ASPIRIN 81 MG 81 MG: 81 TABLET ORAL at 09:02

## 2019-05-20 RX ADMIN — SUCRALFATE 1000 MG: 1 SUSPENSION ORAL at 00:02

## 2019-05-20 RX ADMIN — POLYETHYLENE GLYCOL 3350 17 G: 17 POWDER, FOR SOLUTION ORAL at 09:02

## 2019-05-20 RX ADMIN — LISINOPRIL 40 MG: 20 TABLET ORAL at 09:02

## 2019-05-20 RX ADMIN — MECLIZINE HYDROCHLORIDE 25 MG: 25 TABLET ORAL at 13:55

## 2019-05-20 RX ADMIN — HEPARIN SODIUM 5000 UNITS: 5000 INJECTION INTRAVENOUS; SUBCUTANEOUS at 05:52

## 2019-05-20 RX ADMIN — SUCRALFATE 1000 MG: 1 SUSPENSION ORAL at 05:52

## 2019-05-20 RX ADMIN — PANTOPRAZOLE SODIUM 40 MG: 20 TABLET, DELAYED RELEASE ORAL at 05:52

## 2019-05-20 RX ADMIN — GABAPENTIN 100 MG: 100 CAPSULE ORAL at 09:02

## 2019-05-20 RX ADMIN — AMIODARONE HYDROCHLORIDE 100 MG: 200 TABLET ORAL at 09:03

## 2019-05-20 RX ADMIN — MECLIZINE HYDROCHLORIDE 25 MG: 25 TABLET ORAL at 05:52

## 2019-05-20 RX ADMIN — SUCRALFATE 1000 MG: 1 SUSPENSION ORAL at 12:06

## 2019-05-20 RX ADMIN — SENNOSIDES AND DOCUSATE SODIUM 2 TABLET: 8.6; 5 TABLET ORAL at 09:03

## 2019-05-20 RX ADMIN — FAMOTIDINE 20 MG: 20 TABLET ORAL at 09:02

## 2019-05-21 ENCOUNTER — TRANSITIONAL CARE MANAGEMENT (OUTPATIENT)
Dept: FAMILY MEDICINE CLINIC | Facility: CLINIC | Age: 84
End: 2019-05-21

## 2019-05-21 ENCOUNTER — TELEPHONE (OUTPATIENT)
Dept: OBGYN CLINIC | Facility: HOSPITAL | Age: 84
End: 2019-05-21

## 2019-05-28 ENCOUNTER — TELEPHONE (OUTPATIENT)
Dept: FAMILY MEDICINE CLINIC | Facility: CLINIC | Age: 84
End: 2019-05-28

## 2019-05-29 ENCOUNTER — HOSPITAL ENCOUNTER (INPATIENT)
Facility: HOSPITAL | Age: 84
LOS: 8 days | Discharge: HOME HEALTH | DRG: 304 | End: 2019-06-08
Attending: EMERGENCY MEDICINE | Admitting: INTERNAL MEDICINE
Payer: MEDICARE

## 2019-05-29 ENCOUNTER — APPOINTMENT (EMERGENCY)
Dept: CT IMAGING | Facility: HOSPITAL | Age: 84
DRG: 304 | End: 2019-05-29
Payer: MEDICARE

## 2019-05-29 ENCOUNTER — APPOINTMENT (EMERGENCY)
Dept: RADIOLOGY | Facility: HOSPITAL | Age: 84
DRG: 304 | End: 2019-05-29
Payer: MEDICARE

## 2019-05-29 DIAGNOSIS — M79.89 SHOULDER SWELLING: ICD-10-CM

## 2019-05-29 DIAGNOSIS — Z91.89 AT RISK FOR DEHYDRATION DUE TO POOR FLUID INTAKE: ICD-10-CM

## 2019-05-29 DIAGNOSIS — I16.0 HYPERTENSIVE URGENCY: Primary | ICD-10-CM

## 2019-05-29 DIAGNOSIS — R11.2 NON-INTRACTABLE VOMITING WITH NAUSEA, UNSPECIFIED VOMITING TYPE: ICD-10-CM

## 2019-05-29 DIAGNOSIS — R20.0 LEFT SIDED NUMBNESS: ICD-10-CM

## 2019-05-29 LAB
ALBUMIN SERPL BCP-MCNC: 3.2 G/DL (ref 3.5–5)
ALP SERPL-CCNC: 91 U/L (ref 46–116)
ALT SERPL W P-5'-P-CCNC: 31 U/L (ref 12–78)
ANION GAP SERPL CALCULATED.3IONS-SCNC: 9 MMOL/L (ref 4–13)
AST SERPL W P-5'-P-CCNC: 25 U/L (ref 5–45)
BASOPHILS # BLD AUTO: 0.04 THOUSANDS/ΜL (ref 0–0.1)
BASOPHILS NFR BLD AUTO: 1 % (ref 0–1)
BILIRUB SERPL-MCNC: 0.3 MG/DL (ref 0.2–1)
BUN SERPL-MCNC: 16 MG/DL (ref 5–25)
CALCIUM SERPL-MCNC: 8.9 MG/DL (ref 8.3–10.1)
CHLORIDE SERPL-SCNC: 99 MMOL/L (ref 100–108)
CO2 SERPL-SCNC: 25 MMOL/L (ref 21–32)
CREAT SERPL-MCNC: 1.06 MG/DL (ref 0.6–1.3)
EOSINOPHIL # BLD AUTO: 0.02 THOUSAND/ΜL (ref 0–0.61)
EOSINOPHIL NFR BLD AUTO: 0 % (ref 0–6)
ERYTHROCYTE [DISTWIDTH] IN BLOOD BY AUTOMATED COUNT: 14.8 % (ref 11.6–15.1)
GFR SERPL CREATININE-BSD FRML MDRD: 46 ML/MIN/1.73SQ M
GLUCOSE SERPL-MCNC: 100 MG/DL (ref 65–140)
HCT VFR BLD AUTO: 35.8 % (ref 34.8–46.1)
HGB BLD-MCNC: 12.3 G/DL (ref 11.5–15.4)
IMM GRANULOCYTES # BLD AUTO: 0.02 THOUSAND/UL (ref 0–0.2)
IMM GRANULOCYTES NFR BLD AUTO: 0 % (ref 0–2)
LYMPHOCYTES # BLD AUTO: 1.91 THOUSANDS/ΜL (ref 0.6–4.47)
LYMPHOCYTES NFR BLD AUTO: 34 % (ref 14–44)
MCH RBC QN AUTO: 32.5 PG (ref 26.8–34.3)
MCHC RBC AUTO-ENTMCNC: 34.4 G/DL (ref 31.4–37.4)
MCV RBC AUTO: 95 FL (ref 82–98)
MONOCYTES # BLD AUTO: 0.58 THOUSAND/ΜL (ref 0.17–1.22)
MONOCYTES NFR BLD AUTO: 10 % (ref 4–12)
NEUTROPHILS # BLD AUTO: 3.1 THOUSANDS/ΜL (ref 1.85–7.62)
NEUTS SEG NFR BLD AUTO: 55 % (ref 43–75)
NRBC BLD AUTO-RTO: 0 /100 WBCS
NT-PROBNP SERPL-MCNC: 408 PG/ML
PLATELET # BLD AUTO: 317 THOUSANDS/UL (ref 149–390)
PMV BLD AUTO: 8.7 FL (ref 8.9–12.7)
POTASSIUM SERPL-SCNC: 3.9 MMOL/L (ref 3.5–5.3)
PROT SERPL-MCNC: 7 G/DL (ref 6.4–8.2)
RBC # BLD AUTO: 3.79 MILLION/UL (ref 3.81–5.12)
SODIUM SERPL-SCNC: 133 MMOL/L (ref 136–145)
TROPONIN I SERPL-MCNC: <0.02 NG/ML
WBC # BLD AUTO: 5.67 THOUSAND/UL (ref 4.31–10.16)

## 2019-05-29 PROCEDURE — 84484 ASSAY OF TROPONIN QUANT: CPT | Performed by: PHYSICIAN ASSISTANT

## 2019-05-29 PROCEDURE — 36415 COLL VENOUS BLD VENIPUNCTURE: CPT | Performed by: PHYSICIAN ASSISTANT

## 2019-05-29 PROCEDURE — 93005 ELECTROCARDIOGRAM TRACING: CPT

## 2019-05-29 PROCEDURE — 99284 EMERGENCY DEPT VISIT MOD MDM: CPT | Performed by: PHYSICIAN ASSISTANT

## 2019-05-29 PROCEDURE — 85025 COMPLETE CBC W/AUTO DIFF WBC: CPT | Performed by: PHYSICIAN ASSISTANT

## 2019-05-29 PROCEDURE — 70450 CT HEAD/BRAIN W/O DYE: CPT

## 2019-05-29 PROCEDURE — 80053 COMPREHEN METABOLIC PANEL: CPT | Performed by: PHYSICIAN ASSISTANT

## 2019-05-29 PROCEDURE — 83880 ASSAY OF NATRIURETIC PEPTIDE: CPT | Performed by: PHYSICIAN ASSISTANT

## 2019-05-29 PROCEDURE — 99285 EMERGENCY DEPT VISIT HI MDM: CPT

## 2019-05-29 PROCEDURE — 71045 X-RAY EXAM CHEST 1 VIEW: CPT

## 2019-05-29 RX ORDER — 0.9 % SODIUM CHLORIDE 0.9 %
3 VIAL (ML) INJECTION AS NEEDED
Status: DISCONTINUED | OUTPATIENT
Start: 2019-05-29 | End: 2019-06-08 | Stop reason: HOSPADM

## 2019-05-29 RX ORDER — HYDRALAZINE HYDROCHLORIDE 20 MG/ML
10 INJECTION INTRAMUSCULAR; INTRAVENOUS ONCE
Status: DISCONTINUED | OUTPATIENT
Start: 2019-05-29 | End: 2019-05-29

## 2019-05-30 ENCOUNTER — APPOINTMENT (OUTPATIENT)
Dept: ULTRASOUND IMAGING | Facility: HOSPITAL | Age: 84
DRG: 304 | End: 2019-05-30
Payer: MEDICARE

## 2019-05-30 LAB
ANION GAP SERPL CALCULATED.3IONS-SCNC: 6 MMOL/L (ref 4–13)
ATRIAL RATE: 71 BPM
BUN SERPL-MCNC: 15 MG/DL (ref 5–25)
CALCIUM SERPL-MCNC: 8.4 MG/DL (ref 8.3–10.1)
CHLORIDE SERPL-SCNC: 102 MMOL/L (ref 100–108)
CO2 SERPL-SCNC: 28 MMOL/L (ref 21–32)
CREAT SERPL-MCNC: 1.02 MG/DL (ref 0.6–1.3)
ERYTHROCYTE [DISTWIDTH] IN BLOOD BY AUTOMATED COUNT: 15 % (ref 11.6–15.1)
GFR SERPL CREATININE-BSD FRML MDRD: 48 ML/MIN/1.73SQ M
GLUCOSE SERPL-MCNC: 89 MG/DL (ref 65–140)
HCT VFR BLD AUTO: 35.9 % (ref 34.8–46.1)
HGB BLD-MCNC: 11.9 G/DL (ref 11.5–15.4)
MCH RBC QN AUTO: 32.2 PG (ref 26.8–34.3)
MCHC RBC AUTO-ENTMCNC: 33.1 G/DL (ref 31.4–37.4)
MCV RBC AUTO: 97 FL (ref 82–98)
P AXIS: 70 DEGREES
PLATELET # BLD AUTO: 298 THOUSANDS/UL (ref 149–390)
PLATELET # BLD AUTO: 323 THOUSANDS/UL (ref 149–390)
PMV BLD AUTO: 8.7 FL (ref 8.9–12.7)
PMV BLD AUTO: 8.7 FL (ref 8.9–12.7)
POTASSIUM SERPL-SCNC: 4.2 MMOL/L (ref 3.5–5.3)
PR INTERVAL: 276 MS
QRS AXIS: -49 DEGREES
QRSD INTERVAL: 98 MS
QT INTERVAL: 374 MS
QTC INTERVAL: 406 MS
RBC # BLD AUTO: 3.7 MILLION/UL (ref 3.81–5.12)
SODIUM SERPL-SCNC: 136 MMOL/L (ref 136–145)
T WAVE AXIS: 56 DEGREES
TROPONIN I SERPL-MCNC: <0.02 NG/ML
VENTRICULAR RATE: 71 BPM
WBC # BLD AUTO: 5.23 THOUSAND/UL (ref 4.31–10.16)

## 2019-05-30 PROCEDURE — 93975 VASCULAR STUDY: CPT | Performed by: SURGERY

## 2019-05-30 PROCEDURE — 97167 OT EVAL HIGH COMPLEX 60 MIN: CPT

## 2019-05-30 PROCEDURE — 93975 VASCULAR STUDY: CPT

## 2019-05-30 PROCEDURE — G8979 MOBILITY GOAL STATUS: HCPCS

## 2019-05-30 PROCEDURE — G8987 SELF CARE CURRENT STATUS: HCPCS

## 2019-05-30 PROCEDURE — 85049 AUTOMATED PLATELET COUNT: CPT | Performed by: PHYSICIAN ASSISTANT

## 2019-05-30 PROCEDURE — 84484 ASSAY OF TROPONIN QUANT: CPT | Performed by: PHYSICIAN ASSISTANT

## 2019-05-30 PROCEDURE — 85027 COMPLETE CBC AUTOMATED: CPT | Performed by: PHYSICIAN ASSISTANT

## 2019-05-30 PROCEDURE — 97530 THERAPEUTIC ACTIVITIES: CPT

## 2019-05-30 PROCEDURE — G8978 MOBILITY CURRENT STATUS: HCPCS

## 2019-05-30 PROCEDURE — 99220 PR INITIAL OBSERVATION CARE/DAY 70 MINUTES: CPT | Performed by: PHYSICIAN ASSISTANT

## 2019-05-30 PROCEDURE — 97163 PT EVAL HIGH COMPLEX 45 MIN: CPT

## 2019-05-30 PROCEDURE — G8988 SELF CARE GOAL STATUS: HCPCS

## 2019-05-30 PROCEDURE — 93010 ELECTROCARDIOGRAM REPORT: CPT | Performed by: INTERNAL MEDICINE

## 2019-05-30 PROCEDURE — 80048 BASIC METABOLIC PNL TOTAL CA: CPT | Performed by: PHYSICIAN ASSISTANT

## 2019-05-30 RX ORDER — ONDANSETRON 2 MG/ML
4 INJECTION INTRAMUSCULAR; INTRAVENOUS EVERY 6 HOURS PRN
Status: DISCONTINUED | OUTPATIENT
Start: 2019-05-30 | End: 2019-06-08 | Stop reason: HOSPADM

## 2019-05-30 RX ORDER — FAMOTIDINE 20 MG/1
20 TABLET, FILM COATED ORAL DAILY
Status: DISCONTINUED | OUTPATIENT
Start: 2019-05-30 | End: 2019-06-08 | Stop reason: HOSPADM

## 2019-05-30 RX ORDER — AMOXICILLIN 250 MG
2 CAPSULE ORAL 2 TIMES DAILY
Status: DISCONTINUED | OUTPATIENT
Start: 2019-05-30 | End: 2019-06-01

## 2019-05-30 RX ORDER — HYDRALAZINE HYDROCHLORIDE 20 MG/ML
10 INJECTION INTRAMUSCULAR; INTRAVENOUS EVERY 6 HOURS PRN
Status: DISCONTINUED | OUTPATIENT
Start: 2019-05-30 | End: 2019-06-05

## 2019-05-30 RX ORDER — SUCRALFATE ORAL 1 G/10ML
1000 SUSPENSION ORAL EVERY 6 HOURS SCHEDULED
Status: DISCONTINUED | OUTPATIENT
Start: 2019-05-30 | End: 2019-06-08 | Stop reason: HOSPADM

## 2019-05-30 RX ORDER — HEPARIN SODIUM 5000 [USP'U]/ML
5000 INJECTION, SOLUTION INTRAVENOUS; SUBCUTANEOUS EVERY 8 HOURS SCHEDULED
Status: DISCONTINUED | OUTPATIENT
Start: 2019-05-30 | End: 2019-06-08 | Stop reason: HOSPADM

## 2019-05-30 RX ORDER — ASPIRIN 81 MG/1
81 TABLET, CHEWABLE ORAL 2 TIMES DAILY
Status: DISCONTINUED | OUTPATIENT
Start: 2019-05-30 | End: 2019-06-08 | Stop reason: HOSPADM

## 2019-05-30 RX ORDER — CHLORAL HYDRATE 500 MG
1000 CAPSULE ORAL 2 TIMES DAILY
Status: DISCONTINUED | OUTPATIENT
Start: 2019-05-30 | End: 2019-06-08 | Stop reason: HOSPADM

## 2019-05-30 RX ORDER — PANTOPRAZOLE SODIUM 40 MG/1
40 TABLET, DELAYED RELEASE ORAL
Status: DISCONTINUED | OUTPATIENT
Start: 2019-05-30 | End: 2019-06-02

## 2019-05-30 RX ORDER — ALPRAZOLAM 0.25 MG/1
0.25 TABLET ORAL
Status: DISCONTINUED | OUTPATIENT
Start: 2019-05-30 | End: 2019-06-08 | Stop reason: HOSPADM

## 2019-05-30 RX ORDER — ACETAMINOPHEN 325 MG/1
650 TABLET ORAL EVERY 6 HOURS PRN
Status: DISCONTINUED | OUTPATIENT
Start: 2019-05-30 | End: 2019-06-08 | Stop reason: HOSPADM

## 2019-05-30 RX ORDER — AMIODARONE HYDROCHLORIDE 200 MG/1
100 TABLET ORAL DAILY
Status: DISCONTINUED | OUTPATIENT
Start: 2019-05-30 | End: 2019-06-08 | Stop reason: HOSPADM

## 2019-05-30 RX ORDER — HYDRALAZINE HYDROCHLORIDE 20 MG/ML
5 INJECTION INTRAMUSCULAR; INTRAVENOUS EVERY 6 HOURS PRN
Status: DISCONTINUED | OUTPATIENT
Start: 2019-05-30 | End: 2019-05-30

## 2019-05-30 RX ORDER — ROPINIROLE 1 MG/1
2 TABLET, FILM COATED ORAL
Status: DISCONTINUED | OUTPATIENT
Start: 2019-05-30 | End: 2019-06-08 | Stop reason: HOSPADM

## 2019-05-30 RX ORDER — POLYETHYLENE GLYCOL 3350 17 G/17G
17 POWDER, FOR SOLUTION ORAL DAILY PRN
Status: DISCONTINUED | OUTPATIENT
Start: 2019-05-30 | End: 2019-06-01

## 2019-05-30 RX ORDER — DORZOLAMIDE HYDROCHLORIDE AND TIMOLOL MALEATE 20; 5 MG/ML; MG/ML
1 SOLUTION/ DROPS OPHTHALMIC 2 TIMES DAILY
Status: DISCONTINUED | OUTPATIENT
Start: 2019-05-30 | End: 2019-06-08 | Stop reason: HOSPADM

## 2019-05-30 RX ORDER — GABAPENTIN 100 MG/1
100 CAPSULE ORAL DAILY
Status: DISCONTINUED | OUTPATIENT
Start: 2019-05-30 | End: 2019-06-08 | Stop reason: HOSPADM

## 2019-05-30 RX ORDER — LISINOPRIL 20 MG/1
40 TABLET ORAL DAILY
Status: DISCONTINUED | OUTPATIENT
Start: 2019-05-30 | End: 2019-06-08 | Stop reason: HOSPADM

## 2019-05-30 RX ORDER — TRAZODONE HYDROCHLORIDE 50 MG/1
50 TABLET ORAL
Status: DISCONTINUED | OUTPATIENT
Start: 2019-05-30 | End: 2019-06-08 | Stop reason: HOSPADM

## 2019-05-30 RX ORDER — MECLIZINE HYDROCHLORIDE 25 MG/1
25 TABLET ORAL EVERY 8 HOURS PRN
Status: DISCONTINUED | OUTPATIENT
Start: 2019-05-30 | End: 2019-06-08 | Stop reason: HOSPADM

## 2019-05-30 RX ORDER — LOTEPREDNOL ETABONATE 5 MG/ML
1 SUSPENSION/ DROPS OPHTHALMIC 3 TIMES DAILY
Status: DISCONTINUED | OUTPATIENT
Start: 2019-05-30 | End: 2019-05-30 | Stop reason: ALTCHOICE

## 2019-05-30 RX ORDER — AMLODIPINE BESYLATE 5 MG/1
5 TABLET ORAL DAILY
Status: DISCONTINUED | OUTPATIENT
Start: 2019-05-30 | End: 2019-06-04

## 2019-05-30 RX ORDER — ERYTHROMYCIN 5 MG/G
OINTMENT OPHTHALMIC
Status: DISCONTINUED | OUTPATIENT
Start: 2019-05-30 | End: 2019-06-08 | Stop reason: HOSPADM

## 2019-05-30 RX ADMIN — HEPARIN SODIUM 5000 UNITS: 5000 INJECTION INTRAVENOUS; SUBCUTANEOUS at 23:06

## 2019-05-30 RX ADMIN — SUCRALFATE 1000 MG: 1 SUSPENSION ORAL at 11:17

## 2019-05-30 RX ADMIN — ASPIRIN 81 MG 81 MG: 81 TABLET ORAL at 17:43

## 2019-05-30 RX ADMIN — PANTOPRAZOLE SODIUM 40 MG: 40 TABLET, DELAYED RELEASE ORAL at 06:31

## 2019-05-30 RX ADMIN — Medication 1000 MG: at 17:43

## 2019-05-30 RX ADMIN — LISINOPRIL 40 MG: 20 TABLET ORAL at 09:08

## 2019-05-30 RX ADMIN — AMLODIPINE BESYLATE 5 MG: 5 TABLET ORAL at 15:47

## 2019-05-30 RX ADMIN — ASPIRIN 81 MG 81 MG: 81 TABLET ORAL at 09:08

## 2019-05-30 RX ADMIN — HEPARIN SODIUM 5000 UNITS: 5000 INJECTION INTRAVENOUS; SUBCUTANEOUS at 14:26

## 2019-05-30 RX ADMIN — TRAZODONE HYDROCHLORIDE 50 MG: 50 TABLET ORAL at 01:18

## 2019-05-30 RX ADMIN — SENNOSIDES AND DOCUSATE SODIUM 2 TABLET: 8.6; 5 TABLET ORAL at 17:43

## 2019-05-30 RX ADMIN — DORZOLAMIDE HYDROCHLORIDE AND TIMOLOL MALEATE 1 DROP: 20; 5 SOLUTION/ DROPS OPHTHALMIC at 17:43

## 2019-05-30 RX ADMIN — GABAPENTIN 100 MG: 100 CAPSULE ORAL at 09:07

## 2019-05-30 RX ADMIN — ERYTHROMYCIN: 5 OINTMENT OPHTHALMIC at 23:06

## 2019-05-30 RX ADMIN — FAMOTIDINE 20 MG: 20 TABLET ORAL at 09:07

## 2019-05-30 RX ADMIN — ROPINIROLE HYDROCHLORIDE 2 MG: 1 TABLET, FILM COATED ORAL at 23:05

## 2019-05-30 RX ADMIN — SUCRALFATE 1000 MG: 1 SUSPENSION ORAL at 06:31

## 2019-05-30 RX ADMIN — DORZOLAMIDE HYDROCHLORIDE AND TIMOLOL MALEATE 1 DROP: 20; 5 SOLUTION/ DROPS OPHTHALMIC at 09:08

## 2019-05-30 RX ADMIN — SENNOSIDES AND DOCUSATE SODIUM 2 TABLET: 8.6; 5 TABLET ORAL at 09:08

## 2019-05-30 RX ADMIN — Medication 1000 MG: at 09:07

## 2019-05-30 RX ADMIN — ROPINIROLE HYDROCHLORIDE 2 MG: 1 TABLET, FILM COATED ORAL at 01:18

## 2019-05-30 RX ADMIN — AMIODARONE HYDROCHLORIDE 100 MG: 200 TABLET ORAL at 09:07

## 2019-05-30 RX ADMIN — SUCRALFATE 1000 MG: 1 SUSPENSION ORAL at 17:43

## 2019-05-30 RX ADMIN — HEPARIN SODIUM 5000 UNITS: 5000 INJECTION INTRAVENOUS; SUBCUTANEOUS at 06:31

## 2019-05-30 RX ADMIN — ACETAMINOPHEN 650 MG: 325 TABLET, FILM COATED ORAL at 06:38

## 2019-05-30 RX ADMIN — TRAZODONE HYDROCHLORIDE 50 MG: 50 TABLET ORAL at 23:05

## 2019-05-30 RX ADMIN — SUCRALFATE 1000 MG: 1 SUSPENSION ORAL at 01:18

## 2019-05-30 RX ADMIN — PANTOPRAZOLE SODIUM 40 MG: 40 TABLET, DELAYED RELEASE ORAL at 15:47

## 2019-05-31 PROBLEM — R20.0 LEFT SIDED NUMBNESS: Status: ACTIVE | Noted: 2019-05-31

## 2019-05-31 LAB
ANION GAP SERPL CALCULATED.3IONS-SCNC: 9 MMOL/L (ref 4–13)
ATRIAL RATE: 71 BPM
ATRIAL RATE: 71 BPM
BASOPHILS # BLD AUTO: 0.06 THOUSANDS/ΜL (ref 0–0.1)
BASOPHILS NFR BLD AUTO: 1 % (ref 0–1)
BUN SERPL-MCNC: 11 MG/DL (ref 5–25)
CALCIUM SERPL-MCNC: 8.6 MG/DL (ref 8.3–10.1)
CHLORIDE SERPL-SCNC: 102 MMOL/L (ref 100–108)
CO2 SERPL-SCNC: 24 MMOL/L (ref 21–32)
CREAT SERPL-MCNC: 0.91 MG/DL (ref 0.6–1.3)
EOSINOPHIL # BLD AUTO: 0.05 THOUSAND/ΜL (ref 0–0.61)
EOSINOPHIL NFR BLD AUTO: 1 % (ref 0–6)
ERYTHROCYTE [DISTWIDTH] IN BLOOD BY AUTOMATED COUNT: 15.2 % (ref 11.6–15.1)
GFR SERPL CREATININE-BSD FRML MDRD: 55 ML/MIN/1.73SQ M
GLUCOSE SERPL-MCNC: 83 MG/DL (ref 65–140)
HCT VFR BLD AUTO: 33.5 % (ref 34.8–46.1)
HGB BLD-MCNC: 11.3 G/DL (ref 11.5–15.4)
IMM GRANULOCYTES # BLD AUTO: 0.01 THOUSAND/UL (ref 0–0.2)
IMM GRANULOCYTES NFR BLD AUTO: 0 % (ref 0–2)
LYMPHOCYTES # BLD AUTO: 2.03 THOUSANDS/ΜL (ref 0.6–4.47)
LYMPHOCYTES NFR BLD AUTO: 42 % (ref 14–44)
MCH RBC QN AUTO: 32 PG (ref 26.8–34.3)
MCHC RBC AUTO-ENTMCNC: 33.7 G/DL (ref 31.4–37.4)
MCV RBC AUTO: 95 FL (ref 82–98)
MONOCYTES # BLD AUTO: 0.53 THOUSAND/ΜL (ref 0.17–1.22)
MONOCYTES NFR BLD AUTO: 11 % (ref 4–12)
NEUTROPHILS # BLD AUTO: 2.2 THOUSANDS/ΜL (ref 1.85–7.62)
NEUTS SEG NFR BLD AUTO: 45 % (ref 43–75)
NRBC BLD AUTO-RTO: 0 /100 WBCS
P AXIS: 77 DEGREES
P AXIS: 77 DEGREES
PLATELET # BLD AUTO: 285 THOUSANDS/UL (ref 149–390)
PMV BLD AUTO: 9 FL (ref 8.9–12.7)
POTASSIUM SERPL-SCNC: 4.3 MMOL/L (ref 3.5–5.3)
PR INTERVAL: 290 MS
PR INTERVAL: 290 MS
QRS AXIS: -42 DEGREES
QRS AXIS: -42 DEGREES
QRSD INTERVAL: 94 MS
QRSD INTERVAL: 94 MS
QT INTERVAL: 394 MS
QT INTERVAL: 394 MS
QTC INTERVAL: 428 MS
QTC INTERVAL: 428 MS
RBC # BLD AUTO: 3.53 MILLION/UL (ref 3.81–5.12)
SODIUM SERPL-SCNC: 135 MMOL/L (ref 136–145)
T WAVE AXIS: 42 DEGREES
T WAVE AXIS: 42 DEGREES
VENTRICULAR RATE: 71 BPM
VENTRICULAR RATE: 71 BPM
WBC # BLD AUTO: 4.88 THOUSAND/UL (ref 4.31–10.16)

## 2019-05-31 PROCEDURE — 80048 BASIC METABOLIC PNL TOTAL CA: CPT | Performed by: INTERNAL MEDICINE

## 2019-05-31 PROCEDURE — 85025 COMPLETE CBC W/AUTO DIFF WBC: CPT | Performed by: INTERNAL MEDICINE

## 2019-05-31 PROCEDURE — 93005 ELECTROCARDIOGRAM TRACING: CPT

## 2019-05-31 PROCEDURE — 97116 GAIT TRAINING THERAPY: CPT

## 2019-05-31 PROCEDURE — 93010 ELECTROCARDIOGRAM REPORT: CPT | Performed by: INTERNAL MEDICINE

## 2019-05-31 PROCEDURE — 83835 ASSAY OF METANEPHRINES: CPT | Performed by: INTERNAL MEDICINE

## 2019-05-31 PROCEDURE — 84244 ASSAY OF RENIN: CPT | Performed by: INTERNAL MEDICINE

## 2019-05-31 PROCEDURE — 82088 ASSAY OF ALDOSTERONE: CPT | Performed by: INTERNAL MEDICINE

## 2019-05-31 PROCEDURE — 99232 SBSQ HOSP IP/OBS MODERATE 35: CPT | Performed by: INTERNAL MEDICINE

## 2019-05-31 RX ADMIN — Medication 1000 MG: at 08:06

## 2019-05-31 RX ADMIN — Medication 1000 MG: at 17:38

## 2019-05-31 RX ADMIN — FAMOTIDINE 20 MG: 20 TABLET ORAL at 08:06

## 2019-05-31 RX ADMIN — SUCRALFATE 1000 MG: 1 SUSPENSION ORAL at 12:50

## 2019-05-31 RX ADMIN — PANTOPRAZOLE SODIUM 40 MG: 40 TABLET, DELAYED RELEASE ORAL at 16:25

## 2019-05-31 RX ADMIN — LISINOPRIL 40 MG: 20 TABLET ORAL at 08:06

## 2019-05-31 RX ADMIN — DORZOLAMIDE HYDROCHLORIDE AND TIMOLOL MALEATE 1 DROP: 20; 5 SOLUTION/ DROPS OPHTHALMIC at 17:39

## 2019-05-31 RX ADMIN — ROPINIROLE HYDROCHLORIDE 2 MG: 1 TABLET, FILM COATED ORAL at 21:51

## 2019-05-31 RX ADMIN — POLYETHYLENE GLYCOL 3350 17 G: 17 POWDER, FOR SOLUTION ORAL at 08:16

## 2019-05-31 RX ADMIN — AMIODARONE HYDROCHLORIDE 100 MG: 200 TABLET ORAL at 08:07

## 2019-05-31 RX ADMIN — HEPARIN SODIUM 5000 UNITS: 5000 INJECTION INTRAVENOUS; SUBCUTANEOUS at 13:14

## 2019-05-31 RX ADMIN — AMLODIPINE BESYLATE 5 MG: 5 TABLET ORAL at 08:06

## 2019-05-31 RX ADMIN — ASPIRIN 81 MG 81 MG: 81 TABLET ORAL at 17:38

## 2019-05-31 RX ADMIN — HYDRALAZINE HYDROCHLORIDE 10 MG: 20 INJECTION INTRAMUSCULAR; INTRAVENOUS at 17:43

## 2019-05-31 RX ADMIN — TRAZODONE HYDROCHLORIDE 50 MG: 50 TABLET ORAL at 21:52

## 2019-05-31 RX ADMIN — ASPIRIN 81 MG 81 MG: 81 TABLET ORAL at 08:07

## 2019-05-31 RX ADMIN — SUCRALFATE 1000 MG: 1 SUSPENSION ORAL at 17:37

## 2019-05-31 RX ADMIN — GABAPENTIN 100 MG: 100 CAPSULE ORAL at 08:06

## 2019-05-31 RX ADMIN — DORZOLAMIDE HYDROCHLORIDE AND TIMOLOL MALEATE 1 DROP: 20; 5 SOLUTION/ DROPS OPHTHALMIC at 08:12

## 2019-05-31 RX ADMIN — SENNOSIDES AND DOCUSATE SODIUM 2 TABLET: 8.6; 5 TABLET ORAL at 08:06

## 2019-05-31 RX ADMIN — HEPARIN SODIUM 5000 UNITS: 5000 INJECTION INTRAVENOUS; SUBCUTANEOUS at 21:51

## 2019-05-31 RX ADMIN — SUCRALFATE 1000 MG: 1 SUSPENSION ORAL at 05:39

## 2019-05-31 RX ADMIN — HYDRALAZINE HYDROCHLORIDE 10 MG: 20 INJECTION INTRAMUSCULAR; INTRAVENOUS at 09:40

## 2019-05-31 RX ADMIN — SENNOSIDES AND DOCUSATE SODIUM 2 TABLET: 8.6; 5 TABLET ORAL at 17:37

## 2019-05-31 RX ADMIN — SUCRALFATE 1000 MG: 1 SUSPENSION ORAL at 00:35

## 2019-05-31 RX ADMIN — HEPARIN SODIUM 5000 UNITS: 5000 INJECTION INTRAVENOUS; SUBCUTANEOUS at 05:37

## 2019-05-31 RX ADMIN — PANTOPRAZOLE SODIUM 40 MG: 40 TABLET, DELAYED RELEASE ORAL at 05:39

## 2019-06-01 PROBLEM — I63.511 CEREBROVASCULAR ACCIDENT (CVA) DUE TO STENOSIS OF RIGHT MIDDLE CEREBRAL ARTERY (HCC): Status: ACTIVE | Noted: 2019-06-01

## 2019-06-01 PROCEDURE — 99232 SBSQ HOSP IP/OBS MODERATE 35: CPT | Performed by: INTERNAL MEDICINE

## 2019-06-01 PROCEDURE — 99223 1ST HOSP IP/OBS HIGH 75: CPT | Performed by: PSYCHIATRY & NEUROLOGY

## 2019-06-01 RX ORDER — AMOXICILLIN 250 MG
1 CAPSULE ORAL DAILY PRN
Status: DISCONTINUED | OUTPATIENT
Start: 2019-06-01 | End: 2019-06-08 | Stop reason: HOSPADM

## 2019-06-01 RX ADMIN — DORZOLAMIDE HYDROCHLORIDE AND TIMOLOL MALEATE 1 DROP: 20; 5 SOLUTION/ DROPS OPHTHALMIC at 08:41

## 2019-06-01 RX ADMIN — ASPIRIN 81 MG 81 MG: 81 TABLET ORAL at 18:35

## 2019-06-01 RX ADMIN — Medication 1000 MG: at 08:37

## 2019-06-01 RX ADMIN — Medication 1000 MG: at 18:35

## 2019-06-01 RX ADMIN — ONDANSETRON 4 MG: 2 INJECTION INTRAMUSCULAR; INTRAVENOUS at 06:11

## 2019-06-01 RX ADMIN — HEPARIN SODIUM 5000 UNITS: 5000 INJECTION INTRAVENOUS; SUBCUTANEOUS at 21:15

## 2019-06-01 RX ADMIN — FAMOTIDINE 20 MG: 20 TABLET ORAL at 08:37

## 2019-06-01 RX ADMIN — DORZOLAMIDE HYDROCHLORIDE AND TIMOLOL MALEATE 1 DROP: 20; 5 SOLUTION/ DROPS OPHTHALMIC at 18:35

## 2019-06-01 RX ADMIN — ONDANSETRON 4 MG: 2 INJECTION INTRAMUSCULAR; INTRAVENOUS at 12:18

## 2019-06-01 RX ADMIN — TRAZODONE HYDROCHLORIDE 50 MG: 50 TABLET ORAL at 21:15

## 2019-06-01 RX ADMIN — PANTOPRAZOLE SODIUM 40 MG: 40 TABLET, DELAYED RELEASE ORAL at 15:55

## 2019-06-01 RX ADMIN — ASPIRIN 81 MG 81 MG: 81 TABLET ORAL at 08:37

## 2019-06-01 RX ADMIN — HEPARIN SODIUM 5000 UNITS: 5000 INJECTION INTRAVENOUS; SUBCUTANEOUS at 15:55

## 2019-06-01 RX ADMIN — ROPINIROLE HYDROCHLORIDE 2 MG: 1 TABLET, FILM COATED ORAL at 21:15

## 2019-06-01 RX ADMIN — AMLODIPINE BESYLATE 5 MG: 5 TABLET ORAL at 08:38

## 2019-06-01 RX ADMIN — SUCRALFATE 1000 MG: 1 SUSPENSION ORAL at 23:37

## 2019-06-01 RX ADMIN — ERYTHROMYCIN: 5 OINTMENT OPHTHALMIC at 21:15

## 2019-06-01 RX ADMIN — PANTOPRAZOLE SODIUM 40 MG: 40 TABLET, DELAYED RELEASE ORAL at 06:00

## 2019-06-01 RX ADMIN — LISINOPRIL 40 MG: 20 TABLET ORAL at 08:37

## 2019-06-01 RX ADMIN — SUCRALFATE 1000 MG: 1 SUSPENSION ORAL at 05:57

## 2019-06-01 RX ADMIN — SENNOSIDES AND DOCUSATE SODIUM 2 TABLET: 8.6; 5 TABLET ORAL at 08:38

## 2019-06-01 RX ADMIN — AMIODARONE HYDROCHLORIDE 100 MG: 200 TABLET ORAL at 08:37

## 2019-06-01 RX ADMIN — HEPARIN SODIUM 5000 UNITS: 5000 INJECTION INTRAVENOUS; SUBCUTANEOUS at 05:57

## 2019-06-01 RX ADMIN — GABAPENTIN 100 MG: 100 CAPSULE ORAL at 08:37

## 2019-06-02 ENCOUNTER — APPOINTMENT (INPATIENT)
Dept: NON INVASIVE DIAGNOSTICS | Facility: HOSPITAL | Age: 84
DRG: 304 | End: 2019-06-02
Payer: MEDICARE

## 2019-06-02 ENCOUNTER — APPOINTMENT (INPATIENT)
Dept: ULTRASOUND IMAGING | Facility: HOSPITAL | Age: 84
DRG: 304 | End: 2019-06-02
Payer: MEDICARE

## 2019-06-02 DIAGNOSIS — F51.01 PRIMARY INSOMNIA: ICD-10-CM

## 2019-06-02 PROBLEM — Z91.89 AT RISK FOR DEHYDRATION DUE TO POOR FLUID INTAKE: Status: ACTIVE | Noted: 2019-06-02

## 2019-06-02 LAB
ALBUMIN SERPL BCP-MCNC: 2.5 G/DL (ref 3.5–5)
ALP SERPL-CCNC: 74 U/L (ref 46–116)
ALT SERPL W P-5'-P-CCNC: 23 U/L (ref 12–78)
ANION GAP SERPL CALCULATED.3IONS-SCNC: 8 MMOL/L (ref 4–13)
ANION GAP SERPL CALCULATED.3IONS-SCNC: 8 MMOL/L (ref 4–13)
ANION GAP SERPL CALCULATED.3IONS-SCNC: 9 MMOL/L (ref 4–13)
AST SERPL W P-5'-P-CCNC: 23 U/L (ref 5–45)
BILIRUB SERPL-MCNC: 0.3 MG/DL (ref 0.2–1)
BUN SERPL-MCNC: 7 MG/DL (ref 5–25)
BUN SERPL-MCNC: 9 MG/DL (ref 5–25)
BUN SERPL-MCNC: 9 MG/DL (ref 5–25)
CALCIUM SERPL-MCNC: 8.1 MG/DL (ref 8.3–10.1)
CALCIUM SERPL-MCNC: 8.2 MG/DL (ref 8.3–10.1)
CALCIUM SERPL-MCNC: 8.6 MG/DL (ref 8.3–10.1)
CHLORIDE SERPL-SCNC: 94 MMOL/L (ref 100–108)
CHLORIDE SERPL-SCNC: 97 MMOL/L (ref 100–108)
CHLORIDE SERPL-SCNC: 97 MMOL/L (ref 100–108)
CO2 SERPL-SCNC: 23 MMOL/L (ref 21–32)
CO2 SERPL-SCNC: 24 MMOL/L (ref 21–32)
CO2 SERPL-SCNC: 25 MMOL/L (ref 21–32)
CREAT SERPL-MCNC: 0.9 MG/DL (ref 0.6–1.3)
CREAT SERPL-MCNC: 0.92 MG/DL (ref 0.6–1.3)
CREAT SERPL-MCNC: 1 MG/DL (ref 0.6–1.3)
ERYTHROCYTE [DISTWIDTH] IN BLOOD BY AUTOMATED COUNT: 14.9 % (ref 11.6–15.1)
GFR SERPL CREATININE-BSD FRML MDRD: 49 ML/MIN/1.73SQ M
GFR SERPL CREATININE-BSD FRML MDRD: 54 ML/MIN/1.73SQ M
GFR SERPL CREATININE-BSD FRML MDRD: 56 ML/MIN/1.73SQ M
GLUCOSE SERPL-MCNC: 101 MG/DL (ref 65–140)
GLUCOSE SERPL-MCNC: 125 MG/DL (ref 65–140)
GLUCOSE SERPL-MCNC: 86 MG/DL (ref 65–140)
HCT VFR BLD AUTO: 30.3 % (ref 34.8–46.1)
HGB BLD-MCNC: 10.5 G/DL (ref 11.5–15.4)
LIPASE SERPL-CCNC: 142 U/L (ref 73–393)
MCH RBC QN AUTO: 32.5 PG (ref 26.8–34.3)
MCHC RBC AUTO-ENTMCNC: 34.7 G/DL (ref 31.4–37.4)
MCV RBC AUTO: 94 FL (ref 82–98)
OSMOLALITY UR/SERPL-RTO: 269 MMOL/KG (ref 282–298)
PLATELET # BLD AUTO: 227 THOUSANDS/UL (ref 149–390)
PMV BLD AUTO: 8.7 FL (ref 8.9–12.7)
POTASSIUM SERPL-SCNC: 3.6 MMOL/L (ref 3.5–5.3)
POTASSIUM SERPL-SCNC: 3.9 MMOL/L (ref 3.5–5.3)
POTASSIUM SERPL-SCNC: 3.9 MMOL/L (ref 3.5–5.3)
PROT SERPL-MCNC: 5.7 G/DL (ref 6.4–8.2)
RBC # BLD AUTO: 3.23 MILLION/UL (ref 3.81–5.12)
SODIUM SERPL-SCNC: 127 MMOL/L (ref 136–145)
SODIUM SERPL-SCNC: 129 MMOL/L (ref 136–145)
SODIUM SERPL-SCNC: 129 MMOL/L (ref 136–145)
WBC # BLD AUTO: 4.67 THOUSAND/UL (ref 4.31–10.16)

## 2019-06-02 PROCEDURE — 85027 COMPLETE CBC AUTOMATED: CPT | Performed by: INTERNAL MEDICINE

## 2019-06-02 PROCEDURE — 99232 SBSQ HOSP IP/OBS MODERATE 35: CPT | Performed by: INTERNAL MEDICINE

## 2019-06-02 PROCEDURE — 93306 TTE W/DOPPLER COMPLETE: CPT

## 2019-06-02 PROCEDURE — 80053 COMPREHEN METABOLIC PANEL: CPT | Performed by: INTERNAL MEDICINE

## 2019-06-02 PROCEDURE — 80048 BASIC METABOLIC PNL TOTAL CA: CPT | Performed by: INTERNAL MEDICINE

## 2019-06-02 PROCEDURE — 83930 ASSAY OF BLOOD OSMOLALITY: CPT | Performed by: INTERNAL MEDICINE

## 2019-06-02 PROCEDURE — 93975 VASCULAR STUDY: CPT

## 2019-06-02 PROCEDURE — 83690 ASSAY OF LIPASE: CPT | Performed by: INTERNAL MEDICINE

## 2019-06-02 RX ORDER — METOCLOPRAMIDE HYDROCHLORIDE 5 MG/ML
5 INJECTION INTRAMUSCULAR; INTRAVENOUS
Status: DISCONTINUED | OUTPATIENT
Start: 2019-06-02 | End: 2019-06-08 | Stop reason: HOSPADM

## 2019-06-02 RX ORDER — PANTOPRAZOLE SODIUM 40 MG/1
40 TABLET, DELAYED RELEASE ORAL
Status: DISCONTINUED | OUTPATIENT
Start: 2019-06-03 | End: 2019-06-08 | Stop reason: HOSPADM

## 2019-06-02 RX ORDER — SODIUM CHLORIDE 9 MG/ML
75 INJECTION, SOLUTION INTRAVENOUS CONTINUOUS
Status: DISCONTINUED | OUTPATIENT
Start: 2019-06-02 | End: 2019-06-04

## 2019-06-02 RX ADMIN — Medication 1000 MG: at 08:15

## 2019-06-02 RX ADMIN — LISINOPRIL 40 MG: 20 TABLET ORAL at 08:15

## 2019-06-02 RX ADMIN — HEPARIN SODIUM 5000 UNITS: 5000 INJECTION INTRAVENOUS; SUBCUTANEOUS at 22:05

## 2019-06-02 RX ADMIN — FAMOTIDINE 20 MG: 20 TABLET ORAL at 08:16

## 2019-06-02 RX ADMIN — DORZOLAMIDE HYDROCHLORIDE AND TIMOLOL MALEATE 1 DROP: 20; 5 SOLUTION/ DROPS OPHTHALMIC at 08:15

## 2019-06-02 RX ADMIN — DORZOLAMIDE HYDROCHLORIDE AND TIMOLOL MALEATE 1 DROP: 20; 5 SOLUTION/ DROPS OPHTHALMIC at 18:37

## 2019-06-02 RX ADMIN — GABAPENTIN 100 MG: 100 CAPSULE ORAL at 08:15

## 2019-06-02 RX ADMIN — ROPINIROLE HYDROCHLORIDE 2 MG: 1 TABLET, FILM COATED ORAL at 22:05

## 2019-06-02 RX ADMIN — PANTOPRAZOLE SODIUM 40 MG: 40 TABLET, DELAYED RELEASE ORAL at 06:14

## 2019-06-02 RX ADMIN — METOCLOPRAMIDE HYDROCHLORIDE 5 MG: 5 INJECTION INTRAMUSCULAR; INTRAVENOUS at 18:38

## 2019-06-02 RX ADMIN — HYDRALAZINE HYDROCHLORIDE 10 MG: 20 INJECTION INTRAMUSCULAR; INTRAVENOUS at 23:40

## 2019-06-02 RX ADMIN — ASPIRIN 81 MG 81 MG: 81 TABLET ORAL at 18:37

## 2019-06-02 RX ADMIN — HEPARIN SODIUM 5000 UNITS: 5000 INJECTION INTRAVENOUS; SUBCUTANEOUS at 06:14

## 2019-06-02 RX ADMIN — TRAZODONE HYDROCHLORIDE 50 MG: 50 TABLET ORAL at 22:05

## 2019-06-02 RX ADMIN — SODIUM CHLORIDE 75 ML/HR: 0.9 INJECTION, SOLUTION INTRAVENOUS at 12:46

## 2019-06-02 RX ADMIN — HEPARIN SODIUM 5000 UNITS: 5000 INJECTION INTRAVENOUS; SUBCUTANEOUS at 14:25

## 2019-06-02 RX ADMIN — Medication 1000 MG: at 18:37

## 2019-06-02 RX ADMIN — ONDANSETRON 4 MG: 2 INJECTION INTRAMUSCULAR; INTRAVENOUS at 08:13

## 2019-06-02 RX ADMIN — AMIODARONE HYDROCHLORIDE 100 MG: 200 TABLET ORAL at 08:15

## 2019-06-02 RX ADMIN — SUCRALFATE 1000 MG: 1 SUSPENSION ORAL at 06:14

## 2019-06-02 RX ADMIN — SUCRALFATE 1000 MG: 1 SUSPENSION ORAL at 23:20

## 2019-06-02 RX ADMIN — ASPIRIN 81 MG 81 MG: 81 TABLET ORAL at 08:15

## 2019-06-02 RX ADMIN — METOCLOPRAMIDE HYDROCHLORIDE 5 MG: 5 INJECTION INTRAMUSCULAR; INTRAVENOUS at 14:25

## 2019-06-02 RX ADMIN — AMLODIPINE BESYLATE 5 MG: 5 TABLET ORAL at 08:15

## 2019-06-02 RX ADMIN — ERYTHROMYCIN: 5 OINTMENT OPHTHALMIC at 22:05

## 2019-06-03 LAB
ALDOST SERPL-MCNC: 2.4 NG/DL (ref 0–30)
ANION GAP SERPL CALCULATED.3IONS-SCNC: 8 MMOL/L (ref 4–13)
BUN SERPL-MCNC: 6 MG/DL (ref 5–25)
CALCIUM SERPL-MCNC: 8.3 MG/DL (ref 8.3–10.1)
CHLORIDE SERPL-SCNC: 101 MMOL/L (ref 100–108)
CO2 SERPL-SCNC: 25 MMOL/L (ref 21–32)
CREAT SERPL-MCNC: 0.89 MG/DL (ref 0.6–1.3)
GFR SERPL CREATININE-BSD FRML MDRD: 56 ML/MIN/1.73SQ M
GLUCOSE SERPL-MCNC: 96 MG/DL (ref 65–140)
POTASSIUM SERPL-SCNC: 3.8 MMOL/L (ref 3.5–5.3)
SODIUM SERPL-SCNC: 134 MMOL/L (ref 136–145)

## 2019-06-03 PROCEDURE — 80048 BASIC METABOLIC PNL TOTAL CA: CPT | Performed by: INTERNAL MEDICINE

## 2019-06-03 PROCEDURE — 93975 VASCULAR STUDY: CPT | Performed by: SURGERY

## 2019-06-03 PROCEDURE — 93306 TTE W/DOPPLER COMPLETE: CPT | Performed by: INTERNAL MEDICINE

## 2019-06-03 PROCEDURE — 99222 1ST HOSP IP/OBS MODERATE 55: CPT | Performed by: INTERNAL MEDICINE

## 2019-06-03 PROCEDURE — 99232 SBSQ HOSP IP/OBS MODERATE 35: CPT | Performed by: HOSPITALIST

## 2019-06-03 RX ORDER — ALPRAZOLAM 0.25 MG/1
TABLET ORAL
Qty: 60 TABLET | Refills: 5 | OUTPATIENT
Start: 2019-06-03

## 2019-06-03 RX ADMIN — FAMOTIDINE 20 MG: 20 TABLET ORAL at 08:07

## 2019-06-03 RX ADMIN — SODIUM CHLORIDE 75 ML/HR: 0.9 INJECTION, SOLUTION INTRAVENOUS at 16:10

## 2019-06-03 RX ADMIN — METOCLOPRAMIDE HYDROCHLORIDE 5 MG: 5 INJECTION INTRAMUSCULAR; INTRAVENOUS at 12:01

## 2019-06-03 RX ADMIN — AMLODIPINE BESYLATE 5 MG: 5 TABLET ORAL at 08:06

## 2019-06-03 RX ADMIN — HEPARIN SODIUM 5000 UNITS: 5000 INJECTION INTRAVENOUS; SUBCUTANEOUS at 13:38

## 2019-06-03 RX ADMIN — ASPIRIN 81 MG 81 MG: 81 TABLET ORAL at 17:43

## 2019-06-03 RX ADMIN — TRAZODONE HYDROCHLORIDE 50 MG: 50 TABLET ORAL at 21:57

## 2019-06-03 RX ADMIN — SUCRALFATE 1000 MG: 1 SUSPENSION ORAL at 05:21

## 2019-06-03 RX ADMIN — DORZOLAMIDE HYDROCHLORIDE AND TIMOLOL MALEATE 1 DROP: 20; 5 SOLUTION/ DROPS OPHTHALMIC at 08:07

## 2019-06-03 RX ADMIN — METOCLOPRAMIDE HYDROCHLORIDE 5 MG: 5 INJECTION INTRAMUSCULAR; INTRAVENOUS at 16:10

## 2019-06-03 RX ADMIN — ASPIRIN 81 MG 81 MG: 81 TABLET ORAL at 08:07

## 2019-06-03 RX ADMIN — SODIUM CHLORIDE 75 ML/HR: 0.9 INJECTION, SOLUTION INTRAVENOUS at 03:11

## 2019-06-03 RX ADMIN — DORZOLAMIDE HYDROCHLORIDE AND TIMOLOL MALEATE 1 DROP: 20; 5 SOLUTION/ DROPS OPHTHALMIC at 17:43

## 2019-06-03 RX ADMIN — Medication 1000 MG: at 17:43

## 2019-06-03 RX ADMIN — SENNOSIDES AND DOCUSATE SODIUM 1 TABLET: 8.6; 5 TABLET ORAL at 08:10

## 2019-06-03 RX ADMIN — HEPARIN SODIUM 5000 UNITS: 5000 INJECTION INTRAVENOUS; SUBCUTANEOUS at 05:21

## 2019-06-03 RX ADMIN — SUCRALFATE 1000 MG: 1 SUSPENSION ORAL at 17:43

## 2019-06-03 RX ADMIN — Medication 1000 MG: at 08:07

## 2019-06-03 RX ADMIN — PANTOPRAZOLE SODIUM 40 MG: 40 TABLET, DELAYED RELEASE ORAL at 05:21

## 2019-06-03 RX ADMIN — SUCRALFATE 1000 MG: 1 SUSPENSION ORAL at 12:01

## 2019-06-03 RX ADMIN — GABAPENTIN 100 MG: 100 CAPSULE ORAL at 08:07

## 2019-06-03 RX ADMIN — ERYTHROMYCIN: 5 OINTMENT OPHTHALMIC at 22:00

## 2019-06-03 RX ADMIN — HYDRALAZINE HYDROCHLORIDE 10 MG: 20 INJECTION INTRAMUSCULAR; INTRAVENOUS at 12:04

## 2019-06-03 RX ADMIN — HEPARIN SODIUM 5000 UNITS: 5000 INJECTION INTRAVENOUS; SUBCUTANEOUS at 21:57

## 2019-06-03 RX ADMIN — METOCLOPRAMIDE HYDROCHLORIDE 5 MG: 5 INJECTION INTRAMUSCULAR; INTRAVENOUS at 06:01

## 2019-06-03 RX ADMIN — AMIODARONE HYDROCHLORIDE 100 MG: 200 TABLET ORAL at 08:07

## 2019-06-03 RX ADMIN — LISINOPRIL 40 MG: 20 TABLET ORAL at 08:07

## 2019-06-03 RX ADMIN — ROPINIROLE HYDROCHLORIDE 2 MG: 1 TABLET, FILM COATED ORAL at 21:57

## 2019-06-04 PROCEDURE — 99232 SBSQ HOSP IP/OBS MODERATE 35: CPT | Performed by: INTERNAL MEDICINE

## 2019-06-04 PROCEDURE — 97530 THERAPEUTIC ACTIVITIES: CPT

## 2019-06-04 PROCEDURE — 97116 GAIT TRAINING THERAPY: CPT

## 2019-06-04 RX ORDER — AMLODIPINE BESYLATE 10 MG/1
10 TABLET ORAL DAILY
Status: DISCONTINUED | OUTPATIENT
Start: 2019-06-05 | End: 2019-06-08 | Stop reason: HOSPADM

## 2019-06-04 RX ORDER — POLYETHYLENE GLYCOL 3350 17 G/17G
17 POWDER, FOR SOLUTION ORAL DAILY PRN
Status: DISCONTINUED | OUTPATIENT
Start: 2019-06-04 | End: 2019-06-08 | Stop reason: HOSPADM

## 2019-06-04 RX ADMIN — METOCLOPRAMIDE HYDROCHLORIDE 5 MG: 5 INJECTION INTRAMUSCULAR; INTRAVENOUS at 17:15

## 2019-06-04 RX ADMIN — LISINOPRIL 40 MG: 20 TABLET ORAL at 08:12

## 2019-06-04 RX ADMIN — SUCRALFATE 1000 MG: 1 SUSPENSION ORAL at 00:25

## 2019-06-04 RX ADMIN — HEPARIN SODIUM 5000 UNITS: 5000 INJECTION INTRAVENOUS; SUBCUTANEOUS at 06:06

## 2019-06-04 RX ADMIN — ASPIRIN 81 MG 81 MG: 81 TABLET ORAL at 17:15

## 2019-06-04 RX ADMIN — DORZOLAMIDE HYDROCHLORIDE AND TIMOLOL MALEATE 1 DROP: 20; 5 SOLUTION/ DROPS OPHTHALMIC at 17:15

## 2019-06-04 RX ADMIN — HEPARIN SODIUM 5000 UNITS: 5000 INJECTION INTRAVENOUS; SUBCUTANEOUS at 21:07

## 2019-06-04 RX ADMIN — FAMOTIDINE 20 MG: 20 TABLET ORAL at 08:12

## 2019-06-04 RX ADMIN — HEPARIN SODIUM 5000 UNITS: 5000 INJECTION INTRAVENOUS; SUBCUTANEOUS at 13:32

## 2019-06-04 RX ADMIN — METOCLOPRAMIDE HYDROCHLORIDE 5 MG: 5 INJECTION INTRAMUSCULAR; INTRAVENOUS at 12:01

## 2019-06-04 RX ADMIN — Medication 1000 MG: at 08:12

## 2019-06-04 RX ADMIN — SUCRALFATE 1000 MG: 1 SUSPENSION ORAL at 17:14

## 2019-06-04 RX ADMIN — ERYTHROMYCIN: 5 OINTMENT OPHTHALMIC at 21:08

## 2019-06-04 RX ADMIN — TRAZODONE HYDROCHLORIDE 50 MG: 50 TABLET ORAL at 21:07

## 2019-06-04 RX ADMIN — SENNOSIDES AND DOCUSATE SODIUM 1 TABLET: 8.6; 5 TABLET ORAL at 08:12

## 2019-06-04 RX ADMIN — AMLODIPINE BESYLATE 5 MG: 5 TABLET ORAL at 08:12

## 2019-06-04 RX ADMIN — POLYETHYLENE GLYCOL 3350 17 G: 17 POWDER, FOR SOLUTION ORAL at 08:47

## 2019-06-04 RX ADMIN — ASPIRIN 81 MG 81 MG: 81 TABLET ORAL at 08:12

## 2019-06-04 RX ADMIN — GABAPENTIN 100 MG: 100 CAPSULE ORAL at 08:12

## 2019-06-04 RX ADMIN — SODIUM CHLORIDE 75 ML/HR: 0.9 INJECTION, SOLUTION INTRAVENOUS at 06:05

## 2019-06-04 RX ADMIN — PANTOPRAZOLE SODIUM 40 MG: 40 TABLET, DELAYED RELEASE ORAL at 06:05

## 2019-06-04 RX ADMIN — AMIODARONE HYDROCHLORIDE 100 MG: 200 TABLET ORAL at 08:12

## 2019-06-04 RX ADMIN — ROPINIROLE HYDROCHLORIDE 2 MG: 1 TABLET, FILM COATED ORAL at 21:07

## 2019-06-04 RX ADMIN — METOCLOPRAMIDE HYDROCHLORIDE 5 MG: 5 INJECTION INTRAMUSCULAR; INTRAVENOUS at 06:06

## 2019-06-04 RX ADMIN — HYDRALAZINE HYDROCHLORIDE 10 MG: 20 INJECTION INTRAMUSCULAR; INTRAVENOUS at 10:34

## 2019-06-04 RX ADMIN — Medication 1000 MG: at 17:15

## 2019-06-04 RX ADMIN — SUCRALFATE 1000 MG: 1 SUSPENSION ORAL at 12:01

## 2019-06-04 RX ADMIN — SUCRALFATE 1000 MG: 1 SUSPENSION ORAL at 06:05

## 2019-06-04 RX ADMIN — DORZOLAMIDE HYDROCHLORIDE AND TIMOLOL MALEATE 1 DROP: 20; 5 SOLUTION/ DROPS OPHTHALMIC at 08:13

## 2019-06-05 ENCOUNTER — APPOINTMENT (INPATIENT)
Dept: RADIOLOGY | Facility: HOSPITAL | Age: 84
DRG: 304 | End: 2019-06-05
Payer: MEDICARE

## 2019-06-05 PROBLEM — R07.89 CHEST DISCOMFORT: Status: ACTIVE | Noted: 2019-06-05

## 2019-06-05 LAB
ANION GAP SERPL CALCULATED.3IONS-SCNC: 10 MMOL/L (ref 4–13)
ATRIAL RATE: 79 BPM
BASOPHILS # BLD AUTO: 0.04 THOUSANDS/ΜL (ref 0–0.1)
BASOPHILS NFR BLD AUTO: 1 % (ref 0–1)
BUN SERPL-MCNC: 6 MG/DL (ref 5–25)
CALCIUM SERPL-MCNC: 8.7 MG/DL (ref 8.3–10.1)
CHLORIDE SERPL-SCNC: 100 MMOL/L (ref 100–108)
CO2 SERPL-SCNC: 23 MMOL/L (ref 21–32)
CREAT SERPL-MCNC: 0.84 MG/DL (ref 0.6–1.3)
EOSINOPHIL # BLD AUTO: 0.02 THOUSAND/ΜL (ref 0–0.61)
EOSINOPHIL NFR BLD AUTO: 0 % (ref 0–6)
ERYTHROCYTE [DISTWIDTH] IN BLOOD BY AUTOMATED COUNT: 15.5 % (ref 11.6–15.1)
GFR SERPL CREATININE-BSD FRML MDRD: 61 ML/MIN/1.73SQ M
GLUCOSE SERPL-MCNC: 94 MG/DL (ref 65–140)
HCT VFR BLD AUTO: 31.9 % (ref 34.8–46.1)
HGB BLD-MCNC: 11.1 G/DL (ref 11.5–15.4)
IMM GRANULOCYTES # BLD AUTO: 0.02 THOUSAND/UL (ref 0–0.2)
IMM GRANULOCYTES NFR BLD AUTO: 0 % (ref 0–2)
LYMPHOCYTES # BLD AUTO: 2.65 THOUSANDS/ΜL (ref 0.6–4.47)
LYMPHOCYTES NFR BLD AUTO: 44 % (ref 14–44)
MCH RBC QN AUTO: 32.6 PG (ref 26.8–34.3)
MCHC RBC AUTO-ENTMCNC: 34.8 G/DL (ref 31.4–37.4)
MCV RBC AUTO: 94 FL (ref 82–98)
METANEPH FREE SERPL-MCNC: 65 PG/ML (ref 0–62)
MONOCYTES # BLD AUTO: 0.5 THOUSAND/ΜL (ref 0.17–1.22)
MONOCYTES NFR BLD AUTO: 8 % (ref 4–12)
NEUTROPHILS # BLD AUTO: 2.83 THOUSANDS/ΜL (ref 1.85–7.62)
NEUTS SEG NFR BLD AUTO: 47 % (ref 43–75)
NORMETANEPHRINE SERPL-MCNC: 56 PG/ML (ref 0–145)
NRBC BLD AUTO-RTO: 0 /100 WBCS
P AXIS: 81 DEGREES
PLATELET # BLD AUTO: 236 THOUSANDS/UL (ref 149–390)
PMV BLD AUTO: 8.5 FL (ref 8.9–12.7)
POTASSIUM SERPL-SCNC: 3.7 MMOL/L (ref 3.5–5.3)
PR INTERVAL: 306 MS
QRS AXIS: -60 DEGREES
QRSD INTERVAL: 98 MS
QT INTERVAL: 398 MS
QTC INTERVAL: 456 MS
RBC # BLD AUTO: 3.41 MILLION/UL (ref 3.81–5.12)
RENIN PLAS-CCNC: 0.17 NG/ML/HR (ref 0.17–5.38)
SODIUM SERPL-SCNC: 133 MMOL/L (ref 136–145)
T WAVE AXIS: 84 DEGREES
TROPONIN I SERPL-MCNC: 0.02 NG/ML
TROPONIN I SERPL-MCNC: 0.02 NG/ML
TROPONIN I SERPL-MCNC: <0.02 NG/ML
VENTRICULAR RATE: 79 BPM
WBC # BLD AUTO: 6.06 THOUSAND/UL (ref 4.31–10.16)

## 2019-06-05 PROCEDURE — 80048 BASIC METABOLIC PNL TOTAL CA: CPT | Performed by: INTERNAL MEDICINE

## 2019-06-05 PROCEDURE — 85025 COMPLETE CBC W/AUTO DIFF WBC: CPT | Performed by: INTERNAL MEDICINE

## 2019-06-05 PROCEDURE — 93010 ELECTROCARDIOGRAM REPORT: CPT | Performed by: INTERNAL MEDICINE

## 2019-06-05 PROCEDURE — 74241 HB X-RAYUPPER GI DELAY W/KUB: CPT

## 2019-06-05 PROCEDURE — 93005 ELECTROCARDIOGRAM TRACING: CPT

## 2019-06-05 PROCEDURE — 84484 ASSAY OF TROPONIN QUANT: CPT | Performed by: INTERNAL MEDICINE

## 2019-06-05 PROCEDURE — 99232 SBSQ HOSP IP/OBS MODERATE 35: CPT | Performed by: INTERNAL MEDICINE

## 2019-06-05 RX ORDER — LABETALOL 20 MG/4 ML (5 MG/ML) INTRAVENOUS SYRINGE
10 EVERY 4 HOURS PRN
Status: DISCONTINUED | OUTPATIENT
Start: 2019-06-05 | End: 2019-06-05

## 2019-06-05 RX ORDER — SODIUM CHLORIDE 9 MG/ML
50 INJECTION, SOLUTION INTRAVENOUS CONTINUOUS
Status: DISCONTINUED | OUTPATIENT
Start: 2019-06-05 | End: 2019-06-06

## 2019-06-05 RX ADMIN — HEPARIN SODIUM 5000 UNITS: 5000 INJECTION INTRAVENOUS; SUBCUTANEOUS at 05:38

## 2019-06-05 RX ADMIN — SENNOSIDES AND DOCUSATE SODIUM 1 TABLET: 8.6; 5 TABLET ORAL at 17:26

## 2019-06-05 RX ADMIN — SODIUM CHLORIDE 50 ML/HR: 0.9 INJECTION, SOLUTION INTRAVENOUS at 10:07

## 2019-06-05 RX ADMIN — SODIUM CHLORIDE 50 ML/HR: 0.9 INJECTION, SOLUTION INTRAVENOUS at 12:57

## 2019-06-05 RX ADMIN — AMIODARONE HYDROCHLORIDE 100 MG: 200 TABLET ORAL at 10:05

## 2019-06-05 RX ADMIN — PANTOPRAZOLE SODIUM 40 MG: 40 TABLET, DELAYED RELEASE ORAL at 05:41

## 2019-06-05 RX ADMIN — Medication 1000 MG: at 10:04

## 2019-06-05 RX ADMIN — ASPIRIN 81 MG 81 MG: 81 TABLET ORAL at 17:26

## 2019-06-05 RX ADMIN — ASPIRIN 81 MG 81 MG: 81 TABLET ORAL at 10:05

## 2019-06-05 RX ADMIN — FAMOTIDINE 20 MG: 20 TABLET ORAL at 10:03

## 2019-06-05 RX ADMIN — SUCRALFATE 1000 MG: 1 SUSPENSION ORAL at 05:41

## 2019-06-05 RX ADMIN — DORZOLAMIDE HYDROCHLORIDE AND TIMOLOL MALEATE 1 DROP: 20; 5 SOLUTION/ DROPS OPHTHALMIC at 17:28

## 2019-06-05 RX ADMIN — POLYETHYLENE GLYCOL 3350 17 G: 17 POWDER, FOR SOLUTION ORAL at 17:26

## 2019-06-05 RX ADMIN — TRAZODONE HYDROCHLORIDE 50 MG: 50 TABLET ORAL at 21:54

## 2019-06-05 RX ADMIN — ROPINIROLE HYDROCHLORIDE 2 MG: 1 TABLET, FILM COATED ORAL at 21:53

## 2019-06-05 RX ADMIN — AMLODIPINE BESYLATE 10 MG: 10 TABLET ORAL at 10:04

## 2019-06-05 RX ADMIN — SUCRALFATE 1000 MG: 1 SUSPENSION ORAL at 17:26

## 2019-06-05 RX ADMIN — HEPARIN SODIUM 5000 UNITS: 5000 INJECTION INTRAVENOUS; SUBCUTANEOUS at 14:34

## 2019-06-05 RX ADMIN — METOCLOPRAMIDE HYDROCHLORIDE 5 MG: 5 INJECTION INTRAMUSCULAR; INTRAVENOUS at 12:52

## 2019-06-05 RX ADMIN — SUCRALFATE 1000 MG: 1 SUSPENSION ORAL at 00:12

## 2019-06-05 RX ADMIN — HEPARIN SODIUM 5000 UNITS: 5000 INJECTION INTRAVENOUS; SUBCUTANEOUS at 21:54

## 2019-06-05 RX ADMIN — DORZOLAMIDE HYDROCHLORIDE AND TIMOLOL MALEATE 1 DROP: 20; 5 SOLUTION/ DROPS OPHTHALMIC at 10:06

## 2019-06-05 RX ADMIN — LISINOPRIL 40 MG: 20 TABLET ORAL at 10:06

## 2019-06-05 RX ADMIN — GABAPENTIN 100 MG: 100 CAPSULE ORAL at 10:04

## 2019-06-05 RX ADMIN — SUCRALFATE 1000 MG: 1 SUSPENSION ORAL at 12:53

## 2019-06-05 RX ADMIN — METOCLOPRAMIDE HYDROCHLORIDE 5 MG: 5 INJECTION INTRAMUSCULAR; INTRAVENOUS at 06:00

## 2019-06-05 RX ADMIN — HYDRALAZINE HYDROCHLORIDE 10 MG: 20 INJECTION INTRAMUSCULAR; INTRAVENOUS at 12:59

## 2019-06-05 RX ADMIN — Medication 1000 MG: at 17:26

## 2019-06-06 LAB
ANION GAP SERPL CALCULATED.3IONS-SCNC: 10 MMOL/L (ref 4–13)
BASOPHILS # BLD AUTO: 0.06 THOUSANDS/ΜL (ref 0–0.1)
BASOPHILS NFR BLD AUTO: 1 % (ref 0–1)
BUN SERPL-MCNC: 8 MG/DL (ref 5–25)
CALCIUM SERPL-MCNC: 8.6 MG/DL (ref 8.3–10.1)
CHLORIDE SERPL-SCNC: 96 MMOL/L (ref 100–108)
CO2 SERPL-SCNC: 22 MMOL/L (ref 21–32)
CREAT SERPL-MCNC: 0.94 MG/DL (ref 0.6–1.3)
EOSINOPHIL # BLD AUTO: 0.03 THOUSAND/ΜL (ref 0–0.61)
EOSINOPHIL NFR BLD AUTO: 0 % (ref 0–6)
ERYTHROCYTE [DISTWIDTH] IN BLOOD BY AUTOMATED COUNT: 15.2 % (ref 11.6–15.1)
GFR SERPL CREATININE-BSD FRML MDRD: 53 ML/MIN/1.73SQ M
GLUCOSE SERPL-MCNC: 103 MG/DL (ref 65–140)
HCT VFR BLD AUTO: 34.7 % (ref 34.8–46.1)
HGB BLD-MCNC: 11.9 G/DL (ref 11.5–15.4)
IMM GRANULOCYTES # BLD AUTO: 0.02 THOUSAND/UL (ref 0–0.2)
IMM GRANULOCYTES NFR BLD AUTO: 0 % (ref 0–2)
LYMPHOCYTES # BLD AUTO: 3.21 THOUSANDS/ΜL (ref 0.6–4.47)
LYMPHOCYTES NFR BLD AUTO: 45 % (ref 14–44)
MCH RBC QN AUTO: 31.8 PG (ref 26.8–34.3)
MCHC RBC AUTO-ENTMCNC: 34.3 G/DL (ref 31.4–37.4)
MCV RBC AUTO: 93 FL (ref 82–98)
MONOCYTES # BLD AUTO: 0.61 THOUSAND/ΜL (ref 0.17–1.22)
MONOCYTES NFR BLD AUTO: 9 % (ref 4–12)
NEUTROPHILS # BLD AUTO: 3.24 THOUSANDS/ΜL (ref 1.85–7.62)
NEUTS SEG NFR BLD AUTO: 45 % (ref 43–75)
NRBC BLD AUTO-RTO: 0 /100 WBCS
PLATELET # BLD AUTO: 251 THOUSANDS/UL (ref 149–390)
PMV BLD AUTO: 8.6 FL (ref 8.9–12.7)
POTASSIUM SERPL-SCNC: 4 MMOL/L (ref 3.5–5.3)
RBC # BLD AUTO: 3.74 MILLION/UL (ref 3.81–5.12)
SODIUM SERPL-SCNC: 128 MMOL/L (ref 136–145)
WBC # BLD AUTO: 7.17 THOUSAND/UL (ref 4.31–10.16)

## 2019-06-06 PROCEDURE — 80048 BASIC METABOLIC PNL TOTAL CA: CPT | Performed by: INTERNAL MEDICINE

## 2019-06-06 PROCEDURE — 99222 1ST HOSP IP/OBS MODERATE 55: CPT | Performed by: INTERNAL MEDICINE

## 2019-06-06 PROCEDURE — 99232 SBSQ HOSP IP/OBS MODERATE 35: CPT | Performed by: INTERNAL MEDICINE

## 2019-06-06 PROCEDURE — 85025 COMPLETE CBC W/AUTO DIFF WBC: CPT | Performed by: INTERNAL MEDICINE

## 2019-06-06 RX ORDER — SODIUM CHLORIDE 1000 MG
2 TABLET, SOLUBLE MISCELLANEOUS
Status: DISCONTINUED | OUTPATIENT
Start: 2019-06-06 | End: 2019-06-08 | Stop reason: HOSPADM

## 2019-06-06 RX ADMIN — LISINOPRIL 40 MG: 20 TABLET ORAL at 08:36

## 2019-06-06 RX ADMIN — HEPARIN SODIUM 5000 UNITS: 5000 INJECTION INTRAVENOUS; SUBCUTANEOUS at 13:30

## 2019-06-06 RX ADMIN — ERYTHROMYCIN: 5 OINTMENT OPHTHALMIC at 00:10

## 2019-06-06 RX ADMIN — ROPINIROLE HYDROCHLORIDE 2 MG: 1 TABLET, FILM COATED ORAL at 22:16

## 2019-06-06 RX ADMIN — Medication 1000 MG: at 08:36

## 2019-06-06 RX ADMIN — DORZOLAMIDE HYDROCHLORIDE AND TIMOLOL MALEATE 1 DROP: 20; 5 SOLUTION/ DROPS OPHTHALMIC at 08:40

## 2019-06-06 RX ADMIN — AMIODARONE HYDROCHLORIDE 100 MG: 200 TABLET ORAL at 08:36

## 2019-06-06 RX ADMIN — SODIUM CHLORIDE 50 ML/HR: 0.9 INJECTION, SOLUTION INTRAVENOUS at 12:09

## 2019-06-06 RX ADMIN — ERYTHROMYCIN: 5 OINTMENT OPHTHALMIC at 22:17

## 2019-06-06 RX ADMIN — TRAZODONE HYDROCHLORIDE 50 MG: 50 TABLET ORAL at 22:16

## 2019-06-06 RX ADMIN — SUCRALFATE 1000 MG: 1 SUSPENSION ORAL at 05:56

## 2019-06-06 RX ADMIN — HEPARIN SODIUM 5000 UNITS: 5000 INJECTION INTRAVENOUS; SUBCUTANEOUS at 22:16

## 2019-06-06 RX ADMIN — METOCLOPRAMIDE HYDROCHLORIDE 5 MG: 5 INJECTION INTRAMUSCULAR; INTRAVENOUS at 06:44

## 2019-06-06 RX ADMIN — METOCLOPRAMIDE HYDROCHLORIDE 5 MG: 5 INJECTION INTRAMUSCULAR; INTRAVENOUS at 12:06

## 2019-06-06 RX ADMIN — ASPIRIN 81 MG 81 MG: 81 TABLET ORAL at 08:36

## 2019-06-06 RX ADMIN — HEPARIN SODIUM 5000 UNITS: 5000 INJECTION INTRAVENOUS; SUBCUTANEOUS at 05:56

## 2019-06-06 RX ADMIN — AMLODIPINE BESYLATE 10 MG: 10 TABLET ORAL at 08:36

## 2019-06-06 RX ADMIN — SUCRALFATE 1000 MG: 1 SUSPENSION ORAL at 23:54

## 2019-06-06 RX ADMIN — DORZOLAMIDE HYDROCHLORIDE AND TIMOLOL MALEATE 1 DROP: 20; 5 SOLUTION/ DROPS OPHTHALMIC at 17:06

## 2019-06-06 RX ADMIN — SUCRALFATE 1000 MG: 1 SUSPENSION ORAL at 00:10

## 2019-06-06 RX ADMIN — PANTOPRAZOLE SODIUM 40 MG: 40 TABLET, DELAYED RELEASE ORAL at 05:56

## 2019-06-06 RX ADMIN — SUCRALFATE 1000 MG: 1 SUSPENSION ORAL at 17:01

## 2019-06-06 RX ADMIN — Medication 1000 MG: at 17:01

## 2019-06-06 RX ADMIN — METOCLOPRAMIDE HYDROCHLORIDE 5 MG: 5 INJECTION INTRAMUSCULAR; INTRAVENOUS at 16:58

## 2019-06-06 RX ADMIN — GABAPENTIN 100 MG: 100 CAPSULE ORAL at 08:36

## 2019-06-06 RX ADMIN — SENNOSIDES AND DOCUSATE SODIUM 1 TABLET: 8.6; 5 TABLET ORAL at 08:37

## 2019-06-06 RX ADMIN — SUCRALFATE 1000 MG: 1 SUSPENSION ORAL at 12:07

## 2019-06-06 RX ADMIN — ASPIRIN 81 MG 81 MG: 81 TABLET ORAL at 17:01

## 2019-06-06 RX ADMIN — SODIUM CHLORIDE TAB 1 GM 2 G: 1 TAB at 17:01

## 2019-06-06 RX ADMIN — FAMOTIDINE 20 MG: 20 TABLET ORAL at 08:36

## 2019-06-07 ENCOUNTER — APPOINTMENT (INPATIENT)
Dept: RADIOLOGY | Facility: HOSPITAL | Age: 84
DRG: 304 | End: 2019-06-07
Payer: MEDICARE

## 2019-06-07 LAB
ANION GAP SERPL CALCULATED.3IONS-SCNC: 9 MMOL/L (ref 4–13)
BASOPHILS # BLD AUTO: 0.05 THOUSANDS/ΜL (ref 0–0.1)
BASOPHILS NFR BLD AUTO: 1 % (ref 0–1)
BUN SERPL-MCNC: 7 MG/DL (ref 5–25)
CALCIUM SERPL-MCNC: 8.4 MG/DL (ref 8.3–10.1)
CHLORIDE SERPL-SCNC: 99 MMOL/L (ref 100–108)
CO2 SERPL-SCNC: 23 MMOL/L (ref 21–32)
CREAT SERPL-MCNC: 0.82 MG/DL (ref 0.6–1.3)
EOSINOPHIL # BLD AUTO: 0.02 THOUSAND/ΜL (ref 0–0.61)
EOSINOPHIL NFR BLD AUTO: 0 % (ref 0–6)
ERYTHROCYTE [DISTWIDTH] IN BLOOD BY AUTOMATED COUNT: 15 % (ref 11.6–15.1)
GFR SERPL CREATININE-BSD FRML MDRD: 62 ML/MIN/1.73SQ M
GLUCOSE SERPL-MCNC: 92 MG/DL (ref 65–140)
HCT VFR BLD AUTO: 31.8 % (ref 34.8–46.1)
HGB BLD-MCNC: 11 G/DL (ref 11.5–15.4)
IMM GRANULOCYTES # BLD AUTO: 0.02 THOUSAND/UL (ref 0–0.2)
IMM GRANULOCYTES NFR BLD AUTO: 0 % (ref 0–2)
LYMPHOCYTES # BLD AUTO: 2.7 THOUSANDS/ΜL (ref 0.6–4.47)
LYMPHOCYTES NFR BLD AUTO: 47 % (ref 14–44)
MCH RBC QN AUTO: 32.5 PG (ref 26.8–34.3)
MCHC RBC AUTO-ENTMCNC: 34.6 G/DL (ref 31.4–37.4)
MCV RBC AUTO: 94 FL (ref 82–98)
MONOCYTES # BLD AUTO: 0.49 THOUSAND/ΜL (ref 0.17–1.22)
MONOCYTES NFR BLD AUTO: 9 % (ref 4–12)
NEUTROPHILS # BLD AUTO: 2.51 THOUSANDS/ΜL (ref 1.85–7.62)
NEUTS SEG NFR BLD AUTO: 43 % (ref 43–75)
NRBC BLD AUTO-RTO: 0 /100 WBCS
PLATELET # BLD AUTO: 239 THOUSANDS/UL (ref 149–390)
PMV BLD AUTO: 8.4 FL (ref 8.9–12.7)
POTASSIUM SERPL-SCNC: 3.8 MMOL/L (ref 3.5–5.3)
RBC # BLD AUTO: 3.38 MILLION/UL (ref 3.81–5.12)
SODIUM SERPL-SCNC: 131 MMOL/L (ref 136–145)
WBC # BLD AUTO: 5.79 THOUSAND/UL (ref 4.31–10.16)

## 2019-06-07 PROCEDURE — 85025 COMPLETE CBC W/AUTO DIFF WBC: CPT | Performed by: INTERNAL MEDICINE

## 2019-06-07 PROCEDURE — 73030 X-RAY EXAM OF SHOULDER: CPT

## 2019-06-07 PROCEDURE — 80048 BASIC METABOLIC PNL TOTAL CA: CPT | Performed by: INTERNAL MEDICINE

## 2019-06-07 PROCEDURE — 99232 SBSQ HOSP IP/OBS MODERATE 35: CPT | Performed by: INTERNAL MEDICINE

## 2019-06-07 PROCEDURE — 97116 GAIT TRAINING THERAPY: CPT

## 2019-06-07 RX ADMIN — FAMOTIDINE 20 MG: 20 TABLET ORAL at 08:35

## 2019-06-07 RX ADMIN — SODIUM CHLORIDE TAB 1 GM 2 G: 1 TAB at 08:35

## 2019-06-07 RX ADMIN — Medication 1000 MG: at 17:40

## 2019-06-07 RX ADMIN — GABAPENTIN 100 MG: 100 CAPSULE ORAL at 08:35

## 2019-06-07 RX ADMIN — AMLODIPINE BESYLATE 10 MG: 10 TABLET ORAL at 08:36

## 2019-06-07 RX ADMIN — HEPARIN SODIUM 5000 UNITS: 5000 INJECTION INTRAVENOUS; SUBCUTANEOUS at 13:31

## 2019-06-07 RX ADMIN — ASPIRIN 81 MG 81 MG: 81 TABLET ORAL at 08:35

## 2019-06-07 RX ADMIN — LISINOPRIL 40 MG: 20 TABLET ORAL at 08:35

## 2019-06-07 RX ADMIN — METOCLOPRAMIDE HYDROCHLORIDE 5 MG: 5 INJECTION INTRAMUSCULAR; INTRAVENOUS at 17:38

## 2019-06-07 RX ADMIN — SODIUM CHLORIDE TAB 1 GM 2 G: 1 TAB at 17:38

## 2019-06-07 RX ADMIN — HEPARIN SODIUM 5000 UNITS: 5000 INJECTION INTRAVENOUS; SUBCUTANEOUS at 05:39

## 2019-06-07 RX ADMIN — Medication 1000 MG: at 08:35

## 2019-06-07 RX ADMIN — AMIODARONE HYDROCHLORIDE 100 MG: 200 TABLET ORAL at 08:35

## 2019-06-07 RX ADMIN — SUCRALFATE 1000 MG: 1 SUSPENSION ORAL at 23:10

## 2019-06-07 RX ADMIN — HEPARIN SODIUM 5000 UNITS: 5000 INJECTION INTRAVENOUS; SUBCUTANEOUS at 23:10

## 2019-06-07 RX ADMIN — DORZOLAMIDE HYDROCHLORIDE AND TIMOLOL MALEATE 1 DROP: 20; 5 SOLUTION/ DROPS OPHTHALMIC at 08:41

## 2019-06-07 RX ADMIN — ROPINIROLE HYDROCHLORIDE 2 MG: 1 TABLET, FILM COATED ORAL at 23:11

## 2019-06-07 RX ADMIN — DORZOLAMIDE HYDROCHLORIDE AND TIMOLOL MALEATE 1 DROP: 20; 5 SOLUTION/ DROPS OPHTHALMIC at 17:43

## 2019-06-07 RX ADMIN — SUCRALFATE 1000 MG: 1 SUSPENSION ORAL at 12:38

## 2019-06-07 RX ADMIN — ASPIRIN 81 MG 81 MG: 81 TABLET ORAL at 17:40

## 2019-06-07 RX ADMIN — TRAZODONE HYDROCHLORIDE 50 MG: 50 TABLET ORAL at 23:10

## 2019-06-07 RX ADMIN — SUCRALFATE 1000 MG: 1 SUSPENSION ORAL at 17:40

## 2019-06-07 RX ADMIN — SUCRALFATE 1000 MG: 1 SUSPENSION ORAL at 05:39

## 2019-06-07 RX ADMIN — PANTOPRAZOLE SODIUM 40 MG: 40 TABLET, DELAYED RELEASE ORAL at 05:39

## 2019-06-07 RX ADMIN — METOCLOPRAMIDE HYDROCHLORIDE 5 MG: 5 INJECTION INTRAMUSCULAR; INTRAVENOUS at 12:41

## 2019-06-07 RX ADMIN — SODIUM CHLORIDE TAB 1 GM 2 G: 1 TAB at 12:38

## 2019-06-07 RX ADMIN — METOCLOPRAMIDE HYDROCHLORIDE 5 MG: 5 INJECTION INTRAMUSCULAR; INTRAVENOUS at 06:13

## 2019-06-08 VITALS
BODY MASS INDEX: 28.63 KG/M2 | HEART RATE: 73 BPM | RESPIRATION RATE: 18 BRPM | DIASTOLIC BLOOD PRESSURE: 90 MMHG | OXYGEN SATURATION: 96 % | SYSTOLIC BLOOD PRESSURE: 160 MMHG | WEIGHT: 141.76 LBS | TEMPERATURE: 97.8 F

## 2019-06-08 PROCEDURE — 97110 THERAPEUTIC EXERCISES: CPT

## 2019-06-08 PROCEDURE — 20610 DRAIN/INJ JOINT/BURSA W/O US: CPT | Performed by: SURGERY

## 2019-06-08 PROCEDURE — 0R9J3ZZ DRAINAGE OF RIGHT SHOULDER JOINT, PERCUTANEOUS APPROACH: ICD-10-PCS | Performed by: INTERNAL MEDICINE

## 2019-06-08 PROCEDURE — 99221 1ST HOSP IP/OBS SF/LOW 40: CPT | Performed by: SURGERY

## 2019-06-08 PROCEDURE — 99239 HOSP IP/OBS DSCHRG MGMT >30: CPT | Performed by: INTERNAL MEDICINE

## 2019-06-08 PROCEDURE — 97535 SELF CARE MNGMENT TRAINING: CPT

## 2019-06-08 PROCEDURE — 97530 THERAPEUTIC ACTIVITIES: CPT

## 2019-06-08 RX ORDER — SODIUM CHLORIDE 1000 MG
2 TABLET, SOLUBLE MISCELLANEOUS
Qty: 30 TABLET | Refills: 0 | Status: SHIPPED | OUTPATIENT
Start: 2019-06-08 | End: 2019-06-19

## 2019-06-08 RX ORDER — ACETAMINOPHEN 325 MG/1
650 TABLET ORAL EVERY 6 HOURS PRN
Qty: 30 TABLET | Refills: 0 | Status: SHIPPED | OUTPATIENT
Start: 2019-06-08

## 2019-06-08 RX ORDER — HYDRALAZINE HYDROCHLORIDE 10 MG/1
10 TABLET, FILM COATED ORAL 2 TIMES DAILY PRN
Qty: 15 TABLET | Refills: 0 | Status: SHIPPED | OUTPATIENT
Start: 2019-06-08 | End: 2019-06-11 | Stop reason: SDUPTHER

## 2019-06-08 RX ORDER — AMLODIPINE BESYLATE 10 MG/1
10 TABLET ORAL DAILY
Qty: 30 TABLET | Refills: 0 | Status: SHIPPED | OUTPATIENT
Start: 2019-06-09 | End: 2019-06-11 | Stop reason: SDUPTHER

## 2019-06-08 RX ADMIN — HEPARIN SODIUM 5000 UNITS: 5000 INJECTION INTRAVENOUS; SUBCUTANEOUS at 05:52

## 2019-06-08 RX ADMIN — Medication 1000 MG: at 08:08

## 2019-06-08 RX ADMIN — GABAPENTIN 100 MG: 100 CAPSULE ORAL at 08:08

## 2019-06-08 RX ADMIN — PANTOPRAZOLE SODIUM 40 MG: 40 TABLET, DELAYED RELEASE ORAL at 05:52

## 2019-06-08 RX ADMIN — LISINOPRIL 40 MG: 20 TABLET ORAL at 08:09

## 2019-06-08 RX ADMIN — DORZOLAMIDE HYDROCHLORIDE AND TIMOLOL MALEATE 1 DROP: 20; 5 SOLUTION/ DROPS OPHTHALMIC at 08:09

## 2019-06-08 RX ADMIN — AMIODARONE HYDROCHLORIDE 100 MG: 200 TABLET ORAL at 08:08

## 2019-06-08 RX ADMIN — ASPIRIN 81 MG 81 MG: 81 TABLET ORAL at 08:08

## 2019-06-08 RX ADMIN — METOCLOPRAMIDE HYDROCHLORIDE 5 MG: 5 INJECTION INTRAMUSCULAR; INTRAVENOUS at 06:01

## 2019-06-08 RX ADMIN — METOCLOPRAMIDE HYDROCHLORIDE 5 MG: 5 INJECTION INTRAMUSCULAR; INTRAVENOUS at 11:25

## 2019-06-08 RX ADMIN — SODIUM CHLORIDE TAB 1 GM 2 G: 1 TAB at 11:25

## 2019-06-08 RX ADMIN — AMLODIPINE BESYLATE 10 MG: 10 TABLET ORAL at 08:08

## 2019-06-08 RX ADMIN — SUCRALFATE 1000 MG: 1 SUSPENSION ORAL at 11:25

## 2019-06-08 RX ADMIN — SUCRALFATE 1000 MG: 1 SUSPENSION ORAL at 05:52

## 2019-06-08 RX ADMIN — FAMOTIDINE 20 MG: 20 TABLET ORAL at 08:08

## 2019-06-08 RX ADMIN — SODIUM CHLORIDE TAB 1 GM 2 G: 1 TAB at 08:08

## 2019-06-10 ENCOUNTER — TRANSITIONAL CARE MANAGEMENT (OUTPATIENT)
Dept: FAMILY MEDICINE CLINIC | Facility: CLINIC | Age: 84
End: 2019-06-10

## 2019-06-11 ENCOUNTER — OFFICE VISIT (OUTPATIENT)
Dept: FAMILY MEDICINE CLINIC | Facility: CLINIC | Age: 84
End: 2019-06-11
Payer: MEDICARE

## 2019-06-11 VITALS
OXYGEN SATURATION: 98 % | BODY MASS INDEX: 27.13 KG/M2 | SYSTOLIC BLOOD PRESSURE: 150 MMHG | HEART RATE: 69 BPM | WEIGHT: 134.6 LBS | DIASTOLIC BLOOD PRESSURE: 76 MMHG | TEMPERATURE: 97.9 F | HEIGHT: 59 IN | RESPIRATION RATE: 18 BRPM

## 2019-06-11 DIAGNOSIS — M25.511 ACUTE PAIN OF RIGHT SHOULDER: ICD-10-CM

## 2019-06-11 DIAGNOSIS — N17.9 AKI (ACUTE KIDNEY INJURY) (HCC): ICD-10-CM

## 2019-06-11 DIAGNOSIS — M19.011 ARTHRITIS OF RIGHT SHOULDER REGION: ICD-10-CM

## 2019-06-11 DIAGNOSIS — G62.9 PERIPHERAL NEUROPATHY: ICD-10-CM

## 2019-06-11 DIAGNOSIS — K59.03 CONSTIPATION DUE TO PAIN MEDICATION: Chronic | ICD-10-CM

## 2019-06-11 DIAGNOSIS — E87.1 HYPONATREMIA: ICD-10-CM

## 2019-06-11 DIAGNOSIS — F51.01 PRIMARY INSOMNIA: ICD-10-CM

## 2019-06-11 DIAGNOSIS — Z91.89 AT RISK FOR DEHYDRATION DUE TO POOR FLUID INTAKE: ICD-10-CM

## 2019-06-11 DIAGNOSIS — I16.0 HYPERTENSIVE URGENCY: Primary | ICD-10-CM

## 2019-06-11 DIAGNOSIS — I48.0 PAROXYSMAL ATRIAL FIBRILLATION (HCC): Chronic | ICD-10-CM

## 2019-06-11 PROBLEM — R11.2 NAUSEA & VOMITING: Status: RESOLVED | Noted: 2019-05-06 | Resolved: 2019-06-11

## 2019-06-11 PROCEDURE — 1111F DSCHRG MED/CURRENT MED MERGE: CPT | Performed by: FAMILY MEDICINE

## 2019-06-11 PROCEDURE — 20610 DRAIN/INJ JOINT/BURSA W/O US: CPT | Performed by: FAMILY MEDICINE

## 2019-06-11 PROCEDURE — 99496 TRANSJ CARE MGMT HIGH F2F 7D: CPT | Performed by: FAMILY MEDICINE

## 2019-06-11 RX ORDER — HYDRALAZINE HYDROCHLORIDE 10 MG/1
10 TABLET, FILM COATED ORAL 2 TIMES DAILY PRN
Qty: 180 TABLET | Refills: 0 | Status: SHIPPED | OUTPATIENT
Start: 2019-06-11

## 2019-06-11 RX ORDER — HYDROCODONE BITARTRATE AND ACETAMINOPHEN 5; 325 MG/1; MG/1
1 TABLET ORAL EVERY 6 HOURS PRN
Qty: 90 TABLET | Refills: 0 | Status: SHIPPED | OUTPATIENT
Start: 2019-06-11 | End: 2019-09-11 | Stop reason: SDUPTHER

## 2019-06-11 RX ORDER — FAMOTIDINE 20 MG/1
20 TABLET, FILM COATED ORAL 2 TIMES DAILY
Qty: 180 TABLET | Refills: 3 | Status: SHIPPED | OUTPATIENT
Start: 2019-06-11 | End: 2020-09-16 | Stop reason: SDUPTHER

## 2019-06-11 RX ORDER — AMLODIPINE BESYLATE 10 MG/1
10 TABLET ORAL DAILY
Qty: 90 TABLET | Refills: 3 | Status: SHIPPED | OUTPATIENT
Start: 2019-06-11 | End: 2020-03-27

## 2019-06-11 RX ORDER — AMOXICILLIN 250 MG
2 CAPSULE ORAL 2 TIMES DAILY
Qty: 120 TABLET | Refills: 3 | Status: SHIPPED | OUTPATIENT
Start: 2019-06-11 | End: 2020-01-30 | Stop reason: HOSPADM

## 2019-06-11 RX ORDER — GABAPENTIN 100 MG/1
100 CAPSULE ORAL 3 TIMES DAILY
Qty: 270 CAPSULE | Refills: 3 | Status: SHIPPED | OUTPATIENT
Start: 2019-06-11 | End: 2020-01-06 | Stop reason: SDUPTHER

## 2019-06-11 RX ORDER — LISINOPRIL 40 MG/1
40 TABLET ORAL DAILY
Qty: 30 TABLET | Refills: 1 | Status: SHIPPED | OUTPATIENT
Start: 2019-06-11 | End: 2019-09-11 | Stop reason: SDUPTHER

## 2019-06-11 RX ORDER — ALPRAZOLAM 0.25 MG/1
0.25 TABLET ORAL 2 TIMES DAILY PRN
Qty: 60 TABLET | Refills: 5 | Status: SHIPPED | OUTPATIENT
Start: 2019-06-11 | End: 2020-01-06 | Stop reason: SDUPTHER

## 2019-06-13 RX ORDER — LIDOCAINE HYDROCHLORIDE 10 MG/ML
1 INJECTION, SOLUTION INFILTRATION; PERINEURAL
Status: COMPLETED | OUTPATIENT
Start: 2019-06-13 | End: 2019-06-13

## 2019-06-13 RX ORDER — METHYLPREDNISOLONE ACETATE 40 MG/ML
1 INJECTION, SUSPENSION INTRA-ARTICULAR; INTRALESIONAL; INTRAMUSCULAR; SOFT TISSUE
Status: COMPLETED | OUTPATIENT
Start: 2019-06-13 | End: 2019-06-13

## 2019-06-13 RX ADMIN — METHYLPREDNISOLONE ACETATE 1 ML: 40 INJECTION, SUSPENSION INTRA-ARTICULAR; INTRALESIONAL; INTRAMUSCULAR; SOFT TISSUE at 00:52

## 2019-06-13 RX ADMIN — LIDOCAINE HYDROCHLORIDE 1 ML: 10 INJECTION, SOLUTION INFILTRATION; PERINEURAL at 00:52

## 2019-06-17 ENCOUNTER — APPOINTMENT (OUTPATIENT)
Dept: LAB | Facility: CLINIC | Age: 84
End: 2019-06-17
Payer: MEDICARE

## 2019-06-17 DIAGNOSIS — R07.9 CHEST PAIN: Chronic | ICD-10-CM

## 2019-06-17 DIAGNOSIS — I16.0 HYPERTENSIVE URGENCY: ICD-10-CM

## 2019-06-17 DIAGNOSIS — E87.1 HYPONATREMIA: ICD-10-CM

## 2019-06-17 LAB
ANION GAP SERPL CALCULATED.3IONS-SCNC: 9 MMOL/L (ref 4–13)
BUN SERPL-MCNC: 22 MG/DL (ref 5–25)
CALCIUM SERPL-MCNC: 8.9 MG/DL (ref 8.3–10.1)
CHLORIDE SERPL-SCNC: 101 MMOL/L (ref 100–108)
CO2 SERPL-SCNC: 26 MMOL/L (ref 21–32)
CREAT SERPL-MCNC: 0.95 MG/DL (ref 0.6–1.3)
GFR SERPL CREATININE-BSD FRML MDRD: 52 ML/MIN/1.73SQ M
GLUCOSE P FAST SERPL-MCNC: 90 MG/DL (ref 65–99)
MAGNESIUM SERPL-MCNC: 2 MG/DL (ref 1.6–2.6)
POTASSIUM SERPL-SCNC: 4.2 MMOL/L (ref 3.5–5.3)
SODIUM SERPL-SCNC: 136 MMOL/L (ref 136–145)

## 2019-06-17 PROCEDURE — 83735 ASSAY OF MAGNESIUM: CPT

## 2019-06-17 PROCEDURE — 36415 COLL VENOUS BLD VENIPUNCTURE: CPT

## 2019-06-17 PROCEDURE — 80048 BASIC METABOLIC PNL TOTAL CA: CPT

## 2019-06-19 ENCOUNTER — OFFICE VISIT (OUTPATIENT)
Dept: NEPHROLOGY | Facility: CLINIC | Age: 84
End: 2019-06-19
Payer: MEDICARE

## 2019-06-19 VITALS
HEART RATE: 80 BPM | HEIGHT: 56 IN | RESPIRATION RATE: 16 BRPM | WEIGHT: 135 LBS | DIASTOLIC BLOOD PRESSURE: 80 MMHG | SYSTOLIC BLOOD PRESSURE: 136 MMHG | BODY MASS INDEX: 30.37 KG/M2

## 2019-06-19 DIAGNOSIS — I10 ESSENTIAL HYPERTENSION: ICD-10-CM

## 2019-06-19 DIAGNOSIS — I16.0 HYPERTENSIVE URGENCY: ICD-10-CM

## 2019-06-19 DIAGNOSIS — E87.1 HYPONATREMIA: Primary | ICD-10-CM

## 2019-06-19 PROCEDURE — 99214 OFFICE O/P EST MOD 30 MIN: CPT | Performed by: PHYSICIAN ASSISTANT

## 2019-06-19 RX ORDER — SUCRALFATE 1 G/10ML
SUSPENSION ORAL
Qty: 420 ML | Refills: 0 | Status: SHIPPED | OUTPATIENT
Start: 2019-06-19 | End: 2019-06-19

## 2019-06-19 RX ORDER — SODIUM CHLORIDE 1000 MG
2 TABLET, SOLUBLE MISCELLANEOUS 2 TIMES DAILY
Qty: 30 TABLET | Refills: 0 | Status: SHIPPED | OUTPATIENT
Start: 2019-06-19 | End: 2019-10-24 | Stop reason: SDUPTHER

## 2019-06-25 DIAGNOSIS — I10 ESSENTIAL HYPERTENSION: ICD-10-CM

## 2019-06-25 RX ORDER — LISINOPRIL 10 MG/1
TABLET ORAL
Qty: 90 TABLET | Refills: 3 | Status: SHIPPED | OUTPATIENT
Start: 2019-06-25 | End: 2019-09-11

## 2019-07-14 DIAGNOSIS — G25.81 RESTLESS LEG SYNDROME: ICD-10-CM

## 2019-07-15 ENCOUNTER — APPOINTMENT (OUTPATIENT)
Dept: LAB | Facility: CLINIC | Age: 84
End: 2019-07-15
Payer: MEDICARE

## 2019-07-15 DIAGNOSIS — E87.1 HYPONATREMIA: ICD-10-CM

## 2019-07-15 LAB
ANION GAP SERPL CALCULATED.3IONS-SCNC: 10 MMOL/L (ref 4–13)
BUN SERPL-MCNC: 14 MG/DL (ref 5–25)
CALCIUM SERPL-MCNC: 8.3 MG/DL (ref 8.3–10.1)
CHLORIDE SERPL-SCNC: 101 MMOL/L (ref 100–108)
CO2 SERPL-SCNC: 23 MMOL/L (ref 21–32)
CREAT SERPL-MCNC: 0.97 MG/DL (ref 0.6–1.3)
GFR SERPL CREATININE-BSD FRML MDRD: 51 ML/MIN/1.73SQ M
GLUCOSE SERPL-MCNC: 106 MG/DL (ref 65–140)
POTASSIUM SERPL-SCNC: 4.1 MMOL/L (ref 3.5–5.3)
SODIUM SERPL-SCNC: 134 MMOL/L (ref 136–145)

## 2019-07-15 PROCEDURE — 36415 COLL VENOUS BLD VENIPUNCTURE: CPT

## 2019-07-15 PROCEDURE — 80048 BASIC METABOLIC PNL TOTAL CA: CPT

## 2019-07-15 RX ORDER — ROPINIROLE 2 MG/1
TABLET, FILM COATED ORAL
Qty: 90 TABLET | Refills: 3 | Status: SHIPPED | OUTPATIENT
Start: 2019-07-15 | End: 2020-04-29

## 2019-08-14 ENCOUNTER — TELEPHONE (OUTPATIENT)
Dept: NEPHROLOGY | Facility: CLINIC | Age: 84
End: 2019-08-14

## 2019-08-14 ENCOUNTER — DOCUMENTATION (OUTPATIENT)
Dept: NEPHROLOGY | Facility: CLINIC | Age: 84
End: 2019-08-14

## 2019-08-14 NOTE — TELEPHONE ENCOUNTER
Hello    Thank you    Agree that family should call us if we do not call them with the lab results  Sodium level is 134   Since family already lowered salt tab we can see the result of BMP next week    Can we change BMP order to my name so it comes to my inbox directly    Thank you    np

## 2019-08-14 NOTE — TELEPHONE ENCOUNTER
Daughter called about pt recent labs  She did not hear from us regarding lab results so she decreased her salt tablets to 2 tablets daily (was taking 2 tablets BID)  She is due for repeat labs next week  Daughter wants to stop the salt tablets completely  I advised her if she does not hear from us after her next labs, to call the office and not to decrease and/or stop salt tablets until the dr reviews the labs and makes his recommendations

## 2019-09-10 RX ORDER — LISINOPRIL 20 MG/1
TABLET ORAL
Refills: 0 | COMMUNITY
Start: 2019-06-27 | End: 2020-01-30 | Stop reason: HOSPADM

## 2019-09-10 RX ORDER — SUCRALFATE 1 G/10ML
SUSPENSION ORAL
Refills: 0 | COMMUNITY
Start: 2019-06-20 | End: 2020-01-30 | Stop reason: HOSPADM

## 2019-09-11 ENCOUNTER — OFFICE VISIT (OUTPATIENT)
Dept: FAMILY MEDICINE CLINIC | Facility: CLINIC | Age: 84
End: 2019-09-11
Payer: MEDICARE

## 2019-09-11 ENCOUNTER — APPOINTMENT (OUTPATIENT)
Dept: LAB | Facility: CLINIC | Age: 84
End: 2019-09-11
Payer: MEDICARE

## 2019-09-11 VITALS
HEART RATE: 72 BPM | BODY MASS INDEX: 30.37 KG/M2 | HEIGHT: 56 IN | WEIGHT: 135 LBS | OXYGEN SATURATION: 98 % | RESPIRATION RATE: 16 BRPM | SYSTOLIC BLOOD PRESSURE: 128 MMHG | DIASTOLIC BLOOD PRESSURE: 68 MMHG

## 2019-09-11 DIAGNOSIS — M19.011 ARTHRITIS OF RIGHT SHOULDER REGION: ICD-10-CM

## 2019-09-11 DIAGNOSIS — G25.81 RESTLESS LEGS SYNDROME: ICD-10-CM

## 2019-09-11 DIAGNOSIS — E87.1 HYPONATREMIA: ICD-10-CM

## 2019-09-11 DIAGNOSIS — G62.9 PERIPHERAL POLYNEUROPATHY: ICD-10-CM

## 2019-09-11 DIAGNOSIS — K59.03 CONSTIPATION DUE TO PAIN MEDICATION: Chronic | ICD-10-CM

## 2019-09-11 DIAGNOSIS — M48.062 LUMBAR STENOSIS WITH NEUROGENIC CLAUDICATION: ICD-10-CM

## 2019-09-11 DIAGNOSIS — I10 ESSENTIAL HYPERTENSION: Primary | ICD-10-CM

## 2019-09-11 DIAGNOSIS — M25.411 EFFUSION OF RIGHT SHOULDER JOINT: ICD-10-CM

## 2019-09-11 DIAGNOSIS — I10 ESSENTIAL HYPERTENSION: ICD-10-CM

## 2019-09-11 DIAGNOSIS — M25.511 ACUTE PAIN OF RIGHT SHOULDER: ICD-10-CM

## 2019-09-11 DIAGNOSIS — N17.9 AKI (ACUTE KIDNEY INJURY) (HCC): ICD-10-CM

## 2019-09-11 DIAGNOSIS — F03.90 SENILE DEMENTIA WITHOUT BEHAVIORAL DISTURBANCE (HCC): ICD-10-CM

## 2019-09-11 DIAGNOSIS — I16.0 HYPERTENSIVE URGENCY: ICD-10-CM

## 2019-09-11 PROBLEM — R07.89 CHEST DISCOMFORT: Status: RESOLVED | Noted: 2019-06-05 | Resolved: 2019-09-11

## 2019-09-11 PROBLEM — R20.0 LEFT SIDED NUMBNESS: Status: RESOLVED | Noted: 2019-05-31 | Resolved: 2019-09-11

## 2019-09-11 LAB
ALBUMIN SERPL BCP-MCNC: 3.4 G/DL (ref 3.5–5)
ALP SERPL-CCNC: 115 U/L (ref 46–116)
ALT SERPL W P-5'-P-CCNC: 16 U/L (ref 12–78)
ANION GAP SERPL CALCULATED.3IONS-SCNC: 6 MMOL/L (ref 4–13)
AST SERPL W P-5'-P-CCNC: 19 U/L (ref 5–45)
BASOPHILS # BLD AUTO: 0.05 THOUSANDS/ΜL (ref 0–0.1)
BASOPHILS NFR BLD AUTO: 1 % (ref 0–1)
BILIRUB SERPL-MCNC: 0.32 MG/DL (ref 0.2–1)
BUN SERPL-MCNC: 16 MG/DL (ref 5–25)
CALCIUM SERPL-MCNC: 8.7 MG/DL (ref 8.3–10.1)
CHLORIDE SERPL-SCNC: 102 MMOL/L (ref 100–108)
CO2 SERPL-SCNC: 25 MMOL/L (ref 21–32)
CREAT SERPL-MCNC: 1.01 MG/DL (ref 0.6–1.3)
EOSINOPHIL # BLD AUTO: 0.04 THOUSAND/ΜL (ref 0–0.61)
EOSINOPHIL NFR BLD AUTO: 1 % (ref 0–6)
ERYTHROCYTE [DISTWIDTH] IN BLOOD BY AUTOMATED COUNT: 13.9 % (ref 11.6–15.1)
GFR SERPL CREATININE-BSD FRML MDRD: 48 ML/MIN/1.73SQ M
GLUCOSE SERPL-MCNC: 90 MG/DL (ref 65–140)
HCT VFR BLD AUTO: 39.4 % (ref 34.8–46.1)
HGB BLD-MCNC: 12.7 G/DL (ref 11.5–15.4)
IMM GRANULOCYTES # BLD AUTO: 0.01 THOUSAND/UL (ref 0–0.2)
IMM GRANULOCYTES NFR BLD AUTO: 0 % (ref 0–2)
LYMPHOCYTES # BLD AUTO: 2.82 THOUSANDS/ΜL (ref 0.6–4.47)
LYMPHOCYTES NFR BLD AUTO: 45 % (ref 14–44)
MCH RBC QN AUTO: 31.1 PG (ref 26.8–34.3)
MCHC RBC AUTO-ENTMCNC: 32.2 G/DL (ref 31.4–37.4)
MCV RBC AUTO: 96 FL (ref 82–98)
MONOCYTES # BLD AUTO: 0.5 THOUSAND/ΜL (ref 0.17–1.22)
MONOCYTES NFR BLD AUTO: 8 % (ref 4–12)
NEUTROPHILS # BLD AUTO: 2.77 THOUSANDS/ΜL (ref 1.85–7.62)
NEUTS SEG NFR BLD AUTO: 45 % (ref 43–75)
NRBC BLD AUTO-RTO: 0 /100 WBCS
PLATELET # BLD AUTO: 279 THOUSANDS/UL (ref 149–390)
PMV BLD AUTO: 9.3 FL (ref 8.9–12.7)
POTASSIUM SERPL-SCNC: 4.3 MMOL/L (ref 3.5–5.3)
PROT SERPL-MCNC: 7.8 G/DL (ref 6.4–8.2)
RBC # BLD AUTO: 4.09 MILLION/UL (ref 3.81–5.12)
SODIUM SERPL-SCNC: 133 MMOL/L (ref 136–145)
TSH SERPL DL<=0.05 MIU/L-ACNC: 2.02 UIU/ML (ref 0.36–3.74)
WBC # BLD AUTO: 6.19 THOUSAND/UL (ref 4.31–10.16)

## 2019-09-11 PROCEDURE — 84443 ASSAY THYROID STIM HORMONE: CPT

## 2019-09-11 PROCEDURE — 1124F ACP DISCUSS-NO DSCNMKR DOCD: CPT | Performed by: FAMILY MEDICINE

## 2019-09-11 PROCEDURE — 20610 DRAIN/INJ JOINT/BURSA W/O US: CPT | Performed by: FAMILY MEDICINE

## 2019-09-11 PROCEDURE — 99215 OFFICE O/P EST HI 40 MIN: CPT | Performed by: FAMILY MEDICINE

## 2019-09-11 PROCEDURE — 36415 COLL VENOUS BLD VENIPUNCTURE: CPT

## 2019-09-11 PROCEDURE — 80053 COMPREHEN METABOLIC PANEL: CPT

## 2019-09-11 PROCEDURE — 85025 COMPLETE CBC W/AUTO DIFF WBC: CPT

## 2019-09-11 RX ORDER — LISINOPRIL 40 MG/1
40 TABLET ORAL DAILY
Qty: 90 TABLET | Refills: 3 | Status: SHIPPED | OUTPATIENT
Start: 2019-09-11 | End: 2020-09-07

## 2019-09-11 RX ORDER — HYDROCODONE BITARTRATE AND ACETAMINOPHEN 5; 325 MG/1; MG/1
1 TABLET ORAL EVERY 6 HOURS PRN
Qty: 90 TABLET | Refills: 0 | Status: SHIPPED | OUTPATIENT
Start: 2019-09-11 | End: 2020-01-06 | Stop reason: SDUPTHER

## 2019-09-11 RX ORDER — LIDOCAINE HYDROCHLORIDE 10 MG/ML
1 INJECTION, SOLUTION INFILTRATION; PERINEURAL
Status: COMPLETED | OUTPATIENT
Start: 2019-09-11 | End: 2019-09-11

## 2019-09-11 RX ORDER — METHYLPREDNISOLONE ACETATE 40 MG/ML
1 INJECTION, SUSPENSION INTRA-ARTICULAR; INTRALESIONAL; INTRAMUSCULAR; SOFT TISSUE
Status: COMPLETED | OUTPATIENT
Start: 2019-09-11 | End: 2019-09-11

## 2019-09-11 RX ORDER — METHYLPREDNISOLONE ACETATE 40 MG/ML
40 INJECTION, SUSPENSION INTRA-ARTICULAR; INTRALESIONAL; INTRAMUSCULAR; SOFT TISSUE ONCE
Status: COMPLETED | OUTPATIENT
Start: 2019-09-11 | End: 2019-09-11

## 2019-09-11 RX ADMIN — METHYLPREDNISOLONE ACETATE 1 ML: 40 INJECTION, SUSPENSION INTRA-ARTICULAR; INTRALESIONAL; INTRAMUSCULAR; SOFT TISSUE at 10:10

## 2019-09-11 RX ADMIN — LIDOCAINE HYDROCHLORIDE 1 ML: 10 INJECTION, SOLUTION INFILTRATION; PERINEURAL at 10:10

## 2019-09-11 RX ADMIN — METHYLPREDNISOLONE ACETATE 40 MG: 40 INJECTION, SUSPENSION INTRA-ARTICULAR; INTRALESIONAL; INTRAMUSCULAR; SOFT TISSUE at 10:12

## 2019-09-11 NOTE — ASSESSMENT & PLAN NOTE
30 cc of serous fluid drained from the right shoulder  Intra-articular corticosteroid injection provided  Patient does have some improvement  This is far less fluid that has been drained off as opposed to the past   Patient does have severe arthritis in her right shoulder

## 2019-09-11 NOTE — ASSESSMENT & PLAN NOTE
Ambulates with the assistance of a walker  Patient remains on p r n  Vicodin  Refill provided  Prior to prescribing the controlled substance, a patient search was performed on the [place] prescription drug monitoring program web site  There was no evidence of diversion or misuse    Prescription provided

## 2019-09-11 NOTE — ASSESSMENT & PLAN NOTE
Hypertension is very well controlled at this time  Continue on hydralazine and lisinopril  Refill provided of lisinopril

## 2019-09-11 NOTE — ASSESSMENT & PLAN NOTE
Patient has been taking linzess which seems to be more effective than Senokot and MiraLax  Refill provided

## 2019-09-11 NOTE — ASSESSMENT & PLAN NOTE
Not presently on any diuretics  Daughter has been gradually been tapering her dosage of sodium chloride tablets  Check serum sodium level  I would like to discontinue her sodium chloride tablets altogether    Will call daughter with results

## 2019-09-11 NOTE — PROGRESS NOTES
Subjective:      Patient ID: Jered Moeller is a 80 y o  female  79-year-old who is actually going to be turning 89 because her birthday is wrong courtesy of the [employer] presents with her daughter for follow-up in regards to chronic conditions  Patient still has difficulty with ambulation due to spinal stenosis, arthritis and swelling of her right shoulder  Patient does continue to have constipation on a chronic basis  She is taking MiraLax twice a day as well as stool softener with little improvement  She seemed to do much better when she was on linzess  Daughter has been gradually reducing her sodium chloride tablets  It was felt that she had hyponatremia due to SIADH and thiazide type diuretics to control her hypertension  She is no longer on thiazide type diuretics  She was taking 2 tablets of sodium chloride and her last serum sodium done July was 134  Patient and her daughter do not want to return to Nephrology  She is due for repeat labs  Aside from well that she feels pretty good for 89  Past Medical History:   Diagnosis Date    Chest pain     last assessed-4/15/2013    Chronic pain     back    Gait disturbance     last assessed-2/12/2014    Hypertension     Hyponatremia     Hypoxemia     last assessed-1/5/2015    Melena     last assessed-2/23/2017    Rotator cuff injury     Wheezing     last assessed-1/5/2015       Family History   Problem Relation Age of Onset    Heart attack Mother        Past Surgical History:   Procedure Laterality Date    APPENDECTOMY      BACK SURGERY      EYE SURGERY          reports that she has never smoked  She has never used smokeless tobacco  She reports that she does not drink alcohol or use drugs        Current Outpatient Medications:     acetaminophen (TYLENOL) 325 mg tablet, Take 2 tablets (650 mg total) by mouth every 6 (six) hours as needed for mild pain, headaches or fever, Disp: 30 tablet, Rfl: 0    ALPRAZolam (XANAX) 0 25 mg tablet, Take 1 tablet (0 25 mg total) by mouth 2 (two) times a day as needed for anxiety or sleep, Disp: 60 tablet, Rfl: 5    amiodarone 200 mg tablet, Take 0 5 tablets (100 mg total) by mouth daily, Disp: 90 tablet, Rfl: 3    amLODIPine (NORVASC) 10 mg tablet, Take 1 tablet (10 mg total) by mouth daily, Disp: 90 tablet, Rfl: 3    aspirin 81 MG tablet, Take 81 mg by mouth 2 (two) times a day, Disp: , Rfl:     CARAFATE 1 GM/10ML suspension, , Disp: , Rfl: 0    dorzolamide-timolol (COSOPT) 22 3-6 8 MG/ML ophthalmic solution, Administer 1 drop into the left eye 2 (two) times a day, Disp: , Rfl:     famotidine (PEPCID) 20 mg tablet, Take 1 tablet (20 mg total) by mouth 2 (two) times a day, Disp: 180 tablet, Rfl: 3    gabapentin (NEURONTIN) 100 mg capsule, Take 1 capsule (100 mg total) by mouth 3 (three) times a day, Disp: 270 capsule, Rfl: 3    hydrALAZINE (APRESOLINE) 10 mg tablet, Take 1 tablet (10 mg total) by mouth 2 (two) times a day as needed (For SBP>160 or DBP>90 consistently, start taking this medication ), Disp: 180 tablet, Rfl: 0    HYDROcodone-acetaminophen (NORCO) 5-325 mg per tablet, Take 1 tablet by mouth every 6 (six) hours as needed for painMax Daily Amount: 4 tablets, Disp: 90 tablet, Rfl: 0    lisinopril (ZESTRIL) 40 mg tablet, Take 1 tablet (40 mg total) by mouth daily, Disp: 90 tablet, Rfl: 3    meclizine (ANTIVERT) 25 mg tablet, Take 1 tablet (25 mg total) by mouth every 8 (eight) hours as needed for dizziness, Disp: 30 tablet, Rfl: 0    Multiple Vitamins-Minerals (ICAPS AREDS 2 PO), Take 1 tablet by mouth 2 (two) times a day, Disp: , Rfl:     Omega-3 Fatty Acids (FISH OIL) 1,000 mg, Take 1,000 mg by mouth 2 (two) times a day, Disp: , Rfl:     rOPINIRole (REQUIP) 2 mg tablet, take 1 tablet by mouth at bedtime, Disp: 90 tablet, Rfl: 3    sodium chloride 1 g tablet, Take 2 tablets (2 g total) by mouth 2 (two) times a day (Patient taking differently: Take 1 g by mouth 2 (two) times a day ), Disp: 30 tablet, Rfl: 0    traZODone (DESYREL) 50 mg tablet, take 1 tablet by mouth at bedtime, Disp: 90 tablet, Rfl: 3    linaCLOtide 145 MCG CAPS, Take 1 capsule (145 mcg total) by mouth daily, Disp: 90 capsule, Rfl: 3    lisinopril (ZESTRIL) 20 mg tablet, , Disp: , Rfl: 0    pantoprazole (PROTONIX) 40 mg tablet, Take 1 tablet (40 mg total) by mouth 2 (two) times a day before meals for 30 days, Disp: 60 tablet, Rfl: 0    senna-docusate sodium (SENOKOT S) 8 6-50 mg per tablet, Take 2 tablets by mouth 2 (two) times a day for 30 days, Disp: 120 tablet, Rfl: 3    The following portions of the patient's history were reviewed and updated as appropriate: allergies, current medications, past family history, past medical history, past social history, past surgical history and problem list     Review of Systems   Constitutional: Negative  HENT: Negative  Eyes: Negative  Respiratory: Negative  Cardiovascular: Negative  Gastrointestinal: Positive for constipation  Endocrine: Negative  Genitourinary: Negative  Musculoskeletal: Positive for arthralgias (Right shoulder), gait problem (Ambulates with the assistance of a walker) and joint swelling ( right shoulder)  Skin: Negative  Allergic/Immunologic: Negative  Hematological: Negative  Psychiatric/Behavioral: Negative  Objective:    /68   Pulse 72   Resp 16   Ht 4' 8" (1 422 m)   Wt 61 2 kg (135 lb)   SpO2 98%   BMI 30 27 kg/m²      Physical Exam   Constitutional: She is oriented to person, place, and time  She appears well-developed and well-nourished  Overweight  She is 80years old  HENT:   Head: Normocephalic and atraumatic  Eyes: Pupils are equal, round, and reactive to light  Conjunctivae are normal    Neck: Normal range of motion  Neck supple  Cardiovascular: Normal rate, regular rhythm and normal heart sounds  No murmur heard    Pulmonary/Chest: Effort normal and breath sounds normal  Abdominal: Soft  Bowel sounds are normal    Musculoskeletal: She exhibits tenderness (Arthritic changes in severe crepitation right shoulder) and deformity  Severe crepitation of the right shoulder with moderate-sized joint effusion   Neurological: She is alert and oriented to person, place, and time  She has normal reflexes  Psychiatric: She has a normal mood and affect  Her behavior is normal  Judgment and thought content normal    Nursing note and vitals reviewed  No results found for this or any previous visit (from the past 1008 hour(s))  Assessment/Plan:      Large joint arthrocentesis: R glenohumeral  Date/Time: 9/11/2019 10:10 AM  Consent given by: patient and power of   Supporting Documentation  Indications: pain and joint swelling   Procedure Details  Location: shoulder - R glenohumeral  Preparation: Patient was prepped and draped in the usual sterile fashion  Needle size: 22 G  Ultrasound guidance: no  Approach: posterior  Medications administered: 1 mL lidocaine 1 %; 1 mL methylPREDNISolone acetate 40 mg/mL    Aspirate amount: 30 mL  Aspirate: serous and blood-tinged    Patient tolerance: patient tolerated the procedure well with no immediate complications  Dressing:  Sterile dressing applied        Constipation due to pain medication  Patient has been taking linzess which seems to be more effective than Senokot and MiraLax  Refill provided  Essential hypertension  Hypertension is very well controlled at this time  Continue on hydralazine and lisinopril  Refill provided of lisinopril  Senile dementia without behavioral disturbance  Not too bad for 80years of age  Continue on fish oil supplementation  Effusion of right shoulder joint  30 cc of serous fluid drained from the right shoulder  Intra-articular corticosteroid injection provided  Patient does have some improvement    This is far less fluid that has been drained off as opposed to the past   Patient does have severe arthritis in her right shoulder  Arthritis of right shoulder region  Drainage and intra-articular corticosteroid injection performed as per procedure note    BOOKER (acute kidney injury) ([facility] [address] )  Check renal function    Restless legs syndrome  Well controlled on Requip  Patient is on gabapentin for neuropathy  Peripheral neuropathy  Patient remains on gabapentin 100 mg 3 times daily    Lumbar stenosis with neurogenic claudication  Ambulates with the assistance of a walker  Patient remains on p r n  Vicodin  Refill provided  Prior to prescribing the controlled substance, a patient search was performed on the [place] prescription drug monitoring program web site  There was no evidence of diversion or misuse  Prescription provided      Hyponatremia  Not presently on any diuretics  Daughter has been gradually been tapering her dosage of sodium chloride tablets  Check serum sodium level  I would like to discontinue her sodium chloride tablets altogether  Will call daughter with results          Problem List Items Addressed This Visit        Digestive    Constipation due to pain medication (Chronic)     Patient has been taking linzess which seems to be more effective than Senokot and MiraLax  Refill provided  Relevant Medications    linaCLOtide 145 MCG CAPS       Cardiovascular and Mediastinum    Essential hypertension - Primary     Hypertension is very well controlled at this time  Continue on hydralazine and lisinopril  Refill provided of lisinopril           Relevant Medications    lisinopril (ZESTRIL) 20 mg tablet    lisinopril (ZESTRIL) 40 mg tablet    Other Relevant Orders    CBC and differential    TSH, 3rd generation with Free T4 reflex    RESOLVED: Hypertensive urgency    Relevant Medications    lisinopril (ZESTRIL) 20 mg tablet    lisinopril (ZESTRIL) 40 mg tablet       Nervous and Auditory    Peripheral neuropathy     Patient remains on gabapentin 100 mg 3 times daily Senile dementia without behavioral disturbance     Not too bad for 80years of age  Continue on fish oil supplementation  Musculoskeletal and Integument    Arthritis of right shoulder region     Drainage and intra-articular corticosteroid injection performed as per procedure note         Effusion of right shoulder joint     30 cc of serous fluid drained from the right shoulder  Intra-articular corticosteroid injection provided  Patient does have some improvement  This is far less fluid that has been drained off as opposed to the past   Patient does have severe arthritis in her right shoulder  Genitourinary    BOOKER (acute kidney injury) ([facility] [address] )     Check renal function            Other    Acute pain of right shoulder    Relevant Medications    HYDROcodone-acetaminophen (NORCO) 5-325 mg per tablet    Hyponatremia     Not presently on any diuretics  Daughter has been gradually been tapering her dosage of sodium chloride tablets  Check serum sodium level  I would like to discontinue her sodium chloride tablets altogether  Will call daughter with results         Relevant Orders    Comprehensive metabolic panel    TSH, 3rd generation with Free T4 reflex    Lumbar stenosis with neurogenic claudication     Ambulates with the assistance of a walker  Patient remains on p r n  Vicodin  Refill provided  Prior to prescribing the controlled substance, a patient search was performed on the [place] prescription drug monitoring program web site  There was no evidence of diversion or misuse  Prescription provided           Restless legs syndrome     Well controlled on Requip  Patient is on gabapentin for neuropathy  BMI Counseling: Body mass index is 30 27 kg/m²  The BMI is above normal  Exercise recommendations include joining a gym       I have spent 44 minutes with Patient and family today in which greater than 50% of this time was spent in counseling/coordination of care regarding Diagnostic results, Prognosis, Risks and benefits of tx options, Intructions for management, Patient and family education, Importance of tx compliance, Risk factor reductions and Impressions

## 2019-09-12 ENCOUNTER — DOCUMENTATION (OUTPATIENT)
Dept: NEPHROLOGY | Facility: CLINIC | Age: 84
End: 2019-09-12

## 2019-09-12 ENCOUNTER — TELEPHONE (OUTPATIENT)
Dept: NEPHROLOGY | Facility: CLINIC | Age: 84
End: 2019-09-12

## 2019-09-12 DIAGNOSIS — E87.1 HYPONATREMIA: Primary | ICD-10-CM

## 2019-09-12 NOTE — TELEPHONE ENCOUNTER
----- Message from Lily Hernandez MD sent at 9/12/2019 10:24 AM EDT -----  Hello    Can the patient's daughter be notified that the sodium level is slightly lower  Can the patient's daughter increase the sodium tablets to continue 2 tablets in the morning, and add 1 more tablet in the evening    -BMP in 2-3 weeks (can be prior to next appt)    Thank you    np

## 2019-10-24 ENCOUNTER — DOCUMENTATION (OUTPATIENT)
Dept: NEPHROLOGY | Facility: CLINIC | Age: 84
End: 2019-10-24

## 2019-10-24 DIAGNOSIS — I16.0 HYPERTENSIVE URGENCY: ICD-10-CM

## 2019-10-24 RX ORDER — SODIUM CHLORIDE 1000 MG
2 TABLET, SOLUBLE MISCELLANEOUS DAILY
Qty: 60 TABLET | Refills: 0 | Status: SHIPPED | OUTPATIENT
Start: 2019-10-24 | End: 2020-01-30 | Stop reason: HOSPADM

## 2019-10-24 NOTE — PROGRESS NOTES
Called pt to ask if any labs for tomorrows appt, daughter in law states that she did not want to go, also 9/12/19 we had asked pt to increase sodium tabs to 2 g in am and 1 g in pm, daughter in law states that she is been giving her 1 g only because she didn't have enough pills and didn't know that she could have called the office for a new prescription  Pt doesn't want to keep the appt for tomorrow she will do labs first hopefully tomorrow or Saturday  Dr Palacios aware of the above, he would like pt to continue on 2 g of sodium daily until we have results and we will adjust after if needed  Daughter in law agreeable

## 2019-11-25 DIAGNOSIS — G47.00 INSOMNIA, UNSPECIFIED TYPE: ICD-10-CM

## 2019-11-25 RX ORDER — TRAZODONE HYDROCHLORIDE 50 MG/1
TABLET ORAL
Qty: 90 TABLET | Refills: 3 | Status: SHIPPED | OUTPATIENT
Start: 2019-11-25 | End: 2020-11-27

## 2019-12-21 DIAGNOSIS — I10 ESSENTIAL HYPERTENSION: ICD-10-CM

## 2019-12-26 RX ORDER — LISINOPRIL 20 MG/1
TABLET ORAL
Qty: 90 TABLET | Refills: 0 | Status: SHIPPED | OUTPATIENT
Start: 2019-12-26 | End: 2020-01-30 | Stop reason: HOSPADM

## 2020-01-06 ENCOUNTER — OFFICE VISIT (OUTPATIENT)
Dept: FAMILY MEDICINE CLINIC | Facility: CLINIC | Age: 85
End: 2020-01-06
Payer: MEDICARE

## 2020-01-06 VITALS
DIASTOLIC BLOOD PRESSURE: 74 MMHG | TEMPERATURE: 97.8 F | WEIGHT: 135 LBS | OXYGEN SATURATION: 98 % | HEART RATE: 86 BPM | HEIGHT: 56 IN | SYSTOLIC BLOOD PRESSURE: 138 MMHG | BODY MASS INDEX: 30.37 KG/M2 | RESPIRATION RATE: 14 BRPM

## 2020-01-06 DIAGNOSIS — F51.01 PRIMARY INSOMNIA: ICD-10-CM

## 2020-01-06 DIAGNOSIS — K59.03 CONSTIPATION DUE TO PAIN MEDICATION: Chronic | ICD-10-CM

## 2020-01-06 DIAGNOSIS — F11.20 CONTINUOUS OPIOID DEPENDENCE (HCC): Chronic | ICD-10-CM

## 2020-01-06 DIAGNOSIS — M25.511 ACUTE PAIN OF RIGHT SHOULDER: ICD-10-CM

## 2020-01-06 DIAGNOSIS — G89.4 CHRONIC PAIN SYNDROME: ICD-10-CM

## 2020-01-06 DIAGNOSIS — G62.9 PERIPHERAL NEUROPATHY: ICD-10-CM

## 2020-01-06 DIAGNOSIS — G62.9 PERIPHERAL POLYNEUROPATHY: Primary | ICD-10-CM

## 2020-01-06 DIAGNOSIS — E87.1 HYPONATREMIA: ICD-10-CM

## 2020-01-06 PROBLEM — Z91.89 AT RISK FOR DEHYDRATION DUE TO POOR FLUID INTAKE: Status: RESOLVED | Noted: 2019-06-02 | Resolved: 2020-01-06

## 2020-01-06 PROCEDURE — 99214 OFFICE O/P EST MOD 30 MIN: CPT | Performed by: FAMILY MEDICINE

## 2020-01-06 RX ORDER — HYDROCODONE BITARTRATE AND ACETAMINOPHEN 5; 325 MG/1; MG/1
1 TABLET ORAL EVERY 6 HOURS PRN
Qty: 90 TABLET | Refills: 0 | Status: SHIPPED | OUTPATIENT
Start: 2020-01-06 | End: 2020-02-12

## 2020-01-06 RX ORDER — ALPRAZOLAM 0.25 MG/1
0.25 TABLET ORAL 2 TIMES DAILY PRN
Qty: 60 TABLET | Refills: 5 | Status: SHIPPED | OUTPATIENT
Start: 2020-01-06 | End: 2020-07-22

## 2020-01-06 RX ORDER — GABAPENTIN 100 MG/1
200 CAPSULE ORAL 3 TIMES DAILY
Qty: 540 CAPSULE | Refills: 3 | Status: SHIPPED | OUTPATIENT
Start: 2020-01-06 | End: 2020-01-30 | Stop reason: HOSPADM

## 2020-01-06 RX ORDER — CYCLOBENZAPRINE HCL 5 MG
5 TABLET ORAL 3 TIMES DAILY PRN
Qty: 90 TABLET | Refills: 0 | Status: SHIPPED | OUTPATIENT
Start: 2020-01-06 | End: 2020-01-30

## 2020-01-06 NOTE — ASSESSMENT & PLAN NOTE
Patient does ambulate with the assistance of a walker  Patient does have p r n  Vicodin of which a refill was provided  Patient did have imaging of her lumbar spine in the past   She is not a good surgical candidate given her advanced age  Will try to increase her dose of gabapentin to help better control her pain

## 2020-01-06 NOTE — ASSESSMENT & PLAN NOTE
They did taper her dose of sodium chloride  She is no longer taking sodium chloride tablets  Order provided for repeat serum sodium level with BMP  Will contact daughter with results

## 2020-01-06 NOTE — ASSESSMENT & PLAN NOTE
- patient remains on Vicodin 5/325 4 times daily as needed for arthritic pains  This does seem to provide some relief of her pain  Refill provided  PDMP was not checked  She is 80years of age    Doubt that she is selling it on the street

## 2020-01-06 NOTE — PROGRESS NOTES
Subjective:      Patient ID: Rafael Curran is a 80 y o  female  Patient presents with her daughter once again for evaluation of paresthesia and cramping in bilateral lower extremities  Patient is almost 80years of age  Daughter does perform translation for her  She continues to complain of numbness and burning type pain which is occurring in both lower extremities  She does complain muscular cramping as well  Back in September we did try to increase her dose of gabapentin up to 100 mg t i d   Patient does have known history of spinal stenosis, hyponatremia  She has not been taking her sodium chloride tablets  She has not had any confusion or lethargy  She is due for repeat electrolyte panel  Daughter states that she is complaining more frequently of a burning-type pain in both legs  This can be associated with significant cramping in the leg musculature which is quite painful  Patient does ambulate with the assistance of a rolling walker  Denies saddle anesthesia  She does have some urinary incontinence no more frequent than usual   She is taking it Vicodin on a p r n  Basis for lower back pain which does provide some relief  She does request refills of her Vicodin and p r n  Alprazolam       Past Medical History:   Diagnosis Date    Chest pain     last assessed-4/15/2013    Chronic pain     back    Gait disturbance     last assessed-2/12/2014    Hypertension     Hyponatremia     Hypoxemia     last assessed-1/5/2015    Melena     last assessed-2/23/2017    Rotator cuff injury     Wheezing     last assessed-1/5/2015       Family History   Problem Relation Age of Onset    Heart attack Mother        Past Surgical History:   Procedure Laterality Date    APPENDECTOMY      BACK SURGERY      EYE SURGERY          reports that she has never smoked  She has never used smokeless tobacco  She reports that she does not drink alcohol or use drugs        Current Outpatient Medications:    acetaminophen (TYLENOL) 325 mg tablet, Take 2 tablets (650 mg total) by mouth every 6 (six) hours as needed for mild pain, headaches or fever, Disp: 30 tablet, Rfl: 0    ALPRAZolam (XANAX) 0 25 mg tablet, Take 1 tablet (0 25 mg total) by mouth 2 (two) times a day as needed for anxiety or sleep, Disp: 60 tablet, Rfl: 5    amiodarone 200 mg tablet, Take 0 5 tablets (100 mg total) by mouth daily, Disp: 90 tablet, Rfl: 3    amLODIPine (NORVASC) 10 mg tablet, Take 1 tablet (10 mg total) by mouth daily, Disp: 90 tablet, Rfl: 3    aspirin 81 MG tablet, Take 81 mg by mouth 2 (two) times a day, Disp: , Rfl:     CARAFATE 1 GM/10ML suspension, , Disp: , Rfl: 0    dorzolamide-timolol (COSOPT) 22 3-6 8 MG/ML ophthalmic solution, Administer 1 drop into the left eye 2 (two) times a day, Disp: , Rfl:     famotidine (PEPCID) 20 mg tablet, Take 1 tablet (20 mg total) by mouth 2 (two) times a day, Disp: 180 tablet, Rfl: 3    gabapentin (NEURONTIN) 100 mg capsule, Take 2 capsules (200 mg total) by mouth 3 (three) times a day, Disp: 540 capsule, Rfl: 3    hydrALAZINE (APRESOLINE) 10 mg tablet, Take 1 tablet (10 mg total) by mouth 2 (two) times a day as needed (For SBP>160 or DBP>90 consistently, start taking this medication ), Disp: 180 tablet, Rfl: 0    HYDROcodone-acetaminophen (NORCO) 5-325 mg per tablet, Take 1 tablet by mouth every 6 (six) hours as needed for painMax Daily Amount: 4 tablets, Disp: 90 tablet, Rfl: 0    linaCLOtide 145 MCG CAPS, Take 1 capsule (145 mcg total) by mouth daily, Disp: 90 capsule, Rfl: 3    lisinopril (ZESTRIL) 20 mg tablet, , Disp: , Rfl: 0    lisinopril (ZESTRIL) 20 mg tablet, take 1 tablet by mouth twice a day, Disp: 90 tablet, Rfl: 0    lisinopril (ZESTRIL) 40 mg tablet, Take 1 tablet (40 mg total) by mouth daily, Disp: 90 tablet, Rfl: 3    meclizine (ANTIVERT) 25 mg tablet, Take 1 tablet (25 mg total) by mouth every 8 (eight) hours as needed for dizziness, Disp: 30 tablet, Rfl: 0   Multiple Vitamins-Minerals (ICAPS AREDS 2 PO), Take 1 tablet by mouth 2 (two) times a day, Disp: , Rfl:     Omega-3 Fatty Acids (FISH OIL) 1,000 mg, Take 1,000 mg by mouth 2 (two) times a day, Disp: , Rfl:     rOPINIRole (REQUIP) 2 mg tablet, take 1 tablet by mouth at bedtime, Disp: 90 tablet, Rfl: 3    sodium chloride 1 g tablet, Take 2 tablets (2 g total) by mouth daily, Disp: 60 tablet, Rfl: 0    traZODone (DESYREL) 50 mg tablet, take 1 tablet by mouth at bedtime, Disp: 90 tablet, Rfl: 3    cyclobenzaprine (FLEXERIL) 5 mg tablet, Take 1 tablet (5 mg total) by mouth 3 (three) times a day as needed for muscle spasms, Disp: 90 tablet, Rfl: 0    pantoprazole (PROTONIX) 40 mg tablet, Take 1 tablet (40 mg total) by mouth 2 (two) times a day before meals for 30 days, Disp: 60 tablet, Rfl: 0    senna-docusate sodium (SENOKOT S) 8 6-50 mg per tablet, Take 2 tablets by mouth 2 (two) times a day for 30 days, Disp: 120 tablet, Rfl: 3    The following portions of the patient's history were reviewed and updated as appropriate: allergies, current medications, past family history, past medical history, past social history, past surgical history and problem list     Review of Systems   Constitutional: Negative  HENT: Negative  Eyes: Negative  Negative for itching  Respiratory: Negative  Cardiovascular: Negative  Gastrointestinal: Negative  No fecal incontinence   Endocrine: Negative  Genitourinary: Negative  Patient has had urinary incontinence   Musculoskeletal: Positive for arthralgias, back pain and gait problem (Ambulates with the assistance of a walker)  Skin: Negative  Allergic/Immunologic: Negative  Neurological: Positive for numbness ( transiently in bilateral lower extremities)  Negative for dizziness, tremors and weakness  Hematological: Negative  Psychiatric/Behavioral: Negative              Objective:    /74 (BP Location: Left arm, Patient Position: Sitting, Cuff Size: Adult)   Pulse 86   Temp 97 8 °F (36 6 °C)   Resp 14   Ht 4' 8" (1 422 m)   Wt 61 2 kg (135 lb)   SpO2 98%   BMI 30 27 kg/m²      Physical Exam   Constitutional: She is oriented to person, place, and time  She appears well-developed and well-nourished  Overweight, 80years of age   HENT:   Head: Normocephalic and atraumatic  Eyes: Pupils are equal, round, and reactive to light  Conjunctivae are normal    Neck: Normal range of motion  Neck supple  Cardiovascular: Normal rate and regular rhythm  Pulmonary/Chest: Effort normal and breath sounds normal    Abdominal: Soft  Bowel sounds are normal    Neurological: She is alert and oriented to person, place, and time  She has normal reflexes  Psychiatric: She has a normal mood and affect  Her behavior is normal  Judgment and thought content normal    Nursing note and vitals reviewed  No results found for this or any previous visit (from the past 1008 hour(s))  Assessment/Plan:    Constipation due to pain medication  Patient remains on Linzess for narcotic induced constipation    Peripheral neuropathy  Patient's pain is neuropathic in nature  Likely due to to her spinal stenosis  No evidence of cauda equina syndrome  No fecal incontinence  No saddle anesthesia  Will increase the patient's dosage of gabapentin to 200 mg 3 times daily  Daughter will notify the office if there is improvement in her level of pain  Continuous opioid dependence ([facility] [address] )  - patient remains on Vicodin 5/325 4 times daily as needed for arthritic pains  This does seem to provide some relief of her pain  Refill provided  PDMP was not checked  She is 80years of age  Doubt that she is selling it on the street    Primary insomnia  Patient continues on trazodone 50 mg at bedtime    Lumbar stenosis with neurogenic claudication  Patient does ambulate with the assistance of a walker  Patient does have p r n  Vicodin of which a refill was provided    Patient did have imaging of her lumbar spine in the past   She is not a good surgical candidate given her advanced age  Will try to increase her dose of gabapentin to help better control her pain  Hyponatremia  They did taper her dose of sodium chloride  She is no longer taking sodium chloride tablets  Order provided for repeat serum sodium level with BMP  Will contact daughter with results  Problem List Items Addressed This Visit        Digestive    Constipation due to pain medication (Chronic)     Patient remains on Linzess for narcotic induced constipation            Nervous and Auditory    Peripheral neuropathy - Primary     Patient's pain is neuropathic in nature  Likely due to to her spinal stenosis  No evidence of cauda equina syndrome  No fecal incontinence  No saddle anesthesia  Will increase the patient's dosage of gabapentin to 200 mg 3 times daily  Daughter will notify the office if there is improvement in her level of pain  Relevant Medications    gabapentin (NEURONTIN) 100 mg capsule    cyclobenzaprine (FLEXERIL) 5 mg tablet    Other Relevant Orders    CBC and differential    Comprehensive metabolic panel    Magnesium       Other    Acute pain of right shoulder    Relevant Medications    HYDROcodone-acetaminophen (NORCO) 5-325 mg per tablet    Chronic pain    Relevant Medications    cyclobenzaprine (FLEXERIL) 5 mg tablet    Other Relevant Orders    CBC and differential    Comprehensive metabolic panel    Magnesium    Continuous opioid dependence (HCC) (Chronic)     - patient remains on Vicodin 5/325 4 times daily as needed for arthritic pains  This does seem to provide some relief of her pain  Refill provided  PDMP was not checked  She is 80years of age  Doubt that she is selling it on the street         Hyponatremia     They did taper her dose of sodium chloride  She is no longer taking sodium chloride tablets  Order provided for repeat serum sodium level with BMP    Will contact daughter with results           Primary insomnia     Patient continues on trazodone 50 mg at bedtime         Relevant Medications    ALPRAZolam (XANAX) 0 25 mg tablet

## 2020-01-06 NOTE — ASSESSMENT & PLAN NOTE
Patient's pain is neuropathic in nature  Likely due to to her spinal stenosis  No evidence of cauda equina syndrome  No fecal incontinence  No saddle anesthesia  Will increase the patient's dosage of gabapentin to 200 mg 3 times daily  Daughter will notify the office if there is improvement in her level of pain

## 2020-01-26 ENCOUNTER — APPOINTMENT (EMERGENCY)
Dept: RADIOLOGY | Facility: HOSPITAL | Age: 85
DRG: 152 | End: 2020-01-26
Payer: MEDICARE

## 2020-01-26 ENCOUNTER — APPOINTMENT (EMERGENCY)
Dept: CT IMAGING | Facility: HOSPITAL | Age: 85
DRG: 152 | End: 2020-01-26
Payer: MEDICARE

## 2020-01-26 ENCOUNTER — HOSPITAL ENCOUNTER (INPATIENT)
Facility: HOSPITAL | Age: 85
LOS: 4 days | Discharge: HOME | DRG: 152 | End: 2020-01-30
Attending: EMERGENCY MEDICINE | Admitting: HOSPITALIST
Payer: MEDICARE

## 2020-01-26 DIAGNOSIS — R11.2 NAUSEA AND VOMITING: ICD-10-CM

## 2020-01-26 DIAGNOSIS — G93.40 ACUTE ENCEPHALOPATHY: Primary | ICD-10-CM

## 2020-01-26 DIAGNOSIS — I10 ESSENTIAL HYPERTENSION: ICD-10-CM

## 2020-01-26 DIAGNOSIS — J20.9 ACUTE BRONCHITIS: ICD-10-CM

## 2020-01-26 PROBLEM — J18.9 PNEUMONIA DUE TO INFECTIOUS ORGANISM: Status: ACTIVE | Noted: 2020-01-26

## 2020-01-26 PROBLEM — G93.41 ACUTE METABOLIC ENCEPHALOPATHY: Status: ACTIVE | Noted: 2020-01-26

## 2020-01-26 LAB
ALBUMIN SERPL BCP-MCNC: 3.3 G/DL (ref 3.5–5)
ALP SERPL-CCNC: 96 U/L (ref 46–116)
ALT SERPL W P-5'-P-CCNC: 14 U/L (ref 12–78)
ANION GAP SERPL CALCULATED.3IONS-SCNC: 11 MMOL/L (ref 4–13)
AST SERPL W P-5'-P-CCNC: 22 U/L (ref 5–45)
BACTERIA UR QL AUTO: ABNORMAL /HPF
BASOPHILS # BLD AUTO: 0.03 THOUSANDS/ΜL (ref 0–0.1)
BASOPHILS NFR BLD AUTO: 1 % (ref 0–1)
BILIRUB SERPL-MCNC: 0.27 MG/DL (ref 0.2–1)
BILIRUB UR QL STRIP: NEGATIVE
BUN SERPL-MCNC: 15 MG/DL (ref 5–25)
CALCIUM SERPL-MCNC: 8.7 MG/DL (ref 8.3–10.1)
CHLORIDE SERPL-SCNC: 108 MMOL/L (ref 100–108)
CLARITY UR: CLEAR
CO2 SERPL-SCNC: 26 MMOL/L (ref 21–32)
COLOR UR: YELLOW
CREAT SERPL-MCNC: 1.11 MG/DL (ref 0.6–1.3)
EOSINOPHIL # BLD AUTO: 0.07 THOUSAND/ΜL (ref 0–0.61)
EOSINOPHIL NFR BLD AUTO: 2 % (ref 0–6)
ERYTHROCYTE [DISTWIDTH] IN BLOOD BY AUTOMATED COUNT: 13.8 % (ref 11.6–15.1)
FLUAV RNA NPH QL NAA+PROBE: NORMAL
FLUBV RNA NPH QL NAA+PROBE: NORMAL
GFR SERPL CREATININE-BSD FRML MDRD: 43 ML/MIN/1.73SQ M
GLUCOSE SERPL-MCNC: 105 MG/DL (ref 65–140)
GLUCOSE UR STRIP-MCNC: NEGATIVE MG/DL
HCT VFR BLD AUTO: 40 % (ref 34.8–46.1)
HGB BLD-MCNC: 13.3 G/DL (ref 11.5–15.4)
HGB UR QL STRIP.AUTO: ABNORMAL
IMM GRANULOCYTES # BLD AUTO: 0.01 THOUSAND/UL (ref 0–0.2)
IMM GRANULOCYTES NFR BLD AUTO: 0 % (ref 0–2)
INR PPP: 0.98 (ref 0.84–1.19)
KETONES UR STRIP-MCNC: NEGATIVE MG/DL
LEUKOCYTE ESTERASE UR QL STRIP: NEGATIVE
LIPASE SERPL-CCNC: 154 U/L (ref 73–393)
LYMPHOCYTES # BLD AUTO: 1.85 THOUSANDS/ΜL (ref 0.6–4.47)
LYMPHOCYTES NFR BLD AUTO: 39 % (ref 14–44)
MCH RBC QN AUTO: 32.5 PG (ref 26.8–34.3)
MCHC RBC AUTO-ENTMCNC: 33.3 G/DL (ref 31.4–37.4)
MCV RBC AUTO: 98 FL (ref 82–98)
MONOCYTES # BLD AUTO: 0.34 THOUSAND/ΜL (ref 0.17–1.22)
MONOCYTES NFR BLD AUTO: 7 % (ref 4–12)
NEUTROPHILS # BLD AUTO: 2.44 THOUSANDS/ΜL (ref 1.85–7.62)
NEUTS SEG NFR BLD AUTO: 51 % (ref 43–75)
NITRITE UR QL STRIP: NEGATIVE
NON-SQ EPI CELLS URNS QL MICRO: ABNORMAL /HPF
NRBC BLD AUTO-RTO: 0 /100 WBCS
PH UR STRIP.AUTO: 7.5 [PH]
PLATELET # BLD AUTO: 208 THOUSANDS/UL (ref 149–390)
PMV BLD AUTO: 9.2 FL (ref 8.9–12.7)
POTASSIUM SERPL-SCNC: 4.5 MMOL/L (ref 3.5–5.3)
PROT SERPL-MCNC: 7.9 G/DL (ref 6.4–8.2)
PROT UR STRIP-MCNC: NEGATIVE MG/DL
PROTHROMBIN TIME: 12.4 SECONDS (ref 11.6–14.5)
RBC # BLD AUTO: 4.09 MILLION/UL (ref 3.81–5.12)
RBC #/AREA URNS AUTO: ABNORMAL /HPF
RSV RNA NPH QL NAA+PROBE: NORMAL
SODIUM SERPL-SCNC: 145 MMOL/L (ref 136–145)
SP GR UR STRIP.AUTO: 1.01 (ref 1–1.03)
TROPONIN I SERPL-MCNC: <0.02 NG/ML
UROBILINOGEN UR QL STRIP.AUTO: 0.2 E.U./DL
WBC # BLD AUTO: 4.74 THOUSAND/UL (ref 4.31–10.16)
WBC #/AREA URNS AUTO: ABNORMAL /HPF

## 2020-01-26 PROCEDURE — 96361 HYDRATE IV INFUSION ADD-ON: CPT

## 2020-01-26 PROCEDURE — 36415 COLL VENOUS BLD VENIPUNCTURE: CPT

## 2020-01-26 PROCEDURE — 85610 PROTHROMBIN TIME: CPT | Performed by: EMERGENCY MEDICINE

## 2020-01-26 PROCEDURE — 81001 URINALYSIS AUTO W/SCOPE: CPT | Performed by: EMERGENCY MEDICINE

## 2020-01-26 PROCEDURE — 87449 NOS EACH ORGANISM AG IA: CPT | Performed by: HOSPITALIST

## 2020-01-26 PROCEDURE — 99285 EMERGENCY DEPT VISIT HI MDM: CPT | Performed by: EMERGENCY MEDICINE

## 2020-01-26 PROCEDURE — 87631 RESP VIRUS 3-5 TARGETS: CPT | Performed by: EMERGENCY MEDICINE

## 2020-01-26 PROCEDURE — 99223 1ST HOSP IP/OBS HIGH 75: CPT | Performed by: HOSPITALIST

## 2020-01-26 PROCEDURE — 85025 COMPLETE CBC W/AUTO DIFF WBC: CPT | Performed by: EMERGENCY MEDICINE

## 2020-01-26 PROCEDURE — 80053 COMPREHEN METABOLIC PANEL: CPT | Performed by: EMERGENCY MEDICINE

## 2020-01-26 PROCEDURE — 83690 ASSAY OF LIPASE: CPT | Performed by: EMERGENCY MEDICINE

## 2020-01-26 PROCEDURE — 71046 X-RAY EXAM CHEST 2 VIEWS: CPT

## 2020-01-26 PROCEDURE — 93005 ELECTROCARDIOGRAM TRACING: CPT

## 2020-01-26 PROCEDURE — 74176 CT ABD & PELVIS W/O CONTRAST: CPT

## 2020-01-26 PROCEDURE — 96374 THER/PROPH/DIAG INJ IV PUSH: CPT

## 2020-01-26 PROCEDURE — 84484 ASSAY OF TROPONIN QUANT: CPT | Performed by: EMERGENCY MEDICINE

## 2020-01-26 PROCEDURE — 71250 CT THORAX DX C-: CPT

## 2020-01-26 PROCEDURE — 99285 EMERGENCY DEPT VISIT HI MDM: CPT

## 2020-01-26 RX ORDER — ONDANSETRON 2 MG/ML
4 INJECTION INTRAMUSCULAR; INTRAVENOUS EVERY 6 HOURS PRN
Status: DISCONTINUED | OUTPATIENT
Start: 2020-01-26 | End: 2020-01-30 | Stop reason: HOSPADM

## 2020-01-26 RX ORDER — FAMOTIDINE 20 MG/1
20 TABLET, FILM COATED ORAL DAILY
Status: DISCONTINUED | OUTPATIENT
Start: 2020-01-26 | End: 2020-01-30 | Stop reason: HOSPADM

## 2020-01-26 RX ORDER — LISINOPRIL 20 MG/1
20 TABLET ORAL 2 TIMES DAILY
Status: DISCONTINUED | OUTPATIENT
Start: 2020-01-26 | End: 2020-01-27

## 2020-01-26 RX ORDER — FAMOTIDINE 20 MG/1
20 TABLET, FILM COATED ORAL 2 TIMES DAILY
Status: DISCONTINUED | OUTPATIENT
Start: 2020-01-26 | End: 2020-01-26

## 2020-01-26 RX ORDER — HYDROCODONE BITARTRATE AND ACETAMINOPHEN 5; 325 MG/1; MG/1
1 TABLET ORAL EVERY 6 HOURS PRN
Status: DISCONTINUED | OUTPATIENT
Start: 2020-01-26 | End: 2020-01-30 | Stop reason: HOSPADM

## 2020-01-26 RX ORDER — ROPINIROLE 1 MG/1
2 TABLET, FILM COATED ORAL
Status: DISCONTINUED | OUTPATIENT
Start: 2020-01-26 | End: 2020-01-30 | Stop reason: HOSPADM

## 2020-01-26 RX ORDER — HYDRALAZINE HYDROCHLORIDE 10 MG/1
10 TABLET, FILM COATED ORAL 2 TIMES DAILY PRN
Status: DISCONTINUED | OUTPATIENT
Start: 2020-01-26 | End: 2020-01-30 | Stop reason: HOSPADM

## 2020-01-26 RX ORDER — ACETAMINOPHEN 325 MG/1
650 TABLET ORAL EVERY 6 HOURS PRN
Status: DISCONTINUED | OUTPATIENT
Start: 2020-01-26 | End: 2020-01-30 | Stop reason: HOSPADM

## 2020-01-26 RX ORDER — TRAZODONE HYDROCHLORIDE 50 MG/1
50 TABLET ORAL
Status: DISCONTINUED | OUTPATIENT
Start: 2020-01-26 | End: 2020-01-30 | Stop reason: HOSPADM

## 2020-01-26 RX ORDER — ASPIRIN 81 MG/1
81 TABLET, CHEWABLE ORAL 2 TIMES DAILY
Status: DISCONTINUED | OUTPATIENT
Start: 2020-01-26 | End: 2020-01-30 | Stop reason: HOSPADM

## 2020-01-26 RX ORDER — SODIUM CHLORIDE 9 MG/ML
125 INJECTION, SOLUTION INTRAVENOUS CONTINUOUS
Status: DISCONTINUED | OUTPATIENT
Start: 2020-01-26 | End: 2020-01-26

## 2020-01-26 RX ORDER — CYCLOBENZAPRINE HCL 10 MG
5 TABLET ORAL 3 TIMES DAILY PRN
Status: DISCONTINUED | OUTPATIENT
Start: 2020-01-26 | End: 2020-01-30 | Stop reason: HOSPADM

## 2020-01-26 RX ORDER — GUAIFENESIN 100 MG/5ML
200 SOLUTION ORAL EVERY 4 HOURS PRN
Status: DISCONTINUED | OUTPATIENT
Start: 2020-01-26 | End: 2020-01-30 | Stop reason: HOSPADM

## 2020-01-26 RX ORDER — SODIUM CHLORIDE 9 MG/ML
75 INJECTION, SOLUTION INTRAVENOUS CONTINUOUS
Status: DISCONTINUED | OUTPATIENT
Start: 2020-01-26 | End: 2020-01-29

## 2020-01-26 RX ORDER — AMIODARONE HYDROCHLORIDE 200 MG/1
100 TABLET ORAL DAILY
Status: DISCONTINUED | OUTPATIENT
Start: 2020-01-26 | End: 2020-01-30 | Stop reason: HOSPADM

## 2020-01-26 RX ORDER — AMLODIPINE BESYLATE 10 MG/1
10 TABLET ORAL DAILY
Status: DISCONTINUED | OUTPATIENT
Start: 2020-01-26 | End: 2020-01-30 | Stop reason: HOSPADM

## 2020-01-26 RX ORDER — ALBUTEROL SULFATE 2.5 MG/3ML
2.5 SOLUTION RESPIRATORY (INHALATION) ONCE
Status: DISCONTINUED | OUTPATIENT
Start: 2020-01-26 | End: 2020-01-26

## 2020-01-26 RX ORDER — ONDANSETRON 2 MG/ML
4 INJECTION INTRAMUSCULAR; INTRAVENOUS ONCE
Status: COMPLETED | OUTPATIENT
Start: 2020-01-26 | End: 2020-01-26

## 2020-01-26 RX ORDER — GABAPENTIN 100 MG/1
200 CAPSULE ORAL 3 TIMES DAILY
Status: DISCONTINUED | OUTPATIENT
Start: 2020-01-26 | End: 2020-01-27

## 2020-01-26 RX ORDER — LEVALBUTEROL INHALATION SOLUTION 0.63 MG/3ML
0.63 SOLUTION RESPIRATORY (INHALATION) EVERY 8 HOURS PRN
Status: DISCONTINUED | OUTPATIENT
Start: 2020-01-26 | End: 2020-01-30 | Stop reason: HOSPADM

## 2020-01-26 RX ORDER — ALPRAZOLAM 0.25 MG/1
0.25 TABLET ORAL 2 TIMES DAILY PRN
Status: DISCONTINUED | OUTPATIENT
Start: 2020-01-26 | End: 2020-01-30 | Stop reason: HOSPADM

## 2020-01-26 RX ORDER — DORZOLAMIDE HYDROCHLORIDE AND TIMOLOL MALEATE 20; 5 MG/ML; MG/ML
1 SOLUTION/ DROPS OPHTHALMIC 2 TIMES DAILY
Status: DISCONTINUED | OUTPATIENT
Start: 2020-01-26 | End: 2020-01-30 | Stop reason: HOSPADM

## 2020-01-26 RX ADMIN — AZITHROMYCIN MONOHYDRATE 500 MG: 500 INJECTION, POWDER, LYOPHILIZED, FOR SOLUTION INTRAVENOUS at 18:30

## 2020-01-26 RX ADMIN — CEFTRIAXONE SODIUM 1000 MG: 10 INJECTION, POWDER, FOR SOLUTION INTRAVENOUS at 17:02

## 2020-01-26 RX ADMIN — ASPIRIN 81 MG 81 MG: 81 TABLET ORAL at 17:02

## 2020-01-26 RX ADMIN — LISINOPRIL 20 MG: 20 TABLET ORAL at 17:02

## 2020-01-26 RX ADMIN — GABAPENTIN 100 MG: 100 CAPSULE ORAL at 20:06

## 2020-01-26 RX ADMIN — DORZOLAMIDE HYDROCHLORIDE AND TIMOLOL MALEATE 1 DROP: 20; 5 SOLUTION/ DROPS OPHTHALMIC at 18:31

## 2020-01-26 RX ADMIN — SODIUM CHLORIDE 75 ML/HR: 0.9 INJECTION, SOLUTION INTRAVENOUS at 16:48

## 2020-01-26 RX ADMIN — SODIUM CHLORIDE 125 ML/HR: 0.9 INJECTION, SOLUTION INTRAVENOUS at 13:09

## 2020-01-26 RX ADMIN — ROPINIROLE HYDROCHLORIDE 2 MG: 1 TABLET, FILM COATED ORAL at 21:21

## 2020-01-26 RX ADMIN — GABAPENTIN 100 MG: 100 CAPSULE ORAL at 16:47

## 2020-01-26 RX ADMIN — ONDANSETRON 4 MG: 2 INJECTION INTRAMUSCULAR; INTRAVENOUS at 10:09

## 2020-01-26 RX ADMIN — TRAZODONE HYDROCHLORIDE 50 MG: 50 TABLET ORAL at 21:21

## 2020-01-26 RX ADMIN — AMLODIPINE BESYLATE 10 MG: 10 TABLET ORAL at 16:47

## 2020-01-26 RX ADMIN — ACETAMINOPHEN 650 MG: 325 TABLET, FILM COATED ORAL at 17:14

## 2020-01-26 RX ADMIN — SODIUM CHLORIDE 500 ML: 0.9 INJECTION, SOLUTION INTRAVENOUS at 09:03

## 2020-01-26 RX ADMIN — FAMOTIDINE 20 MG: 20 TABLET ORAL at 16:47

## 2020-01-26 NOTE — ASSESSMENT & PLAN NOTE
Patient presented with URI like symptoms and chest congestion with cough with mucoid expectoration 4 days ago  No fever  Then developed extra pulmonary symptoms like confusion and vomiting  Need to rule out Legionella given that she is elderly, symptoms started with pulmonary symptoms and now has confusion and vomiting  Has hyponatremia as outpatient however currently sodium is 145 this could be related to hemoconcentration  Will treat her with macrolides till Legionella results back  Sent blood cultures urine cultures and Legionella  Continue IV hydration  Influenza and RSV are negative  It is also likely that she could have a viral bronchitis but would continue with macrolides till Legionella levels are back

## 2020-01-26 NOTE — RESPIRATORY THERAPY NOTE
RT Protocol Note  Balaji Nolan 80 y o  female MRN: [id number]  Unit/Bed#: S -01 Encounter: [id number]    Assessment    Principal Problem:    Pneumonia due to infectious organism  Active Problems:    Chronic pain    Lumbar stenosis with neurogenic claudication    Peripheral neuropathy    Constipation due to pain medication    Continuous opioid dependence ([address] )    Essential hypertension    Acute metabolic encephalopathy      Home Pulmonary Medications:         Past Medical History:   Diagnosis Date    Chest pain     last assessed-4/15/2013    Chronic pain     back    Gait disturbance     last assessed-2/12/2014    Hypertension     Hyponatremia     Hypoxemia     last assessed-1/5/2015    Melena     last assessed-2/23/2017    Rotator cuff injury     Wheezing     last assessed-1/5/2015     Social History     Socioeconomic History    Marital status:       Spouse name: None    Number of children: None    Years of education: None    Highest education level: None   Occupational History    None   Social Needs    Financial resource strain: None    Food insecurity:     Worry: None     Inability: None    Transportation needs:     Medical: None     Non-medical: None   Tobacco Use    Smoking status: Never Smoker    Smokeless tobacco: Never Used   Substance and Sexual Activity    Alcohol use: No    Drug use: No    Sexual activity: None   Lifestyle    Physical activity:     Days per week: None     Minutes per session: None    Stress: None   Relationships    Social connections:     Talks on phone: None     Gets together: None     Attends Shinto service: None     Active member of club or organization: None     Attends meetings of clubs or organizations: None     Relationship status: None    Intimate partner violence:     Fear of current or ex partner: None     Emotionally abused: None     Physically abused: None     Forced sexual activity: None   Other Topics Concern    None   Social History Narrative    Lives with family       Subjective         Objective    Physical Exam:   Assessment Type: (P) Assess only  General Appearance: (P) Alert, Awake  Respiratory Pattern: (P) Normal  Chest Assessment: (P) Chest expansion symmetrical  Bilateral Breath Sounds: (P) Coarse    Vitals:  Blood pressure 168/75, pulse 79, temperature 98 3 °F (36 8 °C), temperature source Oral, resp  rate 16, weight 65 kg (143 lb 4 8 oz), SpO2 96 %  Imaging and other studies: I have personally reviewed pertinent reports  Plan    Respiratory Plan: (P) Mild Distress pathway        Resp Comments: (P) Patient examined per RT protocol  SPO2 97%  Breath sound coarse bilaterally  Will keep PRN order

## 2020-01-26 NOTE — ASSESSMENT & PLAN NOTE
Blood pressure has been labile for her  She is currently on amlodipine 10 mg daily, hydralazine 10 mg b i d  PRN, lisinopril

## 2020-01-26 NOTE — H&P
H&P- Jason Stewart 9/20/1926, 80 y o  female MRN: [id number]    Unit/Bed#: ED 24 Encounter: [id number]    Primary Care Provider: Monika Ahumada, DO   Date and time admitted to hospital: 1/26/2020  8:25 AM        Acute metabolic encephalopathy  Assessment & Plan  Patient admitted with confusion  Normally patient is very sharp in her mentation  The started about 3-4 days ago with URI like symptoms followed by confusion followed by vomiting  Constellation of symptoms suggestive of Legionella with predominantly extrapulmonary symptoms like confusion, GI symptoms  Patient has chronic hyponatremia but sodium normal during this admission could likely be secondary to hemoconcentration  Will continue IV hydration as well as start macrolide  (Levaquin would not be a good choice in an elderly patient)    * Pneumonia due to infectious organism  Assessment & Plan  Patient presented with URI like symptoms and chest congestion with cough with mucoid expectoration 4 days ago  No fever  Then developed extra pulmonary symptoms like confusion and vomiting  Need to rule out Legionella given that she is elderly, symptoms started with pulmonary symptoms and now has confusion and vomiting  Has hyponatremia as outpatient however currently sodium is 145 this could be related to hemoconcentration  Will treat her with macrolides till Legionella results back  Sent blood cultures urine cultures and Legionella  Continue IV hydration  Influenza and RSV are negative  It is also likely that she could have a viral bronchitis but would continue with macrolides till Legionella levels are back  Chronic pain  Assessment & Plan  Patient with chronic pain secondary to severe peripheral neuropathy  She is on Vicodin  Recently her gabapentin dose was increased by her PCP  Constipation due to pain medication  Assessment & Plan  Will keep her on a stool softener      Essential hypertension  Assessment & Plan  Blood pressure has been labile for her  She is currently on amlodipine 10 mg daily, hydralazine 10 mg b i d  PRN, lisinopril    Lumbar stenosis with neurogenic claudication  Assessment & Plan  Severe lumbar stenosis with continue opioid dependence  On Vicodin and gabapentin  Not a surgical candidate  Peripheral neuropathy  Assessment & Plan  Severe peripheral neuropathy with continuous opioid dependence  Continue Vicodin and gabapentin  Continuous opioid dependence [facility])  Assessment & Plan  Please see notes above about continual opioid dependence  History of Present Illness     HPI:  Silvia Adrian is a 80 y o  female who speaks [place]  Daughter is the main historian  Patient started 4 days ago with cough, chest congestion and wheezing  She was treated symptomatically and daughter also tried applying Vaporub without much improvement in her symptoms  Patient then developed generalized weakness and ambulatory dysfunction  Patient had an near fall 2 days ago however daughter was able to catch her just in time  Over the past 2 days daughter noticed increased confusion and increased tendency to sleep  Per the daughter she is very sharp mentally and this is very different for her  Because of the confusion she had difficulty giving her her medications  Says at about 630 this morning when the patient woke up she vomited twice   nonprojectile bilious   She has not noticed any fevers, diarrhea, exposure to sick contacts  She has lumbar stenosis and is on opioids for a long time  In the ED the patient is confused, CBC and BMP are normal   A CT was done which did not reveal evidence of any infiltrates or abnormalities in the abdomen  Patient has severe peripheral neuropathy and her gabapentin was recently increased by her PCP in an effort to alleviate some of her pain          Historical Information   Past Medical History:   Diagnosis Date    Chest pain     last assessed-4/15/2013    Chronic pain     back    Gait disturbance     last assessed-2/12/2014    Hypertension     Hyponatremia     Hypoxemia     last assessed-1/5/2015    Melena     last assessed-2/23/2017    Rotator cuff injury     Wheezing     last assessed-1/5/2015     Patient Active Problem List   Diagnosis    Chronic pain    Postural dizziness with near syncope    Primary insomnia    Lumbar stenosis with neurogenic claudication    Acute pain of right shoulder    Arthritis of right shoulder region    Pain in joint of right elbow    Effusion of right shoulder joint    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)    Restless legs syndrome    Peripheral neuropathy    Mixed stress and urge urinary incontinence    Medicare annual wellness visit, subsequent    Constipation due to pain medication    Paroxysmal atrial fibrillation (HCC)    Chronic Chest and Abdominal Pain    Hyponatremia    Continuous opioid dependence (HCC)    Heart block    [facility]) ([facility] [address] )    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    Essential hypertension    Acute metabolic encephalopathy    Pneumonia due to infectious organism     Past Surgical History:   Procedure Laterality Date    APPENDECTOMY      BACK SURGERY      EYE SURGERY         Social History   Social History     Substance and Sexual Activity   Alcohol Use No     Social History     Substance and Sexual Activity   Drug Use No     Social History     Tobacco Use   Smoking Status Never Smoker   Smokeless Tobacco Never Used       Family History:   Family History   Problem Relation Age of Onset    Heart attack Mother        Meds/Allergies       Current Facility-Administered Medications:     albuterol inhalation solution 2 5 mg, 2 5 mg, Nebulization, Once, Afia Weldon DO    sodium chloride 0 9 % infusion, 125 mL/hr, Intravenous, Continuous, Afia Weldon DO, Last Rate: 125 mL/hr at 01/26/20 1309, 125 mL/hr at 01/26/20 1309    Current Outpatient Medications:    acetaminophen (TYLENOL) 325 mg tablet, Take 2 tablets (650 mg total) by mouth every 6 (six) hours as needed for mild pain, headaches or fever, Disp: 30 tablet, Rfl: 0    ALPRAZolam (XANAX) 0 25 mg tablet, Take 1 tablet (0 25 mg total) by mouth 2 (two) times a day as needed for anxiety or sleep, Disp: 60 tablet, Rfl: 5    amiodarone 200 mg tablet, Take 0 5 tablets (100 mg total) by mouth daily, Disp: 90 tablet, Rfl: 3    amLODIPine (NORVASC) 10 mg tablet, Take 1 tablet (10 mg total) by mouth daily, Disp: 90 tablet, Rfl: 3    aspirin 81 MG tablet, Take 81 mg by mouth 2 (two) times a day, Disp: , Rfl:     CARAFATE 1 GM/10ML suspension, , Disp: , Rfl: 0    cyclobenzaprine (FLEXERIL) 5 mg tablet, Take 1 tablet (5 mg total) by mouth 3 (three) times a day as needed for muscle spasms, Disp: 90 tablet, Rfl: 0    dorzolamide-timolol (COSOPT) 22 3-6 8 MG/ML ophthalmic solution, Administer 1 drop into the left eye 2 (two) times a day, Disp: , Rfl:     famotidine (PEPCID) 20 mg tablet, Take 1 tablet (20 mg total) by mouth 2 (two) times a day, Disp: 180 tablet, Rfl: 3    gabapentin (NEURONTIN) 100 mg capsule, Take 2 capsules (200 mg total) by mouth 3 (three) times a day, Disp: 540 capsule, Rfl: 3    hydrALAZINE (APRESOLINE) 10 mg tablet, Take 1 tablet (10 mg total) by mouth 2 (two) times a day as needed (For SBP>160 or DBP>90 consistently, start taking this medication ), Disp: 180 tablet, Rfl: 0    HYDROcodone-acetaminophen (NORCO) 5-325 mg per tablet, Take 1 tablet by mouth every 6 (six) hours as needed for painMax Daily Amount: 4 tablets, Disp: 90 tablet, Rfl: 0    linaCLOtide 145 MCG CAPS, Take 1 capsule (145 mcg total) by mouth daily, Disp: 90 capsule, Rfl: 3    lisinopril (ZESTRIL) 20 mg tablet, , Disp: , Rfl: 0    lisinopril (ZESTRIL) 20 mg tablet, take 1 tablet by mouth twice a day, Disp: 90 tablet, Rfl: 0    lisinopril (ZESTRIL) 40 mg tablet, Take 1 tablet (40 mg total) by mouth daily, Disp: 90 tablet, Rfl: 3    meclizine (ANTIVERT) 25 mg tablet, Take 1 tablet (25 mg total) by mouth every 8 (eight) hours as needed for dizziness, Disp: 30 tablet, Rfl: 0    Multiple Vitamins-Minerals (ICAPS AREDS 2 PO), Take 1 tablet by mouth 2 (two) times a day, Disp: , Rfl:     Omega-3 Fatty Acids (FISH OIL) 1,000 mg, Take 1,000 mg by mouth 2 (two) times a day, Disp: , Rfl:     pantoprazole (PROTONIX) 40 mg tablet, Take 1 tablet (40 mg total) by mouth 2 (two) times a day before meals for 30 days, Disp: 60 tablet, Rfl: 0    rOPINIRole (REQUIP) 2 mg tablet, take 1 tablet by mouth at bedtime, Disp: 90 tablet, Rfl: 3    senna-docusate sodium (SENOKOT S) 8 6-50 mg per tablet, Take 2 tablets by mouth 2 (two) times a day for 30 days, Disp: 120 tablet, Rfl: 3    sodium chloride 1 g tablet, Take 2 tablets (2 g total) by mouth daily, Disp: 60 tablet, Rfl: 0    traZODone (DESYREL) 50 mg tablet, take 1 tablet by mouth at bedtime, Disp: 90 tablet, Rfl: 3    Allergies   Allergen Reactions    Chlorthalidone Other (See Comments)     Severe Hyponatremia (sodium 115)    Morphine GI Intolerance     "acts drunk"       Review of Systems  A detailed 12 point review of systems was conducted and is negative apart from those mentioned in the HPI  Patient is legally blind        Objective   Vitals: Blood pressure 166/74, pulse 78, temperature 98 1 °F (36 7 °C), temperature source Oral, resp  rate 18, weight 65 2 kg (143 lb 11 8 oz), SpO2 93 %  Physical Exam   Lethargic but arousable  HEENT: PERRLA, EOMI, sclera anicteric, dry mucous membranes, tongue mucosa dry without lesions  Neck: supple, no JVD, lymphadenopathy, thyromegaly  Heart: Regular rate and rhythm, S1S2 present  No murmur, rub or gallop  Lungs; Clear to auscultation bilaterally  No wheezing, crackles or rhonchi  Few bibasilar rhonchi  Abdomen: soft, non-tender, non-distended, NABS  No guarding or rebound  No peritoneal sound or mass    Extremities: no clubbing, cyanosis, or edema  2+ pedal pulses bilaterally  Full range of motion  Neurologic:  Unable to examine secondary to encephalopathy  Moving all 4 limbs  Skin: warm and dry  No petechiae, purpura or rash  Lab Results:    Results from last 7 days   Lab Units 01/26/20  0859   WBC Thousand/uL 4 74   HEMOGLOBIN g/dL 13 3   HEMATOCRIT % 40 0   PLATELETS Thousands/uL 208   NEUTROS PCT % 51   LYMPHS PCT % 39   MONOS PCT % 7   EOS PCT % 2     Results from last 7 days   Lab Units 01/26/20  0859   POTASSIUM mmol/L 4 5   CHLORIDE mmol/L 108   CO2 mmol/L 26   BUN mg/dL 15   CREATININE mg/dL 1 11   CALCIUM mg/dL 8 7   ALK PHOS U/L 96   ALT U/L 14   AST U/L 22     Results from last 7 days   Lab Units 01/26/20  0859   INR  0 98           Imaging:  CT chest abdomen and pelvis-LUNGS:  Mosaic pattern of attenuation in both lungs, most likely due to chronic small airway disease  Scattered areas of subsegmental atelectasis  No mass or consolidation  Colonic diverticulosis without evidence of acute diverticulitis  3   3 4 x 2 5 x 3 8 cm right adnexal cyst, unchanged since a CT from 5/10/2019    EKG-normal sinus rhythm without evidence of any ischemia or arrhythmia  Code Status:  Unknown  We will keep her as a full code till her wishes are known  She is currently encephalopathy can daughter not available  Counseling / Coordination of Care  Total floor / unit time spent today 33 minutes  Greater than 50% of total time was spent with the patient and / or family counseling and / or coordination of care  Portions of the record may have been created with voice recognition software  Occasional wrong word or "sound a like" substitutions may have occurred due to the inherent limitations of voice recognition software  Read the chart carefully and recognize, using context, where substitutions have occurred

## 2020-01-26 NOTE — ASSESSMENT & PLAN NOTE
Patient admitted with confusion  Normally patient is very sharp in her mentation  The started about 3-4 days ago with URI like symptoms followed by confusion followed by vomiting  Constellation of symptoms suggestive of Legionella with predominantly extrapulmonary symptoms like confusion, GI symptoms  Patient has chronic hyponatremia but sodium normal during this admission could likely be secondary to hemoconcentration  Will continue IV hydration as well as start macrolide  (Levaquin would not be a good choice in an elderly patient)

## 2020-01-26 NOTE — ASSESSMENT & PLAN NOTE
Patient with chronic pain secondary to severe peripheral neuropathy  She is on Vicodin  Recently her gabapentin dose was increased by her PCP

## 2020-01-26 NOTE — ED PROVIDER NOTES
History  Chief Complaint   Patient presents with    Vomiting     pt c/o vomiting starting this morning x2, daughter states she has been coughing and wheezing for the past 4 days  (Pt only speaks [place], daughter translates for her)     77-year-old female presents to the emergency department with her daughter for evaluation a constellation of symptoms  Patient's daughter states that 4 days ago she developed cough with chest congestion and wheezing  The daughter had been applying Vaporub without much improvement in symptoms  The patient has been very weak and had an episode where she nearly fell to the ground earlier today  She has had episodes of confusion and patient's daughter states that this is very unlike her  The patient is very sharp and normally oriented  The past 4 days she has had episodes where she was confused about events that occurred  She had difficulty taking her medications due to confusion  The patient's daughter states this morning she woke up around 630 and then had a few episodes of vomiting  She has been complaining of nausea and epigastric discomfort  She denies chest pain  No reported fevers  No change in bowel habits  History provided by:  Patient and medical records  Vomiting   Severity:  Severe  Timing:  Unable to specify  Quality:  Unable to specify  Progression:  Resolved  Chronicity:  New  Recent urination:  Normal  Context: not post-tussive    Relieved by:  Nothing  Worsened by:  Nothing  Ineffective treatments:  None tried  Associated symptoms: abdominal pain and cough    Associated symptoms: no chills, no diarrhea, no fever and no headaches    Risk factors: no sick contacts        Prior to Admission Medications   Prescriptions Last Dose Informant Patient Reported? Taking?    ALPRAZolam (XANAX) 0 25 mg tablet   No No   Sig: Take 1 tablet (0 25 mg total) by mouth 2 (two) times a day as needed for anxiety or sleep   CARAFATE 1 GM/10ML suspension   Yes No HYDROcodone-acetaminophen (NORCO) 5-325 mg per tablet   No No   Sig: Take 1 tablet by mouth every 6 (six) hours as needed for painMax Daily Amount: 4 tablets   Multiple Vitamins-Minerals (ICAPS AREDS 2 PO)   Yes No   Sig: Take 1 tablet by mouth 2 (two) times a day   Omega-3 Fatty Acids (FISH OIL) 1,000 mg   Yes No   Sig: Take 1,000 mg by mouth 2 (two) times a day   acetaminophen (TYLENOL) 325 mg tablet   No No   Sig: Take 2 tablets (650 mg total) by mouth every 6 (six) hours as needed for mild pain, headaches or fever   amLODIPine (NORVASC) 10 mg tablet   No No   Sig: Take 1 tablet (10 mg total) by mouth daily   amiodarone 200 mg tablet   No No   Sig: Take 0 5 tablets (100 mg total) by mouth daily   aspirin 81 MG tablet   Yes No   Sig: Take 81 mg by mouth 2 (two) times a day   cyclobenzaprine (FLEXERIL) 5 mg tablet   No No   Sig: Take 1 tablet (5 mg total) by mouth 3 (three) times a day as needed for muscle spasms   dorzolamide-timolol (COSOPT) 22 3-6 8 MG/ML ophthalmic solution  Child Yes No   Sig: Administer 1 drop into the left eye 2 (two) times a day   famotidine (PEPCID) 20 mg tablet   No No   Sig: Take 1 tablet (20 mg total) by mouth 2 (two) times a day   gabapentin (NEURONTIN) 100 mg capsule   No No   Sig: Take 2 capsules (200 mg total) by mouth 3 (three) times a day   hydrALAZINE (APRESOLINE) 10 mg tablet   No No   Sig: Take 1 tablet (10 mg total) by mouth 2 (two) times a day as needed (For SBP>160 or DBP>90 consistently, start taking this medication )   linaCLOtide 145 MCG CAPS   No No   Sig: Take 1 capsule (145 mcg total) by mouth daily   lisinopril (ZESTRIL) 20 mg tablet   Yes No   lisinopril (ZESTRIL) 20 mg tablet   No No   Sig: take 1 tablet by mouth twice a day   lisinopril (ZESTRIL) 40 mg tablet   No No   Sig: Take 1 tablet (40 mg total) by mouth daily   meclizine (ANTIVERT) 25 mg tablet   No No   Sig: Take 1 tablet (25 mg total) by mouth every 8 (eight) hours as needed for dizziness   pantoprazole (PROTONIX) 40 mg tablet   No No   Sig: Take 1 tablet (40 mg total) by mouth 2 (two) times a day before meals for 30 days   rOPINIRole (REQUIP) 2 mg tablet   No No   Sig: take 1 tablet by mouth at bedtime   senna-docusate sodium (SENOKOT S) 8 6-50 mg per tablet   No No   Sig: Take 2 tablets by mouth 2 (two) times a day for 30 days   sodium chloride 1 g tablet   No No   Sig: Take 2 tablets (2 g total) by mouth daily   traZODone (DESYREL) 50 mg tablet   No No   Sig: take 1 tablet by mouth at bedtime      Facility-Administered Medications: None       Past Medical History:   Diagnosis Date    Chest pain     last assessed-4/15/2013    Chronic pain     back    Gait disturbance     last assessed-2/12/2014    Hypertension     Hyponatremia     Hypoxemia     last assessed-1/5/2015    Melena     last assessed-2/23/2017    Rotator cuff injury     Wheezing     last assessed-1/5/2015       Past Surgical History:   Procedure Laterality Date    APPENDECTOMY      BACK SURGERY      EYE SURGERY         Family History   Problem Relation Age of Onset    Heart attack Mother      I have reviewed and agree with the history as documented  Social History     Tobacco Use    Smoking status: Never Smoker    Smokeless tobacco: Never Used   Substance Use Topics    Alcohol use: No    Drug use: No        Review of Systems   Constitutional: Positive for appetite change  Negative for chills and fever  Respiratory: Positive for cough and wheezing  Gastrointestinal: Positive for abdominal pain and vomiting  Negative for diarrhea  Genitourinary: Negative for dysuria  Neurological: Positive for weakness  Negative for headaches  Psychiatric/Behavioral: Positive for confusion  All other systems reviewed and are negative  Physical Exam  Physical Exam   Constitutional: She is oriented to person, place, and time  She appears well-developed and well-nourished  No distress  HENT:   Head: Normocephalic     Nose: Nose normal  Mouth/Throat: Oropharynx is clear and moist  No oropharyngeal exudate  Eyes: Pupils are equal, round, and reactive to light  Conjunctivae and EOM are normal    Neck: Normal range of motion  Neck supple  Cardiovascular: Normal rate, regular rhythm, normal heart sounds and intact distal pulses  Pulmonary/Chest: Effort normal  No respiratory distress  She has wheezes  She has rhonchi  Abdominal: Soft  Bowel sounds are normal  She exhibits no distension  There is generalized tenderness  There is no rebound and no guarding  Musculoskeletal: Normal range of motion  She exhibits no edema, tenderness or deformity  Lymphadenopathy:     She has no cervical adenopathy  Neurological: She is alert and oriented to person, place, and time  She has normal strength and normal reflexes  No cranial nerve deficit or sensory deficit  She exhibits normal muscle tone  Coordination and gait normal    Skin: Skin is warm, dry and intact  No rash noted  Psychiatric: She has a normal mood and affect  Her speech is normal  Judgment normal    Nursing note and vitals reviewed        Vital Signs  ED Triage Vitals [01/26/20 0832]   Temperature Pulse Respirations Blood Pressure SpO2   98 1 °F (36 7 °C) 81 18 169/77 93 %      Temp Source Heart Rate Source Patient Position - Orthostatic VS BP Location FiO2 (%)   Oral Monitor Lying Right arm --      Pain Score       No Pain           Vitals:    01/29/20 0700 01/29/20 1500 01/29/20 2200 01/30/20 0700   BP: 136/75 148/70 135/70 141/95   Pulse: 81 76 72 79   Patient Position - Orthostatic VS: Sitting Lying Lying Lying         Visual Acuity      ED Medications  Medications   sodium chloride 0 9 % bolus 500 mL (0 mL Intravenous Stopped 1/26/20 1309)   ondansetron (ZOFRAN) injection 4 mg (4 mg Intravenous Given 1/26/20 1009)       Diagnostic Studies  Results Reviewed     Procedure Component Value Units Date/Time    Urine Microscopic [[id number]]  (Abnormal) Collected:  01/26/20 1000    Lab Status:  Final result Specimen:  Urine, Clean Catch Updated:  01/26/20 1235     RBC, UA None Seen /hpf      WBC, UA 0-1 /hpf      Epithelial Cells None Seen /hpf      Bacteria, UA Occasional /hpf     UA w Reflex to Microscopic w Reflex to Culture [[id number]]  (Abnormal) Collected:  01/26/20 1000    Lab Status:  Final result Specimen:  Urine, Clean Catch Updated:  01/26/20 1052     Color, UA Yellow     Clarity, UA Clear     Specific Gravity, UA 1 015     pH, UA 7 5     Leukocytes, UA Negative     Nitrite, UA Negative     Protein, UA Negative mg/dl      Glucose, UA Negative mg/dl      Ketones, UA Negative mg/dl      Urobilinogen, UA 0 2 E U /dl      Bilirubin, UA Negative     Blood, UA Small    Influenza A/B and RSV PCR [[id number]]  (Normal) Collected:  01/26/20 0926    Lab Status:  Final result Specimen:  Nose Updated:  01/26/20 1029     INFLUENZA A PCR None Detected     INFLUENZA B PCR None Detected     RSV PCR None Detected    Troponin I [[id number]]  (Normal) Collected:  01/26/20 0926    Lab Status:  Final result Specimen:  Blood from Arm, Right Updated:  01/26/20 0950     Troponin I <0 02 ng/mL     Comprehensive metabolic panel [[id number]]  (Abnormal) Collected:  01/26/20 0859    Lab Status:  Final result Specimen:  Blood from Arm, Right Updated:  01/26/20 0923     Sodium 145 mmol/L      Potassium 4 5 mmol/L      Chloride 108 mmol/L      CO2 26 mmol/L      ANION GAP 11 mmol/L      BUN 15 mg/dL      Creatinine 1 11 mg/dL      Glucose 105 mg/dL      Calcium 8 7 mg/dL      AST 22 U/L      ALT 14 U/L      Alkaline Phosphatase 96 U/L      Total Protein 7 9 g/dL      Albumin 3 3 g/dL      Total Bilirubin 0 27 mg/dL      eGFR 43 ml/min/1 73sq m     Narrative:       Cathie guidelines for Chronic Kidney Disease (CKD):     Stage 1 with normal or high GFR (GFR > 90 mL/min/1 73 square meters)    Stage 2 Mild CKD (GFR = 60-89 mL/min/1 73 square meters)    Stage 3A Moderate CKD (GFR = 45-59 mL/min/1 73 square meters)    Stage 3B Moderate CKD (GFR = 30-44 mL/min/1 73 square meters)    Stage 4 Severe CKD (GFR = 15-29 mL/min/1 73 square meters)    Stage 5 End Stage CKD (GFR <15 mL/min/1 73 square meters)  Note: GFR calculation is accurate only with a steady state creatinine    Lipase [[id number]]  (Normal) Collected:  01/26/20 0859    Lab Status:  Final result Specimen:  Blood from Arm, Right Updated:  01/26/20 0923     Lipase 154 u/L     Protime-INR [[id number]]  (Normal) Collected:  01/26/20 0859    Lab Status:  Final result Specimen:  Blood from Arm, Right Updated:  01/26/20 0917     Protime 12 4 seconds      INR 0 98    CBC and differential [[id number]] Collected:  01/26/20 0859    Lab Status:  Final result Specimen:  Blood from Arm, Right Updated:  01/26/20 0914     WBC 4 74 Thousand/uL      RBC 4 09 Million/uL      Hemoglobin 13 3 g/dL      Hematocrit 40 0 %      MCV 98 fL      MCH 32 5 pg      MCHC 33 3 g/dL      RDW 13 8 %      MPV 9 2 fL      Platelets 850 Thousands/uL      nRBC 0 /100 WBCs      Neutrophils Relative 51 %      Immat GRANS % 0 %      Lymphocytes Relative 39 %      Monocytes Relative 7 %      Eosinophils Relative 2 %      Basophils Relative 1 %      Neutrophils Absolute 2 44 Thousands/µL      Immature Grans Absolute 0 01 Thousand/uL      Lymphocytes Absolute 1 85 Thousands/µL      Monocytes Absolute 0 34 Thousand/µL      Eosinophils Absolute 0 07 Thousand/µL      Basophils Absolute 0 03 Thousands/µL                  MRI brain wo contrast   Final Result by Gumaro Rodriguez DO (01/29 1148)      White matter changes suggestive of chronic microangiopathy  No acute intracranial pathology  Workstation performed: [id number]         CT chest abdomen pelvis wo contrast   Final Result by Mook Monique MD (01/26 1204)      1  No evidence of acute abnormality in the chest, abdomen or pelvis  2   Colonic diverticulosis without evidence of acute diverticulitis     3   3 4 x 2 5 x 3 8 cm right adnexal cyst, unchanged since a CT from 5/10/2019  If clinically indicated, this can be better characterized with nonemergent pelvic ultrasound  Workstation performed: [id number]         XR chest 2 views   Final Result by Smitha Hein MD (01/27 3109)      No acute cardiopulmonary disease              Workstation performed: [id number]                    Procedures  ECG 12 Lead Documentation Only  Date/Time: 1/26/2020 1:11 PM  Performed by: Melton Libman, DO  Authorized by: Melton Libman, DO     Indications / Diagnosis:  Confusion  ECG reviewed by me, the ED Provider: yes    Patient location:  ED  Previous ECG:     Previous ECG:  Compared to current    Comparison ECG info:  5/19    Similarity:  No change  Interpretation:     Interpretation: abnormal    Rate:     ECG rate:  79    ECG rate assessment: normal    Rhythm:     Rhythm: sinus rhythm    Ectopy:     Ectopy: none    QRS:     QRS axis:  Left  Conduction:     Conduction: abnormal      Abnormal conduction: 1st degree    ST segments:     ST segments:  Normal  Q waves:     Q waves:  V1 and V2             ED Course                               MDM  Number of Diagnoses or Management Options  Acute bronchitis: new and requires workup  Acute encephalopathy: new and requires workup  Nausea and vomiting: new and requires workup     Amount and/or Complexity of Data Reviewed  Clinical lab tests: ordered and reviewed  Tests in the radiology section of CPT®: ordered and reviewed  Tests in the medicine section of CPT®: ordered and reviewed  Decide to obtain previous medical records or to obtain history from someone other than the patient: yes  Obtain history from someone other than the patient: yes  Discuss the patient with other providers: yes  Independent visualization of images, tracings, or specimens: yes    Patient Progress  Patient progress: stable        Disposition  Final diagnoses:   Acute encephalopathy   Nausea and vomiting   Acute bronchitis Time reflects when diagnosis was documented in both MDM as applicable and the Disposition within this note     Time User Action Codes Description Comment    1/26/2020  1:08 PM Afia Weldon Add [G93 40] Acute encephalopathy     1/26/2020  1:08 PM Afia Weldon Add [R11 2] Nausea and vomiting     1/26/2020  1:08 PM Afia Weldon Add [J20 9] Acute bronchitis     1/30/2020  9:00 AM Srinath Wynn Add [I10] Essential hypertension       ED Disposition     ED Disposition Condition Date/Time Comment    Admit Stable Sun Jan 26, 2020  1:07 PM Case was discussed with Dr Franklin Wood and the patient's admission status was agreed to be Admission Status: inpatient status to the service of Dr Franklin Duncan         Discharge Medication List as of 1/30/2020 11:31 AM      CONTINUE these medications which have CHANGED    Details   gabapentin (NEURONTIN) 100 mg capsule Take 1 capsule (100 mg total) by mouth 2 (two) times a day, Starting Thu 1/30/2020, Normal      !! lisinopril (ZESTRIL) 20 mg tablet Take 1 tablet (20 mg total) by mouth daily at bedtime as needed (If systolic blood pressure more than 160), Starting Thu 1/30/2020, Normal       !! - Potential duplicate medications found  Please discuss with provider        CONTINUE these medications which have NOT CHANGED    Details   acetaminophen (TYLENOL) 325 mg tablet Take 2 tablets (650 mg total) by mouth every 6 (six) hours as needed for mild pain, headaches or fever, Starting Sat 6/8/2019, Normal      ALPRAZolam (XANAX) 0 25 mg tablet Take 1 tablet (0 25 mg total) by mouth 2 (two) times a day as needed for anxiety or sleep, Starting Mon 1/6/2020, Normal      amiodarone 200 mg tablet Take 0 5 tablets (100 mg total) by mouth daily, Starting Mon 4/29/2019, Normal      amLODIPine (NORVASC) 10 mg tablet Take 1 tablet (10 mg total) by mouth daily, Starting Tue 6/11/2019, Normal      aspirin 81 MG tablet Take 81 mg by mouth 2 (two) times a day, Starting 8/13/2014, Until Discontinued, Historical Med      cyclobenzaprine (FLEXERIL) 5 mg tablet Take 1 tablet (5 mg total) by mouth 3 (three) times a day as needed for muscle spasms, Starting Mon 1/6/2020, Normal      dorzolamide-timolol (COSOPT) 22 3-6 8 MG/ML ophthalmic solution Administer 1 drop into the left eye 2 (two) times a day, Historical Med      famotidine (PEPCID) 20 mg tablet Take 1 tablet (20 mg total) by mouth 2 (two) times a day, Starting Tue 6/11/2019, Normal      hydrALAZINE (APRESOLINE) 10 mg tablet Take 1 tablet (10 mg total) by mouth 2 (two) times a day as needed (For SBP>160 or DBP>90 consistently, start taking this medication ), Starting Tue 6/11/2019, Normal      HYDROcodone-acetaminophen (NORCO) 5-325 mg per tablet Take 1 tablet by mouth every 6 (six) hours as needed for painMax Daily Amount: 4 tablets, Starting Mon 1/6/2020, Normal      !! lisinopril (ZESTRIL) 40 mg tablet Take 1 tablet (40 mg total) by mouth daily, Starting Wed 9/11/2019, Normal      meclizine (ANTIVERT) 25 mg tablet Take 1 tablet (25 mg total) by mouth every 8 (eight) hours as needed for dizziness, Starting Mon 5/20/2019, Normal      Multiple Vitamins-Minerals (ICAPS AREDS 2 PO) Take 1 tablet by mouth 2 (two) times a day, Historical Med      Omega-3 Fatty Acids (FISH OIL) 1,000 mg Take 1,000 mg by mouth 2 (two) times a day, Until Discontinued, Historical Med      rOPINIRole (REQUIP) 2 mg tablet take 1 tablet by mouth at bedtime, Normal      traZODone (DESYREL) 50 mg tablet take 1 tablet by mouth at bedtime, Normal       !! - Potential duplicate medications found  Please discuss with provider        STOP taking these medications       CARAFATE 1 GM/10ML suspension Comments:   Reason for Stopping:         linaCLOtide 145 MCG CAPS Comments:   Reason for Stopping:         pantoprazole (PROTONIX) 40 mg tablet Comments:   Reason for Stopping:         senna-docusate sodium (SENOKOT S) 8 6-50 mg per tablet Comments:   Reason for Stopping:         sodium chloride 1 g tablet Comments:   Reason for Stopping:             No discharge procedures on file      ED Provider  Electronically Signed by           Melton Libman, DO  01/30/20 2922

## 2020-01-26 NOTE — ASSESSMENT & PLAN NOTE
Severe lumbar stenosis with continue opioid dependence  On Vicodin and gabapentin  Not a surgical candidate

## 2020-01-26 NOTE — PLAN OF CARE
Problem: Potential for Falls  Goal: Patient will remain free of falls  Description  INTERVENTIONS:  - Assess patient frequently for physical needs  -  Identify cognitive and physical deficits and behaviors that affect risk of falls    -  [place] fall precautions as indicated by assessment   - Educate patient/family on patient safety including physical limitations  - Instruct patient to call for assistance with activity based on assessment  - Modify environment to reduce risk of injury  - Consider OT/PT consult to assist with strengthening/mobility  Outcome: Progressing     Problem: NEUROSENSORY - ADULT  Goal: Achieves stable or improved neurological status  Description  INTERVENTIONS  - Monitor and report changes in neurological status  - Monitor vital signs such as temperature, blood pressure, glucose, and any other labs ordered   - Initiate measures to prevent increased intracranial pressure  - Monitor for seizure activity and implement precautions if appropriate      Outcome: Progressing     Problem: RESPIRATORY - ADULT  Goal: Achieves optimal ventilation and oxygenation  Description  INTERVENTIONS:  - Assess for changes in respiratory status  - Assess for changes in mentation and behavior  - Position to facilitate oxygenation and minimize respiratory effort  - Oxygen administered by appropriate delivery if ordered  - Initiate smoking cessation education as indicated  - Encourage broncho-pulmonary hygiene including cough, deep breathe, Incentive Spirometry  - Assess the need for suctioning and aspirate as needed  - Assess and instruct to report SOB or any respiratory difficulty  - Respiratory Therapy support as indicated  Outcome: Progressing

## 2020-01-27 LAB
ANION GAP SERPL CALCULATED.3IONS-SCNC: 12 MMOL/L (ref 4–13)
ATRIAL RATE: 79 BPM
BASOPHILS # BLD AUTO: 0.06 THOUSANDS/ΜL (ref 0–0.1)
BASOPHILS NFR BLD AUTO: 1 % (ref 0–1)
BUN SERPL-MCNC: 10 MG/DL (ref 5–25)
CALCIUM SERPL-MCNC: 8.6 MG/DL (ref 8.3–10.1)
CHLORIDE SERPL-SCNC: 105 MMOL/L (ref 100–108)
CO2 SERPL-SCNC: 22 MMOL/L (ref 21–32)
CREAT SERPL-MCNC: 0.92 MG/DL (ref 0.6–1.3)
EOSINOPHIL # BLD AUTO: 0.01 THOUSAND/ΜL (ref 0–0.61)
EOSINOPHIL NFR BLD AUTO: 0 % (ref 0–6)
ERYTHROCYTE [DISTWIDTH] IN BLOOD BY AUTOMATED COUNT: 13.9 % (ref 11.6–15.1)
GFR SERPL CREATININE-BSD FRML MDRD: 54 ML/MIN/1.73SQ M
GLUCOSE SERPL-MCNC: 99 MG/DL (ref 65–140)
HCT VFR BLD AUTO: 39 % (ref 34.8–46.1)
HGB BLD-MCNC: 12.7 G/DL (ref 11.5–15.4)
IMM GRANULOCYTES # BLD AUTO: 0.02 THOUSAND/UL (ref 0–0.2)
IMM GRANULOCYTES NFR BLD AUTO: 0 % (ref 0–2)
L PNEUMO1 AG UR QL IA.RAPID: NEGATIVE
LYMPHOCYTES # BLD AUTO: 2.41 THOUSANDS/ΜL (ref 0.6–4.47)
LYMPHOCYTES NFR BLD AUTO: 39 % (ref 14–44)
MCH RBC QN AUTO: 32.2 PG (ref 26.8–34.3)
MCHC RBC AUTO-ENTMCNC: 32.6 G/DL (ref 31.4–37.4)
MCV RBC AUTO: 99 FL (ref 82–98)
MONOCYTES # BLD AUTO: 0.34 THOUSAND/ΜL (ref 0.17–1.22)
MONOCYTES NFR BLD AUTO: 6 % (ref 4–12)
NEUTROPHILS # BLD AUTO: 3.36 THOUSANDS/ΜL (ref 1.85–7.62)
NEUTS SEG NFR BLD AUTO: 54 % (ref 43–75)
NRBC BLD AUTO-RTO: 0 /100 WBCS
P AXIS: 42 DEGREES
PLATELET # BLD AUTO: 223 THOUSANDS/UL (ref 149–390)
PMV BLD AUTO: 9.5 FL (ref 8.9–12.7)
POTASSIUM SERPL-SCNC: 4.1 MMOL/L (ref 3.5–5.3)
PR INTERVAL: 316 MS
PROCALCITONIN SERPL-MCNC: <0.05 NG/ML
QRS AXIS: -52 DEGREES
QRSD INTERVAL: 88 MS
QT INTERVAL: 360 MS
QTC INTERVAL: 412 MS
RBC # BLD AUTO: 3.94 MILLION/UL (ref 3.81–5.12)
S PNEUM AG UR QL: NEGATIVE
SODIUM SERPL-SCNC: 139 MMOL/L (ref 136–145)
T WAVE AXIS: 97 DEGREES
VENTRICULAR RATE: 79 BPM
WBC # BLD AUTO: 6.2 THOUSAND/UL (ref 4.31–10.16)

## 2020-01-27 PROCEDURE — 84484 ASSAY OF TROPONIN QUANT: CPT | Performed by: NURSE PRACTITIONER

## 2020-01-27 PROCEDURE — 97167 OT EVAL HIGH COMPLEX 60 MIN: CPT

## 2020-01-27 PROCEDURE — 99232 SBSQ HOSP IP/OBS MODERATE 35: CPT | Performed by: HOSPITALIST

## 2020-01-27 PROCEDURE — 93010 ELECTROCARDIOGRAM REPORT: CPT | Performed by: INTERNAL MEDICINE

## 2020-01-27 PROCEDURE — 84145 PROCALCITONIN (PCT): CPT | Performed by: HOSPITALIST

## 2020-01-27 PROCEDURE — 93005 ELECTROCARDIOGRAM TRACING: CPT

## 2020-01-27 PROCEDURE — 80048 BASIC METABOLIC PNL TOTAL CA: CPT | Performed by: HOSPITALIST

## 2020-01-27 PROCEDURE — 85025 COMPLETE CBC W/AUTO DIFF WBC: CPT | Performed by: HOSPITALIST

## 2020-01-27 PROCEDURE — 97163 PT EVAL HIGH COMPLEX 45 MIN: CPT

## 2020-01-27 RX ORDER — LISINOPRIL 20 MG/1
40 TABLET ORAL
Status: DISCONTINUED | OUTPATIENT
Start: 2020-01-28 | End: 2020-01-30 | Stop reason: HOSPADM

## 2020-01-27 RX ORDER — POLYETHYLENE GLYCOL 3350 17 G/17G
17 POWDER, FOR SOLUTION ORAL DAILY PRN
Status: DISCONTINUED | OUTPATIENT
Start: 2020-01-27 | End: 2020-01-30 | Stop reason: HOSPADM

## 2020-01-27 RX ORDER — GABAPENTIN 100 MG/1
100 CAPSULE ORAL 3 TIMES DAILY
Status: DISCONTINUED | OUTPATIENT
Start: 2020-01-27 | End: 2020-01-29

## 2020-01-27 RX ORDER — LISINOPRIL 20 MG/1
20 TABLET ORAL
Status: DISCONTINUED | OUTPATIENT
Start: 2020-01-27 | End: 2020-01-30 | Stop reason: HOSPADM

## 2020-01-27 RX ADMIN — AMIODARONE HYDROCHLORIDE 100 MG: 200 TABLET ORAL at 08:02

## 2020-01-27 RX ADMIN — FAMOTIDINE 20 MG: 20 TABLET ORAL at 08:02

## 2020-01-27 RX ADMIN — LUBIPROSTONE 24 MCG: 24 CAPSULE, GELATIN COATED ORAL at 18:08

## 2020-01-27 RX ADMIN — ONDANSETRON 4 MG: 2 INJECTION INTRAMUSCULAR; INTRAVENOUS at 08:15

## 2020-01-27 RX ADMIN — TRAZODONE HYDROCHLORIDE 50 MG: 50 TABLET ORAL at 22:04

## 2020-01-27 RX ADMIN — AMLODIPINE BESYLATE 10 MG: 10 TABLET ORAL at 08:02

## 2020-01-27 RX ADMIN — LISINOPRIL 20 MG: 20 TABLET ORAL at 08:02

## 2020-01-27 RX ADMIN — AZITHROMYCIN MONOHYDRATE 500 MG: 500 INJECTION, POWDER, LYOPHILIZED, FOR SOLUTION INTRAVENOUS at 16:29

## 2020-01-27 RX ADMIN — SODIUM CHLORIDE 75 ML/HR: 0.9 INJECTION, SOLUTION INTRAVENOUS at 06:02

## 2020-01-27 RX ADMIN — GABAPENTIN 100 MG: 100 CAPSULE ORAL at 20:47

## 2020-01-27 RX ADMIN — DORZOLAMIDE HYDROCHLORIDE AND TIMOLOL MALEATE 1 DROP: 20; 5 SOLUTION/ DROPS OPHTHALMIC at 18:08

## 2020-01-27 RX ADMIN — ALPRAZOLAM 0.25 MG: 0.25 TABLET ORAL at 16:46

## 2020-01-27 RX ADMIN — GABAPENTIN 200 MG: 100 CAPSULE ORAL at 08:02

## 2020-01-27 RX ADMIN — DORZOLAMIDE HYDROCHLORIDE AND TIMOLOL MALEATE 1 DROP: 20; 5 SOLUTION/ DROPS OPHTHALMIC at 08:04

## 2020-01-27 RX ADMIN — ACETAMINOPHEN 650 MG: 325 TABLET, FILM COATED ORAL at 08:15

## 2020-01-27 RX ADMIN — ROPINIROLE HYDROCHLORIDE 2 MG: 1 TABLET, FILM COATED ORAL at 22:04

## 2020-01-27 RX ADMIN — LUBIPROSTONE 24 MCG: 24 CAPSULE, GELATIN COATED ORAL at 08:04

## 2020-01-27 RX ADMIN — ASPIRIN 81 MG 81 MG: 81 TABLET ORAL at 08:02

## 2020-01-27 RX ADMIN — SODIUM CHLORIDE 75 ML/HR: 0.9 INJECTION, SOLUTION INTRAVENOUS at 18:07

## 2020-01-27 RX ADMIN — ALPRAZOLAM 0.25 MG: 0.25 TABLET ORAL at 23:00

## 2020-01-27 RX ADMIN — ASPIRIN 81 MG 81 MG: 81 TABLET ORAL at 18:07

## 2020-01-27 NOTE — ASSESSMENT & PLAN NOTE
Patient with chronic pain secondary to severe peripheral neuropathy  She is on Vicodin  Recently her gabapentin dose was increased by her PCP  Daughter states that the increased to 200 mg t i d  From 100 mg t i d  Has not really helped her pain and that she became confused a few days after increasing the dose  She requests that the dose be decreased back to 100 mg t i d

## 2020-01-27 NOTE — ASSESSMENT & PLAN NOTE
Patient admitted with confusion  Normally patient is very sharp in her mentation  The started about 3-4 days ago with URI like symptoms followed by confusion and vomiting  Constellation of symptoms suggestive of Legionella with predominantly extrapulmonary symptoms like confusion, GI symptoms versus a viral bronchitis  Even if Legionella negative would be prudent to continue macrolides to complete a 5 day course  Will continue IV hydration as well as start macrolide  (Levaquin would not be a good choice this elderly patient)  Mentation has improved today although she is intermittently confused

## 2020-01-27 NOTE — PLAN OF CARE
Problem: Potential for Falls  Goal: Patient will remain free of falls  Description  INTERVENTIONS:  - Assess patient frequently for physical needs  -  Identify cognitive and physical deficits and behaviors that affect risk of falls    -  [place] fall precautions as indicated by assessment   - Educate patient/family on patient safety including physical limitations  - Instruct patient to call for assistance with activity based on assessment  - Modify environment to reduce risk of injury  - Consider OT/PT consult to assist with strengthening/mobility  Outcome: Progressing     Problem: NEUROSENSORY - ADULT  Goal: Achieves stable or improved neurological status  Description  INTERVENTIONS  - Monitor and report changes in neurological status  - Monitor vital signs such as temperature, blood pressure, glucose, and any other labs ordered   - Initiate measures to prevent increased intracranial pressure  - Monitor for seizure activity and implement precautions if appropriate      Outcome: Progressing     Problem: RESPIRATORY - ADULT  Goal: Achieves optimal ventilation and oxygenation  Description  INTERVENTIONS:  - Assess for changes in respiratory status  - Assess for changes in mentation and behavior  - Position to facilitate oxygenation and minimize respiratory effort  - Oxygen administered by appropriate delivery if ordered  - Initiate smoking cessation education as indicated  - Encourage broncho-pulmonary hygiene including cough, deep breathe, Incentive Spirometry  - Assess the need for suctioning and aspirate as needed  - Assess and instruct to report SOB or any respiratory difficulty  - Respiratory Therapy support as indicated  Outcome: Progressing     Problem: Prexisting or High Potential for Compromised Skin Integrity  Goal: Skin integrity is maintained or improved  Description  INTERVENTIONS:  - Identify patients at risk for skin breakdown  - Assess and monitor skin integrity  - Assess and monitor nutrition and hydration status  - Monitor labs   - Assess for incontinence   - Turn and reposition patient  - Assist with mobility/ambulation  - Relieve pressure over bony prominences  - Avoid friction and shearing  - Provide appropriate hygiene as needed including keeping skin clean and dry  - Evaluate need for skin moisturizer/barrier cream  - Collaborate with interdisciplinary team   - Patient/family teaching  - Consider wound care consult   Outcome: Progressing

## 2020-01-27 NOTE — PROGRESS NOTES
Progress Note Rommel Townsend 9/20/1926, 80 y o  female MRN: [id number]    Unit/Bed#: S -01 Encounter: [id number]    Primary Care Provider: Silvio Patel DO   Date and time admitted to hospital: 1/26/2020  8:25 AM        Acute metabolic encephalopathy  Assessment & Plan  Patient admitted with confusion  Normally patient is very sharp in her mentation  The started about 3-4 days ago with URI like symptoms followed by confusion and vomiting  Need to rule out Legionella given symptoms of  confusion, GI symptoms versus a viral bronchitis  Even if Legionella negative would be prudent to continue macrolides to complete a 5 day course  Will continue IV hydration as well as start macrolide  (Levaquin would not be a good choice this elderly patient)  Mentation has improved today although she is intermittently confused  No neck stiffness  * Pneumonia due to infectious organism  Assessment & Plan  Patient presented with URI like symptoms and chest congestion with cough with mucoid expectoration 4 days ago  No fever  Then developed  confusion and vomiting  Need to rule out Legionella given that she is elderly, symptoms started with pulmonary symptoms and now has confusion and vomiting  CT reveals- Mosaic pattern of attenuation in both lungs, most likely due to chronic small airway disease  Scattered areas of subsegmental atelectasis  No mass or consolidation  Will treat her with macrolides till Legionella results back  Sent blood cultures urine cultures and Legionella  Continue IV hydration  Influenza and RSV are negative  It is also likely that she could have a viral bronchitis but would continue with macrolides till Legionella levels are back  Chronic pain  Assessment & Plan  Patient with chronic pain secondary to severe peripheral neuropathy  She is on Vicodin  Recently her gabapentin dose was increased by her PCP  Daughter states that the increased to 200 mg t i d  From 100 mg t i d   Has not really helped her pain and that she became confused a few days after increasing the dose  She requests that the dose be decreased back to 100 mg t i d     Constipation due to pain medication  Assessment & Plan  Will keep her on a stool softener  On Linaclotide    Essential hypertension  Assessment & Plan  Blood pressure has been labile for her  She is currently on amlodipine 10 mg daily, hydralazine 10 mg b i d  PRN, lisinopril 40 mg in the morning and an additional 20 mg p r n  at night if systolic blood pressure more than 160  Lumbar stenosis with neurogenic claudication  Assessment & Plan  Severe lumbar stenosis with continue opioid dependence  On Vicodin and gabapentin  Not a surgical candidate  Peripheral neuropathy  Assessment & Plan  Severe peripheral neuropathy with continuous opioid dependence  Continue Vicodin and gabapentin  Continuous opioid dependence [facility])  Assessment & Plan  Please see notes above about continual opioid dependence  Paroxysmal atrial fibrillation (HCC)  Assessment & Plan  Currently in sinus rhythm  On amiodarone  Subjective:  Patient more awake and verbal today  No fever or leukocytosis  No further episodes of vomiting since admission  Likely this was a viral bronchitis with viral GI symptoms  However will treat with macrolides for now to treat for Legionella till results come back  Physical Exam:   Vitals: Blood pressure 146/70, pulse 74, temperature 98 3 °F (36 8 °C), temperature source Oral, resp  rate 16, weight 65 kg (143 lb 4 8 oz), SpO2 96 %  ,Body mass index is 32 13 kg/m²  Gen:  Pleasant, non-tachypnic, non-dyspnic  Conversant  Heart: regular rate and rhythm, S1S2 present, no murmur, rub or gallop  Lungs: clear to ausculatation bilaterally  No wheezing, crackless, or rhonchi  No accessory muscle use or respiratory distress  Abd: soft, non-tender, non-distended  NABS, no guarding, rebound or peritoneal signs    Extremities: no clubbing, cyanosis or edema  2+pedal pulses bilaterally  Full range of motion  Neuro: awake, alert and oriented  Cranial nerves 2-12 intact  Strength and sensation grossly intact  Skin: warm and dry: no petechiae, purpura and rash      LABS:   Results from last 7 days   Lab Units 01/27/20  0435 01/26/20  0859   WBC Thousand/uL 6 20 4 74   HEMOGLOBIN g/dL 12 7 13 3   HEMATOCRIT % 39 0 40 0   PLATELETS Thousands/uL 223 208     Results from last 7 days   Lab Units 01/27/20  0435 01/26/20  0859   POTASSIUM mmol/L 4 1 4 5   CHLORIDE mmol/L 105 108   CO2 mmol/L 22 26   BUN mg/dL 10 15   CREATININE mg/dL 0 92 1 11   CALCIUM mg/dL 8 6 8 7       Intake/Output Summary (Last 24 hours) at 1/27/2020 4009  Last data filed at 1/27/2020 6594  Gross per 24 hour   Intake 1097 08 ml   Output 1900 ml   Net -802 92 ml           Current Facility-Administered Medications:  acetaminophen 650 mg Oral Q6H PRN Shravan Suarez MD    ALPRAZolam 0 25 mg Oral BID PRN Shravan Suarez MD    amiodarone 100 mg Oral Daily Aris Lara MD    amLODIPine 10 mg Oral Daily Shravan Suarez MD    aspirin 81 mg Oral BID Shravan Suarez MD    cefTRIAXone 1,000 mg Intravenous Q24H Shravan Suarez MD Last Rate: 1,000 mg (01/26/20 1702)   And        azithromycin 500 mg Intravenous Q24H Aris Lara MD Last Rate: 500 mg (01/26/20 1830)   cyclobenzaprine 5 mg Oral TID PRN Shravan Suarez MD    dorzolamide-timolol 1 drop Left Eye BID Shravan Suarez MD    enoxaparin 30 mg Subcutaneous Daily Shravan Suarez MD    famotidine 20 mg Oral Daily Shravan Suarez MD    gabapentin 100 mg Oral TID Shravan Suarez MD    guaiFENesin 200 mg Oral Q4H PRN Shravan Suarez MD    hydrALAZINE 10 mg Oral BID PRN Shravan Suarez MD    HYDROcodone-acetaminophen 1 tablet Oral Q6H PRN Aris Lara MD    levalbuterol 0 63 mg Nebulization Q8H PRN Shravan Suarez MD    lisinopril 20 mg Oral HS BRYAN Suarez MD    [START ON 1/28/2020] lisinopril 40 mg Oral After Breakfast Doc Bhatia MD    lubiprostone 24 mcg Oral BID With Meals Aris Lara MD    ondansetron 4 mg Intravenous Q6H PRN Aris Lara MD    polyethylene glycol 17 g Oral Daily PRN Doc Bhatia MD    rOPINIRole 2 mg Oral HS Doc Bhatia MD    sodium chloride 75 mL/hr Intravenous Continuous Doc Bhatia MD Last Rate: 75 mL/hr (01/27/20 0602)   traZODone 50 mg Oral HS Doc Bhatia MD        Family update- updated daughter at length

## 2020-01-27 NOTE — PLAN OF CARE
Problem: OCCUPATIONAL THERAPY ADULT  Goal: Performs self-care activities at highest level of function for planned discharge setting  See evaluation for individualized goals  Description  Treatment Interventions: ADL retraining, Functional transfer training, Cognitive reorientation, Endurance training, Patient/family training, Compensatory technique education, Energy conservation, Activityengagement          See flowsheet documentation for full assessment, interventions and recommendations  Note:   Limitation: Decreased ADL status, Decreased endurance, Decreased self-care trans, Decreased high-level ADLs     Assessment: Pt is a 80 y o  female seen for OT evaluation s/p admit to [address] [place] [address] on 1/26/2020 w/ Pneumonia due to infectious organism  Pt was brought in to the ED by her daughter, with c/o cough and chest congestion, nausea/vomiting, near fall and confusion x 4 days  Per daughter, pt is mentally very sharp and oriented  Comorbidities affecting pt's functional performance at time of assessment include: chronic pain, lumbar stenosis with neurogenic claudication, afib, HTN, acute metabolic encephalopathy, pain R shoulder,  Personal factors affecting pt at time of IE include:difficulty performing IADLS  and limited insight into deficits  Prior to admission, pt was living w her daughter in a one story home  She was mostly able to care for self with assist from daughter, min assist w self care, mobility, per chart, pt w hx of being fearful of falling  Upon evaluation: Pt demonstrates the ability to complete LB dressing w S, bed mobility w S, min assist for transfers  She does present with SOB, general weakness w minimal activity, she is impulsive and unsafe w transferring by herself  Pt to benefit from continued skilled OT tx while in the hospital to address deficits as defined above and maximize level of functional independence w ADL's and functional mobility   Occupational Performance areas to address include: grooming, toilet hygiene, functional mobility and clothing management  Pt scored  45/100 on the barthel index  Based on findings from OT evaluation and functional performance deficits, pt has been identified as a High  complexity evaluation  From OT standpoint, recommendation at time of d/c would be home w family support pending PLOF/home setup and available assist at home  It may appear that patient is currently functioning close to her baseline  OT Discharge Recommendation: Home with family support(pending progress, home set up and available S/Assist)  OT - OK to Discharge:  Yes

## 2020-01-27 NOTE — OCCUPATIONAL THERAPY NOTE
Occupational Therapy Evaluation      Meryl Perez    1/27/2020    Principal Problem:    Pneumonia due to infectious organism  Active Problems:    Chronic pain    Lumbar stenosis with neurogenic claudication    Peripheral neuropathy    Constipation due to pain medication    Paroxysmal atrial fibrillation (HCC)    Continuous opioid dependence ([address] )    Essential hypertension    Acute metabolic encephalopathy      Past Medical History:   Diagnosis Date    Chest pain     last assessed-4/15/2013    Chronic pain     back    Gait disturbance     last assessed-2/12/2014    Hypertension     Hyponatremia     Hypoxemia     last assessed-1/5/2015    Melena     last assessed-2/23/2017    Rotator cuff injury     Wheezing     last assessed-1/5/2015       Past Surgical History:   Procedure Laterality Date    APPENDECTOMY      BACK SURGERY      EYE SURGERY          01/27/20 1350   Note Type   Note type Eval only   Restrictions/Precautions   Weight Bearing Precautions Per Order No   Other Precautions Cognitive; Bed Alarm;Multiple lines; Fall Risk   Pain Assessment   Pain Assessment No/denies pain   Pain Score No Pain   Home Living   Type of [facility] One level   Home Equipment   (rollator)   Additional Comments per chart, pt lives at home w her daughter in a one story home with 0 MASSIEL  Prior Function   Level of [place] Needs assistance with ADLs and functional mobility  (fearful of falling)   Lives With Daughter   Receives Help From Family   ADL Assistance Needs assistance   IADLs Needs assistance   Falls in the last 6 months   (1 near fall a few days before admit to acute care)   Lifestyle   Autonomy per chart, pt needed some assist w self care and mobility at baseline    difficulty getting complete home set up/PLOF from patient due to language barrier   Reciprocal Relationships supportive daughter   Psychosocial   Psychosocial (WDL) WDL   Subjective   Subjective Pt pleasant and cooperative   ADL Eating Assistance 7  Independent   Eating Deficit Setup   Grooming Assistance 5  Supervision/Setup   Grooming Deficit Increased time to complete   UB Bathing Assistance 5  Supervision/Setup   LB Bathing Assistance 4  Minimal Assistance   LB Bathing Deficit Increased time to complete  (for thoroughness)   LB Dressing Assistance 5  Supervision/Setup   Bed Mobility   Supine to Sit 5  Supervision   Sit to Supine 5  Supervision   Additional Comments supervision for safety  Pt is impulsive   Transfers   Sit to Stand 5  Supervision   Stand to Sit 5  Supervision   Stand pivot 4  Minimal assistance   Balance   Static Sitting Good   Dynamic Sitting Fair +   Static Standing Fair   Dynamic Standing Fair -   Activity Tolerance   Activity Tolerance Patient limited by fatigue   Nurse Made Aware appropriate to see per SHIN Garcias   RUE Assessment   RUE Assessment   (75* shoulder flex  pt reports old rotator cuff tear)   LUE Assessment   LUE Assessment WFL   Hand Function   Gross Motor Coordination Functional   Fine Motor Coordination Functional   Sensation   Additional Comments pt c/o numness/tingling feeling R hand/arm   Vision - Complex Assessment   Tracking Able to track stimulus in all quads without difficulty   Perception   Inattention/Neglect Appears intact   Cognition   Overall Cognitive Status Impaired   Arousal/Participation Alert; Cooperative   Attention Attends with cues to redirect   Orientation Level Oriented to person;Oriented to place;Oriented to time;Disoriented to situation   Memory Decreased recall of precautions;Decreased recall of recent events   Following Commands Follows one step commands with increased time or repetition   Assessment   Limitation Decreased ADL status; Decreased endurance;Decreased self-care trans;Decreased high-level ADLs   Assessment Pt is a 80 y o  female seen for OT evaluation s/p admit to [address] [place] [address] on 1/26/2020 w/ Pneumonia due to infectious organism   Pt was brought in to the ED by her daughter, with c/o cough and chest congestion, nausea/vomiting, near fall and confusion x 4 days  Per daughter, pt is mentally very sharp and oriented  Comorbidities affecting pt's functional performance at time of assessment include: chronic pain, lumbar stenosis with neurogenic claudication, afib, HTN, acute metabolic encephalopathy, pain R shoulder,  Personal factors affecting pt at time of IE include:difficulty performing IADLS  and limited insight into deficits  Prior to admission, pt was living w her daughter in a one story home  She was mostly able to care for self with assist from daughter, min assist w self care, mobility, per chart, pt w hx of being fearful of falling  Upon evaluation: Pt demonstrates the ability to complete LB dressing w S, bed mobility w S, min assist for transfers  She does present with SOB, general weakness w minimal activity, she is impulsive and unsafe w transferring by herself  Pt to benefit from continued skilled OT tx while in the hospital to address deficits as defined above and maximize level of functional independence w ADL's and functional mobility  Occupational Performance areas to address include: grooming, toilet hygiene, functional mobility and clothing management  Pt scored  45/100 on the barthel index  Based on findings from OT evaluation and functional performance deficits, pt has been identified as a High  complexity evaluation  From OT standpoint, recommendation at time of d/c would be home w family support pending PLOF/home setup and available assist at home  It may appear that patient is currently functioning close to her baseline      Goals   Patient Goals to get back in to bed   STG Time Frame 3-5   Short Term Goal #1 pt will tolerate sitting at EOB x 15 min w good balance for completion of simple self care   Short Term Goal #2 demonstrate good safety awareness w self care/functional mobility   Short Term Goal  pt will demonstrate good ECT w all self care/mobility   LTG Time Frame 7-10   Long Term Goal #1 tolerate standing for functional activity x 5 min w fair + balance and support of most appropriate DME for increased safety w hygiene   Long Term Goal #2 toileting including hygiene w S/set up/cues/good safety    Long Term Goal assess for additional DME needs at home, assist w safe dc planning   Plan   Treatment Interventions ADL retraining;Functional transfer training;Cognitive reorientation; Endurance training;Patient/family training; Compensatory technique education; Energy conservation; Activityengagement   Goal Expiration Date 02/06/20   OT Frequency 2-3x/wk   Recommendation   OT Discharge Recommendation Home with family support  (pending progress, home set up and available S/Assist)   OT - OK to Discharge Yes   Barthel Index   Feeding 5   Bathing 0   Grooming Score 5   Dressing Score 5   Bladder Score 5   Bowels Score 10   Toilet Use Score 5   Transfers (Bed/Chair) Score 10   Mobility (Level Surface) Score 0   Stairs Score 0   Barthel Index Score 45

## 2020-01-27 NOTE — ASSESSMENT & PLAN NOTE
Blood pressure has been labile for her  She is currently on amlodipine 10 mg daily, hydralazine 10 mg b i d  PRN, lisinopril 40 mg in the morning and an additional 20 mg p r n  at night if systolic blood pressure more than 160

## 2020-01-27 NOTE — PLAN OF CARE
Problem: Potential for Falls  Goal: Patient will remain free of falls  Description  INTERVENTIONS:  - Assess patient frequently for physical needs  -  Identify cognitive and physical deficits and behaviors that affect risk of falls    -  [place] fall precautions as indicated by assessment   - Educate patient/family on patient safety including physical limitations  - Instruct patient to call for assistance with activity based on assessment  - Modify environment to reduce risk of injury  - Consider OT/PT consult to assist with strengthening/mobility  Outcome: Progressing     Problem: NEUROSENSORY - ADULT  Goal: Achieves stable or improved neurological status  Description  INTERVENTIONS  - Monitor and report changes in neurological status  - Monitor vital signs such as temperature, blood pressure, glucose, and any other labs ordered   - Initiate measures to prevent increased intracranial pressure  - Monitor for seizure activity and implement precautions if appropriate      Outcome: Progressing     Problem: RESPIRATORY - ADULT  Goal: Achieves optimal ventilation and oxygenation  Description  INTERVENTIONS:  - Assess for changes in respiratory status  - Assess for changes in mentation and behavior  - Position to facilitate oxygenation and minimize respiratory effort  - Oxygen administered by appropriate delivery if ordered  - Initiate smoking cessation education as indicated  - Encourage broncho-pulmonary hygiene including cough, deep breathe, Incentive Spirometry  - Assess the need for suctioning and aspirate as needed  - Assess and instruct to report SOB or any respiratory difficulty  - Respiratory Therapy support as indicated  Outcome: Progressing     Problem: Prexisting or High Potential for Compromised Skin Integrity  Goal: Skin integrity is maintained or improved  Description  INTERVENTIONS:  - Identify patients at risk for skin breakdown  - Assess and monitor skin integrity  - Assess and monitor nutrition and hydration status  - Monitor labs   - Assess for incontinence   - Turn and reposition patient  - Assist with mobility/ambulation  - Relieve pressure over bony prominences  - Avoid friction and shearing  - Provide appropriate hygiene as needed including keeping skin clean and dry  - Evaluate need for skin moisturizer/barrier cream  - Collaborate with interdisciplinary team   - Patient/family teaching  - Consider wound care consult   Outcome: Progressing

## 2020-01-27 NOTE — PLAN OF CARE
Problem: PHYSICAL THERAPY ADULT  Goal: Performs mobility at highest level of function for planned discharge setting  See evaluation for individualized goals  Description  Treatment/Interventions: Functional transfer training, LE strengthening/ROM, Therapeutic exercise, Endurance training, Cognitive reorientation, Patient/family training, Equipment eval/education, Bed mobility, Gait training  Equipment Recommended: [place]       See flowsheet documentation for full assessment, interventions and recommendations  Note:   Prognosis: Fair  Problem List: Decreased strength, Decreased endurance, Impaired balance, Decreased mobility, Decreased cognition, Decreased safety awareness  Assessment: Ritesh Silver is a 80 (also doc'd as 89 per [place] error) y/o Female who [facility] from home w/ c/o (+) cough, (+) wheezing, (+) vomiting, (+) confusion, with a diagnosis of pneumonia due to infectious organism  Received orders for PT eval and treat, with activity order(s) of up and OOB as tolerated and fall, aspiration precautions  This patient presents w/ comorbidities of LBP, RTC injury, spinal stenosis, HTN, peripheral neuropathy, Afib, dementia  and has personal factors of advanced age, mobilizing w/ assistive device, inability to navigate community distances, decreased cognition, preferred language not [place] (language barrier), inability to perform IADLs and inability to perform ADLs  Patient presents with the following impairments at time of evaluation including: weakness, decreased endurance, impaired cognition, impaired balance, gait deviations, visual impairment, decreased safety awareness and fall risk   These impairments are evident in findings from the physical examination (weakness), mobility assessment (need for min to supervision assist w/ all phases of mobility when usually mobilizing independently, tolerance to only 5 feet of ambulation, need for cueing for mobility technique and need for cueing for safety awareness), and objective measure(s) Barthel Index: 45/100  During session, pt needed input for task input and mobility technique/safety  Due to physical, safety awareness and cognition deficits, patient is at an increased risk for falls  This patient's clinical presentation is unstable/unpredictable, which is evident in need for assist w/ all phases of mobility when usually mobilizing independently, tolerance to only 5 feet of ambulation and need for input for task focus and mobility technique  At this time patient would benefit from skilled inpatient PT in order to address abovementioned deficits in order to improve overall function and mobility  Discharge recommendation is for home w/ family support and home PT in order to reduce fall risk and maximize level of functional independence  Recommendation: Home with family support, Home PT          See flowsheet documentation for full assessment

## 2020-01-27 NOTE — ASSESSMENT & PLAN NOTE
Patient presented with URI like symptoms and chest congestion with cough with mucoid expectoration 4 days ago  No fever  Then developed  confusion and vomiting  Need to rule out Legionella given that she is elderly, symptoms started with pulmonary symptoms and now has confusion and vomiting  Will treat her with macrolides till Legionella results back  Sent blood cultures urine cultures and Legionella  Continue IV hydration  Influenza and RSV are negative  It is also likely that she could have a viral bronchitis but would continue with macrolides till Legionella levels are back

## 2020-01-27 NOTE — PHYSICAL THERAPY NOTE
PHYSICAL THERAPY EVALUATION NOTE    Patient Name: Fortino Mcdonald  [id number] Date: 1/27/2020     AGE:   80 y o  Mrn:   [id number]  ADMIT DX:  Acute bronchitis [J20 9]  Vomiting [R11 10]  Nausea and vomiting [R11 2]  Acute encephalopathy [G93 40]    Past Medical History:   Diagnosis Date    Chest pain     last assessed-4/15/2013    Chronic pain     back    Gait disturbance     last assessed-2/12/2014    Hypertension     Hyponatremia     Hypoxemia     last assessed-1/5/2015    Melena     last assessed-2/23/2017    Rotator cuff injury     Wheezing     last assessed-1/5/2015     Length Of Stay: 1  PHYSICAL THERAPY EVALUATION :        01/27/20 1352   Note Type   Note type Eval only   Pain Assessment   Pain Assessment No/denies pain   Pain Score No Pain   Home Living   Type of [address] One level   Home Equipment Other (Comment)  (rollator)   Prior Function   Level of [place] Needs assistance with ADLs and functional mobility; Needs assistance with IADLs   Lives With Daughter   Receives Help From Family   ADL Assistance Needs assistance   IADLs Needs assistance   Falls in the last 6 months   (1 recent near fall PTA)   Comments Pt is primarily polish speaking  She lives in a 1  w/ 0 [facility] w/ her daughter, Social history obtained via chart review  Pt uses a rollator to ambulate, recently needs physical A with ambulation in the last year  Restrictions/Precautions   Weight Bearing Precautions Per Order No   Other Precautions Cognitive; Chair Alarm; Bed Alarm;Multiple lines; Fall Risk   General   Family/Caregiver Present No   Cognition   Overall Cognitive Status Impaired   Arousal/Participation Cooperative   Orientation Level Oriented to person;Oriented to place;Oriented to time   Following Commands Follows one step commands with increased time or repetition   Comments Pt is supine in bed upon arrival  She is pleasant and agreeable to PT intervention   RUE Assessment   RUE Assessment   (hx of R rotator cuff tear)   RLE Assessment   RLE Assessment WFL   Strength RLE   RLE Overall Strength 3+/5   LLE Assessment   LLE Assessment WFL   Strength LLE   LLE Overall Strength 3+/5   Bed Mobility   Supine to Sit 5  Supervision   Additional items Assist x 1; Impulsive   Sit to Supine 5  Supervision   Additional items Assist x 1; Impulsive   Transfers   Sit to Stand 5  Supervision   Additional items Assist x 1; Impulsive   Stand to Sit 5  Supervision   Additional items Assist x 1; Impulsive   Ambulation/Elevation   Gait pattern Narrow KENIA; Decreased foot clearance; Short stride; Step to;Excessively slow   Gait Assistance 4  Minimal assist   Additional items Assist x 1   Assistive Device Rolling walker   Distance 5 ft   Balance   Static Sitting Good   Dynamic Sitting Fair +   Static Standing Fair   Dynamic Standing Fair -   Ambulatory Poor +  (w/ RW)   Activity Tolerance   Activity Tolerance Patient limited by fatigue; Other (Comment)  (Treatment limited secondary to ? cognition)   Medical Staff Made Aware Spoke to [facility] to SHIN [place], states pt is appropriate to see   Assessment   Prognosis Fair   Problem List Decreased strength;Decreased endurance; Impaired balance;Decreased mobility; Decreased cognition;Decreased safety awareness   Assessment Amrit Hale is a 80 (also doc'd as 89 per [place] error) y/o Female who [facility] 1/26[facility]2020 from home w/ c/o (+) cough, (+) wheezing, (+) vomiting, (+) confusion, with a diagnosis of pneumonia due to infectious organism  Received orders for PT eval and treat, with activity order(s) of up and OOB as tolerated and fall, aspiration precautions   This patient presents w/ comorbidities of LBP, RTC injury, spinal stenosis, HTN, peripheral neuropathy, Afib, dementia  and has personal factors of advanced age, mobilizing w/ assistive device, inability to navigate community distances, decreased cognition, preferred language not [place] (language barrier), inability to perform IADLs and inability to perform ADLs  Patient presents with the following impairments at time of evaluation including: weakness, decreased endurance, impaired cognition, impaired balance, gait deviations, visual impairment, decreased safety awareness and fall risk  These impairments are evident in findings from the physical examination (weakness), mobility assessment (need for min to supervision assist w/ all phases of mobility when usually mobilizing independently, tolerance to only 5 feet of ambulation, need for cueing for mobility technique and need for cueing for safety awareness), and objective measure(s) Barthel Index: 45/100  During session, pt needed input for task input and mobility technique/safety  Due to physical, safety awareness and cognition deficits, patient is at an increased risk for falls  This patient's clinical presentation is unstable/unpredictable, which is evident in need for assist w/ all phases of mobility when usually mobilizing independently, tolerance to only 5 feet of ambulation and need for input for task focus and mobility technique  At this time patient would benefit from skilled inpatient PT in order to address abovementioned deficits in order to improve overall function and mobility  Discharge recommendation is for home w/ family support and home PT in order to reduce fall risk and maximize level of functional independence  Goals   Patient Goals back to bed   STG Expiration Date 02/06/20   Short Term Goal #1 Patient will:  Increase bilateral LE strength 1/2 grade to facilitate independent mobility, Perform all bed mobility tasks independently to decrease fall risk factors, Perform all transfers independently to improve independence, Ambulate at least 100 ft  with roller walker modified independent w/o LOB, Increase all balance 1/2 grade to decrease risk for falls and Improve Barthel Index score to 70 or greater to facilitate independence   PT Treatment Day 0   Plan   Treatment/Interventions Functional transfer training;LE strengthening/ROM; Therapeutic exercise; Endurance training;Cognitive reorientation;Patient/family training;Equipment eval/education; Bed mobility;Gait training   PT Frequency 2-3x/wk   Recommendation   Recommendation Home with family support;Home PT   Equipment Recommended Walker   Barthel Index   Feeding 5   Bathing 0   Grooming Score 5   Dressing Score 5   Bladder Score 5   Bowels Score 10   Toilet Use Score 5   Transfers (Bed/Chair) Score 10   Mobility (Level Surface) Score 0   Stairs Score 0   Barthel Index Score 45       Skilled PT recommended while in hospital and upon DC to progress pt toward treatment goals       Yocha Dehe Yolanda, PT

## 2020-01-28 LAB
ATRIAL RATE: 78 BPM
P AXIS: 90 DEGREES
PR INTERVAL: 312 MS
QRS AXIS: -50 DEGREES
QRSD INTERVAL: 98 MS
QT INTERVAL: 412 MS
QTC INTERVAL: 469 MS
T WAVE AXIS: 40 DEGREES
TROPONIN I SERPL-MCNC: 0.02 NG/ML
TROPONIN I SERPL-MCNC: <0.02 NG/ML
TROPONIN I SERPL-MCNC: <0.02 NG/ML
VENTRICULAR RATE: 78 BPM

## 2020-01-28 PROCEDURE — 99232 SBSQ HOSP IP/OBS MODERATE 35: CPT | Performed by: INTERNAL MEDICINE

## 2020-01-28 PROCEDURE — 84484 ASSAY OF TROPONIN QUANT: CPT | Performed by: NURSE PRACTITIONER

## 2020-01-28 PROCEDURE — 93010 ELECTROCARDIOGRAM REPORT: CPT | Performed by: INTERNAL MEDICINE

## 2020-01-28 RX ORDER — LABETALOL 20 MG/4 ML (5 MG/ML) INTRAVENOUS SYRINGE
10 EVERY 6 HOURS PRN
Status: DISCONTINUED | OUTPATIENT
Start: 2020-01-28 | End: 2020-01-30 | Stop reason: HOSPADM

## 2020-01-28 RX ORDER — AZITHROMYCIN 250 MG/1
500 TABLET, FILM COATED ORAL EVERY 24 HOURS
Status: DISCONTINUED | OUTPATIENT
Start: 2020-01-28 | End: 2020-01-28

## 2020-01-28 RX ADMIN — AMLODIPINE BESYLATE 10 MG: 10 TABLET ORAL at 11:31

## 2020-01-28 RX ADMIN — ROPINIROLE HYDROCHLORIDE 2 MG: 1 TABLET, FILM COATED ORAL at 22:18

## 2020-01-28 RX ADMIN — FAMOTIDINE 20 MG: 20 TABLET ORAL at 11:45

## 2020-01-28 RX ADMIN — GABAPENTIN 100 MG: 100 CAPSULE ORAL at 22:17

## 2020-01-28 RX ADMIN — ALPRAZOLAM 0.25 MG: 0.25 TABLET ORAL at 11:31

## 2020-01-28 RX ADMIN — DORZOLAMIDE HYDROCHLORIDE AND TIMOLOL MALEATE 1 DROP: 20; 5 SOLUTION/ DROPS OPHTHALMIC at 16:50

## 2020-01-28 RX ADMIN — DORZOLAMIDE HYDROCHLORIDE AND TIMOLOL MALEATE 1 DROP: 20; 5 SOLUTION/ DROPS OPHTHALMIC at 11:39

## 2020-01-28 RX ADMIN — ASPIRIN 81 MG 81 MG: 81 TABLET ORAL at 11:35

## 2020-01-28 RX ADMIN — LUBIPROSTONE 24 MCG: 24 CAPSULE, GELATIN COATED ORAL at 16:49

## 2020-01-28 RX ADMIN — GABAPENTIN 100 MG: 100 CAPSULE ORAL at 16:48

## 2020-01-28 RX ADMIN — AMIODARONE HYDROCHLORIDE 100 MG: 200 TABLET ORAL at 11:35

## 2020-01-28 RX ADMIN — LISINOPRIL 40 MG: 20 TABLET ORAL at 11:45

## 2020-01-28 RX ADMIN — TRAZODONE HYDROCHLORIDE 50 MG: 50 TABLET ORAL at 22:18

## 2020-01-28 RX ADMIN — ASPIRIN 81 MG 81 MG: 81 TABLET ORAL at 16:48

## 2020-01-28 NOTE — PLAN OF CARE
Problem: Potential for Falls  Goal: Patient will remain free of falls  Description  INTERVENTIONS:  - Assess patient frequently for physical needs  -  Identify cognitive and physical deficits and behaviors that affect risk of falls    -  [place] fall precautions as indicated by assessment   - Educate patient/family on patient safety including physical limitations  - Instruct patient to call for assistance with activity based on assessment  - Modify environment to reduce risk of injury  - Consider OT/PT consult to assist with strengthening/mobility  Outcome: Progressing     Problem: NEUROSENSORY - ADULT  Goal: Achieves stable or improved neurological status  Description  INTERVENTIONS  - Monitor and report changes in neurological status  - Monitor vital signs such as temperature, blood pressure, glucose, and any other labs ordered   - Initiate measures to prevent increased intracranial pressure  - Monitor for seizure activity and implement precautions if appropriate      Outcome: Progressing     Problem: RESPIRATORY - ADULT  Goal: Achieves optimal ventilation and oxygenation  Description  INTERVENTIONS:  - Assess for changes in respiratory status  - Assess for changes in mentation and behavior  - Position to facilitate oxygenation and minimize respiratory effort  - Oxygen administered by appropriate delivery if ordered  - Initiate smoking cessation education as indicated  - Encourage broncho-pulmonary hygiene including cough, deep breathe, Incentive Spirometry  - Assess the need for suctioning and aspirate as needed  - Assess and instruct to report SOB or any respiratory difficulty  - Respiratory Therapy support as indicated  Outcome: Progressing     Problem: Prexisting or High Potential for Compromised Skin Integrity  Goal: Skin integrity is maintained or improved  Description  INTERVENTIONS:  - Identify patients at risk for skin breakdown  - Assess and monitor skin integrity  - Assess and monitor nutrition and hydration status  - Monitor labs   - Assess for incontinence   - Turn and reposition patient  - Assist with mobility/ambulation  - Relieve pressure over bony prominences  - Avoid friction and shearing  - Provide appropriate hygiene as needed including keeping skin clean and dry  - Evaluate need for skin moisturizer/barrier cream  - Collaborate with interdisciplinary team   - Patient/family teaching  - Consider wound care consult   Outcome: Progressing

## 2020-01-28 NOTE — SOCIAL WORK
LOS 2, patient is not a bundle, patient is not a 30 day readmission  CM called patient's daughter Raudel Thorpe at [phone] to discuss discharge planning  CM name and role introduced  Daughter reports that patient lives with daughter and daughter's  in a ranch home, has "everthing she needs" including walkers, transfer chair, shower chair, grab bars; daughter reports that she provides her full care 24/7 including dressing, bathing, cooking, driving to errands and appointments  Daughter states that patient does not have an official POA established but daughter assists with her medical care and medical decisions  Income is reported as SSI  CM discussed discharge planning including the care team's recommendation for Home PT  Daughter declines [facility] referral; she reports that patient is involved in the Waiver Program, daughter is the paid caretaker, and the Waiver Program provides home health aides if needed  Daughter reports that she has had VNA in the past but didn't feel that the VNA service was doing anything more than she can do herself and the language barrier was a challenge  Daughter to transport home  CM reviewed discharge planning process including the following: identifying caregivers at home, preference for d/c planning needs, availability of treatment team to discuss questions or concerns patient and/or family may have regarding diagnosis, plan of care, old or new medications and discharge planning   CM will continue to follow for care coordination and update assessment as necessary

## 2020-01-28 NOTE — PROGRESS NOTES
The azithromycin has / have been converted to Oral per [facility] IV-to-PO Auto-Conversion Protocol for Adults as approved by the Pharmacy and Therapeutics Committee  The patient met all eligible criteria:  3 Age = 25years old   2) Received at least one dose of the IV form   3) Receiving at least one other scheduled oral/enteral medication   4) Tolerating an oral/enteral diet   and did not have any exclusions:   1) Critical care patient   2) Active GI bleed (IF assessing H2RAs or PPIs)   3) Continuous tube feeding (IF assessing cipro, doxycycline, levofloxacin, minocycline, rifampin, or voriconazole)   4) Receiving PO vancomycin (IF assessing metronidazole)   5) Persistent nausea and/or vomiting   6) Ileus or gastrointestinal obstruction   7) Ilsa/nasogastric tube set for continuous suction   8) Specific order not to automatically convert to PO (in the order's comments or if discussed in the most recent Infectious Disease or primary team's progress notes)

## 2020-01-28 NOTE — QUICK NOTE
Patient's daughter arrived at bedside    She reports that patient is stating she has chest pain while at rest   She is not currently coughing  Check troponins, EKGs, telemetry monitoring

## 2020-01-28 NOTE — ASSESSMENT & PLAN NOTE
Patient is not on [facility]  On aspirin  Will monitor on telemetry for now given new AMS

## 2020-01-28 NOTE — PROGRESS NOTES
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  Assessment & Plan  MRI from May indicative of subacute stroke  Will get MRI again - given new mental status change  Paroxysmal atrial fibrillation [facility])  Assessment & Plan  Patient is not on AC  On aspirin  Will monitor on telemetry for now given new AMS  * Pneumonia due to infectious organism  Assessment & Plan  Legionella negative  procalcitonin negative  Will DC antibiotics  VTE Pharmacologic Prophylaxis:   Pharmacologic: Heparin  Mechanical VTE Prophylaxis in Place: Yes    Patient Centered Rounds: I have performed bedside rounds with nursing staff today  Discussions with Specialists or Other Care Team Provider: Discussed with patient attempted to reach daughter  Will try again later  Education and Discussions with Family / Patient: Discussed with patient  Trying to reach family  Time Spent for Care: 30 minutes  More than 50% of total time spent on counseling and coordination of care as described above  Current Length of Stay: 2 day(s)    Current Patient Status: Inpatient   Certification Statement: The patient will continue to require additional inpatient hospital stay due to confusion/word finding difficulty  Discharge Plan: Not medically stable for DC  Code Status: Level 1 - Full Code      Subjective:   Patient seen and examined  Pleasant  Denies any pain     When asked where she is she nods when I suggest hospital - but then says the word for [facility] instead  Objective:     Vitals:   Temp (24hrs), Av 9 °F (36 6 °C), Min:97 7 °F (36 5 °C), Max:98 2 °F (36 8 °C)    Temp:  [97 7 °F (36 5 °C)-98 2 °F (36 8 °C)] 97 8 °F (36 6 °C)  HR:  [73-97] 73  Resp:  [18-20] 18  BP: (138-164)/(60-81) 161/74  SpO2:  [95 %-97 %] 97 %  Body mass index is 32 13 kg/m²  Input and Output Summary (last 24 hours): Intake/Output Summary (Last 24 hours) at 2020 1539  Last data filed at 2020 0438  Gross per 24 hour   Intake 0 ml   Output 2050 ml   Net -2050 ml       Physical Exam:     Physical Exam   Constitutional: She is oriented to person, place, and time  She appears well-developed and well-nourished  No distress  HENT:   Head: Normocephalic  Mouth/Throat: No oropharyngeal exudate  Eyes: Pupils are equal, round, and reactive to light  Right eye exhibits no discharge  Left eye exhibits no discharge  No scleral icterus  Neck: Normal range of motion  Neck supple  No JVD present  No tracheal deviation present  No thyromegaly present  Cardiovascular: Normal rate  Exam reveals no gallop and no friction rub  No murmur heard  Pulmonary/Chest: Effort normal  No stridor  No respiratory distress  She has no wheezes  She has no rales  She exhibits no tenderness  Abdominal: Soft  She exhibits no distension and no mass  There is no tenderness  There is no rebound and no guarding  No hernia  Musculoskeletal: She exhibits no edema, tenderness or deformity  Lymphadenopathy:     She has no cervical adenopathy  Neurological: She is alert and oriented to person, place, and time  She displays normal reflexes  No cranial nerve deficit or sensory deficit  She exhibits normal muscle tone  Coordination normal    Skin: Capillary refill takes less than 2 seconds  No rash noted  She is not diaphoretic  No erythema  There is pallor  Psychiatric: She has a normal mood and affect           Additional Data: Labs:    Results from last 7 days   Lab Units 01/27/20  0435   WBC Thousand/uL 6 20   HEMOGLOBIN g/dL 12 7   HEMATOCRIT % 39 0   PLATELETS Thousands/uL 223   NEUTROS PCT % 54   LYMPHS PCT % 39   MONOS PCT % 6   EOS PCT % 0     Results from last 7 days   Lab Units 01/27/20  0435 01/26/20  0859   SODIUM mmol/L 139 145   POTASSIUM mmol/L 4 1 4 5   CHLORIDE mmol/L 105 108   CO2 mmol/L 22 26   BUN mg/dL 10 15   CREATININE mg/dL 0 92 1 11   ANION GAP mmol/L 12 11   CALCIUM mg/dL 8 6 8 7   ALBUMIN g/dL  --  3 3*   TOTAL BILIRUBIN mg/dL  --  0 27   ALK PHOS U/L  --  96   ALT U/L  --  14   AST U/L  --  22   GLUCOSE RANDOM mg/dL 99 105     Results from last 7 days   Lab Units 01/26/20  0859   INR  0 98             Results from last 7 days   Lab Units 01/27/20  0435   PROCALCITONIN ng/ml <0 05           * I Have Reviewed All Lab Data Listed Above  * Additional Pertinent Lab Tests Reviewed: All Labs For Current Hospital Admission Reviewed    Imaging:    Imaging Reports Reviewed Today Include:   CXR - no PNA  Imaging Personally Reviewed by Myself Includes:  As above       Recent Cultures (last 7 days):     Results from last 7 days   Lab Units 01/26/20  1739   LEGIONELLA URINARY ANTIGEN  Negative       Last 24 Hours Medication List:     Current Facility-Administered Medications:  acetaminophen 650 mg Oral Q6H PRN Mirela Hernandez MD    ALPRAZolam 0 25 mg Oral BID PRN Mirela Hernandez MD    amiodarone 100 mg Oral Daily Mirela Hernandez MD    amLODIPine 10 mg Oral Daily Mirela Hernandez MD    aspirin 81 mg Oral BID Mirela Hernandez MD    cyclobenzaprine 5 mg Oral TID PRN Mirela Hernandez MD    dorzolamide-timolol 1 drop Left Eye BID Mirela Hernandez MD    enoxaparin 30 mg Subcutaneous Daily Mirela Hernandez MD    famotidine 20 mg Oral Daily Mirela Hernandez MD    gabapentin 100 mg Oral TID Mirela Hernandez MD    guaiFENesin 200 mg Oral Q4H PRN Mirela Hernandez MD    hydrALAZINE 10 mg Oral BID PRN Aris Lara, MD    HYDROcodone-acetaminophen 1 tablet Oral Q6H PRN Manisha Bills MD    Labetalol HCl 10 mg Intravenous Q6H PRN Shawnee Canavan, MD    levalbuterol 0 63 mg Nebulization Q8H PRN Manisha Bills MD    lisinopril 20 mg Oral HS PRN Manisha Bills MD    lisinopril 40 mg Oral After Breakfast Manisha Bills MD    lubiprostone 24 mcg Oral BID With Meals Aris Lara MD    ondansetron 4 mg Intravenous Q6H PRN Manisha Bills MD    polyethylene glycol 17 g Oral Daily PRN Manisha Bills MD    rOPINIRole 2 mg Oral HS Manisha Bills MD    sodium chloride 75 mL/hr Intravenous Continuous Manisha Bills MD Last Rate: 75 mL/hr (01/27/20 1807)   traZODone 50 mg Oral HS Manisha Bills MD         Today, Patient Was Seen By: Shawnee Canavan, MD    ** Please Note: Dictation voice to text software may have been used in the creation of this document   **

## 2020-01-28 NOTE — PLAN OF CARE
Problem: Potential for Falls  Goal: Patient will remain free of falls  Description  INTERVENTIONS:  - Assess patient frequently for physical needs  -  Identify cognitive and physical deficits and behaviors that affect risk of falls    -  [place] fall precautions as indicated by assessment   - Educate patient/family on patient safety including physical limitations  - Instruct patient to call for assistance with activity based on assessment  - Modify environment to reduce risk of injury  - Consider OT/PT consult to assist with strengthening/mobility  Outcome: Progressing     Problem: NEUROSENSORY - ADULT  Goal: Achieves stable or improved neurological status  Description  INTERVENTIONS  - Monitor and report changes in neurological status  - Monitor vital signs such as temperature, blood pressure, glucose, and any other labs ordered   - Initiate measures to prevent increased intracranial pressure  - Monitor for seizure activity and implement precautions if appropriate      Outcome: Progressing     Problem: RESPIRATORY - ADULT  Goal: Achieves optimal ventilation and oxygenation  Description  INTERVENTIONS:  - Assess for changes in respiratory status  - Assess for changes in mentation and behavior  - Position to facilitate oxygenation and minimize respiratory effort  - Oxygen administered by appropriate delivery if ordered  - Initiate smoking cessation education as indicated  - Encourage broncho-pulmonary hygiene including cough, deep breathe, Incentive Spirometry  - Assess the need for suctioning and aspirate as needed  - Assess and instruct to report SOB or any respiratory difficulty  - Respiratory Therapy support as indicated  Outcome: Progressing     Problem: Prexisting or High Potential for Compromised Skin Integrity  Goal: Skin integrity is maintained or improved  Description  INTERVENTIONS:  - Identify patients at risk for skin breakdown  - Assess and monitor skin integrity  - Assess and monitor nutrition and hydration status  - Monitor labs   - Assess for incontinence   - Turn and reposition patient  - Assist with mobility/ambulation  - Relieve pressure over bony prominences  - Avoid friction and shearing  - Provide appropriate hygiene as needed including keeping skin clean and dry  - Evaluate need for skin moisturizer/barrier cream  - Collaborate with interdisciplinary team   - Patient/family teaching  - Consider wound care consult   Outcome: Progressing     Problem: Nutrition/Hydration-ADULT  Goal: Nutrient/Hydration intake appropriate for improving, restoring or maintaining nutritional needs  Description  Monitor and assess patient's nutrition/hydration status for malnutrition  Collaborate with interdisciplinary team and initiate plan and interventions as ordered  Monitor patient's weight and dietary intake as ordered or per policy  Utilize nutrition screening tool and intervene as necessary  Determine patient's food preferences and provide high-protein, high-caloric foods as appropriate       INTERVENTIONS:  - Monitor oral intake, urinary output, labs, and treatment plans  - Assess nutrition and hydration status and recommend course of action  - Evaluate amount of meals eaten  - Assist patient with eating if necessary   - Allow adequate time for meals  - Recommend/ encourage appropriate diets, oral nutritional supplements, and vitamin/mineral supplements  - Order, calculate, and assess calorie counts as needed  - Recommend, monitor, and adjust tube feedings and TPN/PPN based on assessed needs  - Assess need for intravenous fluids  - Provide specific nutrition/hydration education as appropriate  - Include patient/family/caregiver in decisions related to nutrition  Outcome: Progressing

## 2020-01-29 ENCOUNTER — APPOINTMENT (INPATIENT)
Dept: MRI IMAGING | Facility: HOSPITAL | Age: 85
DRG: 152 | End: 2020-01-29
Payer: MEDICARE

## 2020-01-29 PROCEDURE — 97116 GAIT TRAINING THERAPY: CPT

## 2020-01-29 PROCEDURE — 70551 MRI BRAIN STEM W/O DYE: CPT

## 2020-01-29 PROCEDURE — 97110 THERAPEUTIC EXERCISES: CPT

## 2020-01-29 PROCEDURE — 97530 THERAPEUTIC ACTIVITIES: CPT

## 2020-01-29 PROCEDURE — 99232 SBSQ HOSP IP/OBS MODERATE 35: CPT | Performed by: INTERNAL MEDICINE

## 2020-01-29 RX ORDER — GABAPENTIN 100 MG/1
100 CAPSULE ORAL
Status: DISCONTINUED | OUTPATIENT
Start: 2020-01-29 | End: 2020-01-30 | Stop reason: HOSPADM

## 2020-01-29 RX ADMIN — TRAZODONE HYDROCHLORIDE 50 MG: 50 TABLET ORAL at 21:18

## 2020-01-29 RX ADMIN — DORZOLAMIDE HYDROCHLORIDE AND TIMOLOL MALEATE 1 DROP: 20; 5 SOLUTION/ DROPS OPHTHALMIC at 17:31

## 2020-01-29 RX ADMIN — GABAPENTIN 100 MG: 100 CAPSULE ORAL at 21:17

## 2020-01-29 RX ADMIN — AMLODIPINE BESYLATE 10 MG: 10 TABLET ORAL at 08:25

## 2020-01-29 RX ADMIN — ASPIRIN 81 MG 81 MG: 81 TABLET ORAL at 17:31

## 2020-01-29 RX ADMIN — DORZOLAMIDE HYDROCHLORIDE AND TIMOLOL MALEATE 1 DROP: 20; 5 SOLUTION/ DROPS OPHTHALMIC at 08:28

## 2020-01-29 RX ADMIN — AMIODARONE HYDROCHLORIDE 100 MG: 200 TABLET ORAL at 08:26

## 2020-01-29 RX ADMIN — LISINOPRIL 40 MG: 20 TABLET ORAL at 08:26

## 2020-01-29 RX ADMIN — ONDANSETRON 4 MG: 2 INJECTION INTRAMUSCULAR; INTRAVENOUS at 08:26

## 2020-01-29 RX ADMIN — ROPINIROLE HYDROCHLORIDE 2 MG: 1 TABLET, FILM COATED ORAL at 21:18

## 2020-01-29 RX ADMIN — LUBIPROSTONE 24 MCG: 24 CAPSULE, GELATIN COATED ORAL at 17:31

## 2020-01-29 RX ADMIN — FAMOTIDINE 20 MG: 20 TABLET ORAL at 08:26

## 2020-01-29 RX ADMIN — GABAPENTIN 100 MG: 100 CAPSULE ORAL at 08:26

## 2020-01-29 RX ADMIN — ASPIRIN 81 MG 81 MG: 81 TABLET ORAL at 08:26

## 2020-01-29 RX ADMIN — LUBIPROSTONE 24 MCG: 24 CAPSULE, GELATIN COATED ORAL at 08:27

## 2020-01-29 NOTE — PLAN OF CARE
Problem: Potential for Falls  Goal: Patient will remain free of falls  Description  INTERVENTIONS:  - Assess patient frequently for physical needs  -  Identify cognitive and physical deficits and behaviors that affect risk of falls    -  [place] fall precautions as indicated by assessment   - Educate patient/family on patient safety including physical limitations  - Instruct patient to call for assistance with activity based on assessment  - Modify environment to reduce risk of injury  - Consider OT/PT consult to assist with strengthening/mobility  Outcome: Progressing     Problem: NEUROSENSORY - ADULT  Goal: Achieves stable or improved neurological status  Description  INTERVENTIONS  - Monitor and report changes in neurological status  - Monitor vital signs such as temperature, blood pressure, glucose, and any other labs ordered   - Initiate measures to prevent increased intracranial pressure  - Monitor for seizure activity and implement precautions if appropriate      Outcome: Progressing     Problem: RESPIRATORY - ADULT  Goal: Achieves optimal ventilation and oxygenation  Description  INTERVENTIONS:  - Assess for changes in respiratory status  - Assess for changes in mentation and behavior  - Position to facilitate oxygenation and minimize respiratory effort  - Oxygen administered by appropriate delivery if ordered  - Initiate smoking cessation education as indicated  - Encourage broncho-pulmonary hygiene including cough, deep breathe, Incentive Spirometry  - Assess the need for suctioning and aspirate as needed  - Assess and instruct to report SOB or any respiratory difficulty  - Respiratory Therapy support as indicated  Outcome: Progressing     Problem: Prexisting or High Potential for Compromised Skin Integrity  Goal: Skin integrity is maintained or improved  Description  INTERVENTIONS:  - Identify patients at risk for skin breakdown  - Assess and monitor skin integrity  - Assess and monitor nutrition and hydration status  - Monitor labs   - Assess for incontinence   - Turn and reposition patient  - Assist with mobility/ambulation  - Relieve pressure over bony prominences  - Avoid friction and shearing  - Provide appropriate hygiene as needed including keeping skin clean and dry  - Evaluate need for skin moisturizer/barrier cream  - Collaborate with interdisciplinary team   - Patient/family teaching  - Consider wound care consult   Outcome: Progressing     Problem: Nutrition/Hydration-ADULT  Goal: Nutrient/Hydration intake appropriate for improving, restoring or maintaining nutritional needs  Description  Monitor and assess patient's nutrition/hydration status for malnutrition  Collaborate with interdisciplinary team and initiate plan and interventions as ordered  Monitor patient's weight and dietary intake as ordered or per policy  Utilize nutrition screening tool and intervene as necessary  Determine patient's food preferences and provide high-protein, high-caloric foods as appropriate       INTERVENTIONS:  - Monitor oral intake, urinary output, labs, and treatment plans  - Assess nutrition and hydration status and recommend course of action  - Evaluate amount of meals eaten  - Assist patient with eating if necessary   - Allow adequate time for meals  - Recommend/ encourage appropriate diets, oral nutritional supplements, and vitamin/mineral supplements  - Order, calculate, and assess calorie counts as needed  - Recommend, monitor, and adjust tube feedings and TPN/PPN based on assessed needs  - Assess need for intravenous fluids  - Provide specific nutrition/hydration education as appropriate  - Include patient/family/caregiver in decisions related to nutrition  Outcome: Progressing

## 2020-01-29 NOTE — ASSESSMENT & PLAN NOTE
Patient was on 100 mg gabapentin 3 times a day      I suspect this is contributing to confusional state 9 tapering it down currently she is 100 mg HS

## 2020-01-29 NOTE — ASSESSMENT & PLAN NOTE
Patient is not on [facility]  On aspirin  Will monitor on telemetry for now given new AMS     Rate controlled currently

## 2020-01-29 NOTE — PHYSICAL THERAPY NOTE
Physical Therapy Treatment Note     01/29/20 0934   Pain Assessment   Pain Assessment No/denies pain   Restrictions/Precautions   Weight Bearing Precautions Per Order No   Other Precautions Bed Alarm; Fall Risk;Pain;Visual impairment  (Language barrier polish speaking only )   General   Chart Reviewed Yes   Family/Caregiver Present Yes  (daughter present t/o session for translation)   Cognition   Overall Cognitive Status WFL   Subjective   Subjective Agreeable to PT  Daughter present in room t/o session  pt  in bed upon entry finishing up transferring back from [facility] with daughter  Bed Mobility   Supine to Sit 5  Supervision   Additional items Assist x 1;HOB elevated; Bedrails; Increased time required   Sit to Supine 5  Supervision   Additional items HOB elevated   Transfers   Sit to Stand 5  Supervision   Additional items Assist x 1;Verbal cues;Armrests   Stand to Sit 5  Supervision   Additional items Assist x 1; Armrests; Verbal cues   Stand pivot 4  Minimal assistance   Additional items Assist x 1;Verbal cues   Ambulation/Elevation   Gait pattern Decreased foot clearance; Forward Flexion; Improper Weight shift;Narrow KENIA; Antalgic; Short stride; Excessively slow  (Unsteady)   Gait Assistance 4  Minimal assist   Additional items Assist x 1;Verbal cues; Assist x 2;Other (Comment)  (2nd A for chair follow only)   Assistive Device Rolling walker   Distance 14ft, 10ft, 18ft, 46ft    Balance   Static Sitting Good   Ambulatory Poor +   Endurance Deficit   Endurance Deficit Yes   Endurance Deficit Description Pain   Activity Tolerance   Activity Tolerance Patient limited by pain; Patient tolerated treatment well   Nurse Made Aware Yes   Exercises   THR Supine;Sitting;20 reps;Bilateral;AAROM;AROM   Assessment   Prognosis Fair   Problem List Decreased strength;Decreased range of motion;Decreased endurance; Impaired balance;Decreased mobility; Impaired judgement;Decreased safety awareness;Pain; Impaired vision   Assessment Pt  noted with improved mobility this session  pt  needed cues for safety issac, for transfers and gait - hand placement, increased heelstrike and staying close to RW  Poor carry over noted  Pt  reported pain in her hips during ambulation however noted decreased antalgic gait as session profressed  Pt  initally reported discomfort in her chest area but no visible distress noted  Patient's daughter reported she c/o hip pain at home and is due to arthritis  Pt  fatigues with exertion  Continue skilled PT to maximize functional mobility  Cues to and demonstarted to decrease excess WBing onto RW with UEs   Barriers to Discharge None   Goals   Patient Goals None reported   STG Expiration Date 02/06/20   PT Treatment Day 1   Plan   Treatment/Interventions Functional transfer training;LE strengthening/ROM; Therapeutic exercise;Equipment eval/education;Patient/family training;Bed mobility;Gait training;Spoke to nursing;Family   Progress Progressing toward goals   PT Frequency 2-3x/wk   Recommendation   Recommendation Home PT; Home with family support   Equipment Recommended Walker   PT - OK to Discharge Yes         Yvrose Ramirez, PTA

## 2020-01-29 NOTE — ASSESSMENT & PLAN NOTE
Patient does not appear confused  Has difficult finding the right words  Unclear whether there was a metabolic encephalopathy  There is a language barrier I spoke with the patient in [facility] yesterday and she was confused as to location but oriented to person and time      Currently she is alert and oriented x3

## 2020-01-29 NOTE — ASSESSMENT & PLAN NOTE
MRI from May indicative of subacute stroke  Will get MRI again - given new mental status change  Now resolving  Initial stroke was likely associated with hypertension however I did discuss at length daughter regarding anticoagulation and atrial fibrillation  The patient has a history of right shoulder hematoma when previously on anticoagulation and so we decided again that it would be in her best interest to stay off of anticoagulation for now

## 2020-01-29 NOTE — PLAN OF CARE
Problem: Potential for Falls  Goal: Patient will remain free of falls  Description  INTERVENTIONS:  - Assess patient frequently for physical needs  -  Identify cognitive and physical deficits and behaviors that affect risk of falls    -  [place] fall precautions as indicated by assessment   - Educate patient/family on patient safety including physical limitations  - Instruct patient to call for assistance with activity based on assessment  - Modify environment to reduce risk of injury  - Consider OT/PT consult to assist with strengthening/mobility  Outcome: Progressing     Problem: NEUROSENSORY - ADULT  Goal: Achieves stable or improved neurological status  Description  INTERVENTIONS  - Monitor and report changes in neurological status  - Monitor vital signs such as temperature, blood pressure, glucose, and any other labs ordered   - Initiate measures to prevent increased intracranial pressure  - Monitor for seizure activity and implement precautions if appropriate      Outcome: Progressing     Problem: RESPIRATORY - ADULT  Goal: Achieves optimal ventilation and oxygenation  Description  INTERVENTIONS:  - Assess for changes in respiratory status  - Assess for changes in mentation and behavior  - Position to facilitate oxygenation and minimize respiratory effort  - Oxygen administered by appropriate delivery if ordered  - Initiate smoking cessation education as indicated  - Encourage broncho-pulmonary hygiene including cough, deep breathe, Incentive Spirometry  - Assess the need for suctioning and aspirate as needed  - Assess and instruct to report SOB or any respiratory difficulty  - Respiratory Therapy support as indicated  Outcome: Progressing     Problem: Prexisting or High Potential for Compromised Skin Integrity  Goal: Skin integrity is maintained or improved  Description  INTERVENTIONS:  - Identify patients at risk for skin breakdown  - Assess and monitor skin integrity  - Assess and monitor nutrition and hydration status  - Monitor labs   - Assess for incontinence   - Turn and reposition patient  - Assist with mobility/ambulation  - Relieve pressure over bony prominences  - Avoid friction and shearing  - Provide appropriate hygiene as needed including keeping skin clean and dry  - Evaluate need for skin moisturizer/barrier cream  - Collaborate with interdisciplinary team   - Patient/family teaching  - Consider wound care consult   Outcome: Progressing     Problem: Nutrition/Hydration-ADULT  Goal: Nutrient/Hydration intake appropriate for improving, restoring or maintaining nutritional needs  Description  Monitor and assess patient's nutrition/hydration status for malnutrition  Collaborate with interdisciplinary team and initiate plan and interventions as ordered  Monitor patient's weight and dietary intake as ordered or per policy  Utilize nutrition screening tool and intervene as necessary  Determine patient's food preferences and provide high-protein, high-caloric foods as appropriate       INTERVENTIONS:  - Monitor oral intake, urinary output, labs, and treatment plans  - Assess nutrition and hydration status and recommend course of action  - Evaluate amount of meals eaten  - Assist patient with eating if necessary   - Allow adequate time for meals  - Recommend/ encourage appropriate diets, oral nutritional supplements, and vitamin/mineral supplements  - Order, calculate, and assess calorie counts as needed  - Recommend, monitor, and adjust tube feedings and TPN/PPN based on assessed needs  - Assess need for intravenous fluids  - Provide specific nutrition/hydration education as appropriate  - Include patient/family/caregiver in decisions related to nutrition  Outcome: Progressing

## 2020-01-29 NOTE — PLAN OF CARE
Problem: PHYSICAL THERAPY ADULT  Goal: Performs mobility at highest level of function for planned discharge setting  See evaluation for individualized goals  Description  Treatment/Interventions: Functional transfer training, LE strengthening/ROM, Therapeutic exercise, Endurance training, Cognitive reorientation, Patient/family training, Equipment eval/education, Bed mobility, Gait training  Equipment Recommended: Jennifer Cavanaugh       See flowsheet documentation for full assessment, interventions and recommendations  Outcome: Progressing  Note:   Prognosis: Fair  Problem List: Decreased strength, Decreased range of motion, Decreased endurance, Impaired balance, Decreased mobility, Impaired judgement, Decreased safety awareness, Pain, Impaired vision  Assessment: Pt  noted with improved mobility this session  pt  needed cues for safety issac, for transfers and gait - hand placement, increased heelstrike and staying close to RW  Poor carry over noted  Pt  reported pain in her hips during ambulation however noted decreased antalgic gait as session profressed  Pt  initally reported discomfort in her chest area but no visible distress noted  Patient's daughter reported she c/o hip pain at home and is due to arthritis  Pt  fatigues with exertion  Continue skilled PT to maximize functional mobility  Cues to and demonstarted to decrease excess WBing onto RW with UEs  Barriers to Discharge: None     Recommendation: Home PT, Home with family support     PT - OK to Discharge: Yes    See flowsheet documentation for full assessment

## 2020-01-29 NOTE — PROGRESS NOTES
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)  Assessment & Plan  Patient is not on AC  On aspirin  Will monitor on telemetry for now given new AMS  Rate controlled currently    Peripheral neuropathy  Assessment & Plan  Patient was on 100 mg gabapentin 3 times a day  I suspect this is contributing to confusional state 9 tapering it down currently she is 100 mg HS    * Pneumonia due to infectious organism  Assessment & Plan  Bacterial pneumonia ruled out  Procalcitonin negative cultures negative  VTE Pharmacologic Prophylaxis:   Pharmacologic: Heparin  Mechanical VTE Prophylaxis in Place: Yes    Patient Centered Rounds: I have performed bedside rounds with nursing staff today  Discussions with Specialists or Other Care Team Provider: Discussed with nursing and CM       Education and Discussions with Family / Patient: discussed with daughter  Time Spent for Care: 30 minutes  More than 50% of total time spent on counseling and coordination of care as described above  Current Length of Stay: 3 day(s)    Current Patient Status: Inpatient   Certification Statement: The patient will continue to require additional inpatient hospital stay due to awaiting MRI  Discharge Plan: Likely tomorrow  Code Status: Level 1 - Full Code      Subjective:   Patient seen and examined  Feeling "well"  No complaints  Denies pain  Objective:     Vitals:   Temp (24hrs), Av 2 °F (36 8 °C), Min:97 8 °F (36 6 °C), Max:98 7 °F (37 1 °C)    Temp:  [97 8 °F (36 6 °C)-98 7 °F (37 1 °C)] 98 7 °F (37 1 °C)  HR:  [73-81] 81  Resp:  [18] 18  BP: (119-161)/(63-75) 136/75  SpO2:  [95 %-98 %] 95 %  Body mass index is 32 13 kg/m²  Input and Output Summary (last 24 hours):     No intake or output data in the 24 hours ending 20 1146    Physical Exam:     Physical Exam   Constitutional: She is oriented to person, place, and time  Elderly, frail  Eyes: Right eye exhibits no discharge  Left eye exhibits no discharge  No scleral icterus  Neck: Normal range of motion  No JVD present  No tracheal deviation present  No thyromegaly present  Cardiovascular: Normal rate  Exam reveals no gallop and no friction rub  No murmur heard  Pulmonary/Chest: Effort normal  No stridor  No respiratory distress  She has no wheezes  She has no rales  She exhibits no tenderness  Abdominal: Soft  She exhibits no distension and no mass  There is no tenderness  There is no rebound and no guarding  No hernia  Musculoskeletal: She exhibits no edema, tenderness or deformity  Lymphadenopathy:     She has no cervical adenopathy  Neurological: She is alert and oriented to person, place, and time  She displays normal reflexes  No cranial nerve deficit or sensory deficit  She exhibits normal muscle tone  Coordination normal    Skin: Capillary refill takes less than 2 seconds  No rash noted  No erythema  There is pallor  Psychiatric: She has a normal mood and affect  Additional Data:     Labs:    Results from last 7 days   Lab Units 01/27/20  0435   WBC Thousand/uL 6 20   HEMOGLOBIN g/dL 12 7   HEMATOCRIT % 39 0   PLATELETS Thousands/uL 223   NEUTROS PCT % 54   LYMPHS PCT % 39   MONOS PCT % 6   EOS PCT % 0     Results from last 7 days   Lab Units 01/27/20  0435 01/26/20  0859   SODIUM mmol/L 139 145   POTASSIUM mmol/L 4 1 4 5   CHLORIDE mmol/L 105 108   CO2 mmol/L 22 26   BUN mg/dL 10 15   CREATININE mg/dL 0 92 1 11   ANION GAP mmol/L 12 11   CALCIUM mg/dL 8 6 8 7   ALBUMIN g/dL  --  3 3*   TOTAL BILIRUBIN mg/dL  --  0 27   ALK PHOS U/L  --  96   ALT U/L  --  14   AST U/L  --  22   GLUCOSE RANDOM mg/dL 99 105     Results from last 7 days   Lab Units 01/26/20  0859   INR  0 98             Results from last 7 days   Lab Units 01/27/20  0435   PROCALCITONIN ng/ml <0 05           * I Have Reviewed All Lab Data Listed Above  * Additional Pertinent Lab Tests Reviewed: Yanni 66 Admission Reviewed    Imaging:    Imaging Reports Reviewed Today Include:   CT chest abdomen wo contrast -   "1   No evidence of acute abnormality in the chest, abdomen or pelvis  2   Colonic diverticulosis without evidence of acute diverticulitis    3   3 4 x 2 5 x 3 8 cm right adnexal cyst, unchanged since a CT from 5/10/2019   If clinically indicated, this can be better characterized with nonemergent pelvic ultrasound "  Imaging Personally Reviewed by Myself Includes:  As above     Recent Cultures (last 7 days):     Results from last 7 days   Lab Units 01/26/20  1739   LEGIONELLA URINARY ANTIGEN  Negative       Last 24 Hours Medication List:     Current Facility-Administered Medications:  acetaminophen 650 mg Oral Q6H PRN Kristy Obando MD    ALPRAZolam 0 25 mg Oral BID PRN Kristy Obando MD    amiodarone 100 mg Oral Daily Arthur Balloon, MD    amLODIPine 10 mg Oral Daily Arthur Balloon, MD    aspirin 81 mg Oral BID Arthur Balloon, MD    cyclobenzaprine 5 mg Oral TID PRN Arthur Balloon, MD    dorzolamide-timolol 1 drop Left Eye BID Arthur Balloon, MD    enoxaparin 30 mg Subcutaneous Daily Arthur Balloon, MD    famotidine 20 mg Oral Daily Arthur Balloon, MD    gabapentin 100 mg Oral HS Thad Barba, MD    guaiFENesin 200 mg Oral Q4H PRN Arthur Balloon, MD    hydrALAZINE 10 mg Oral BID PRN Arthur Balloon, MD    HYDROcodone-acetaminophen 1 tablet Oral Q6H PRN Arthur Balloon, MD    Labetalol HCl 10 mg Intravenous Q6H PRN Thad Barba, MD    levalbuterol 0 63 mg Nebulization Q8H PRN Arthur Balloon, MD    lisinopril 20 mg Oral HS PRN Arthur Balloon, MD    lisinopril 40 mg Oral After Breakfast Arthur Balloon, MD    lubiprostone 24 mcg Oral BID With Meals Aris Lara MD    ondansetron 4 mg Intravenous Q6H PRN Arthur Balloon, MD    polyethylene glycol 17 g Oral Daily PRN Arthur Balloon, MD    rOPINIRole 2 mg Oral HS Arthur Balloon, MD    sodium chloride 75 mL/hr Intravenous Continuous Arthur Balloon, MD Last Rate: 75 mL/hr (01/27/20 1807)   traZODone 50 mg Oral HS Arthur Balloon, MD         Today, Patient Was Seen By: Thad Barba MD    ** Please Note: Dictation voice to text software may have been used in the creation of this document   **

## 2020-01-30 VITALS
OXYGEN SATURATION: 93 % | HEART RATE: 79 BPM | DIASTOLIC BLOOD PRESSURE: 95 MMHG | TEMPERATURE: 98.7 F | SYSTOLIC BLOOD PRESSURE: 141 MMHG | RESPIRATION RATE: 18 BRPM | BODY MASS INDEX: 32.13 KG/M2 | WEIGHT: 143.3 LBS

## 2020-01-30 DIAGNOSIS — G89.4 CHRONIC PAIN SYNDROME: ICD-10-CM

## 2020-01-30 DIAGNOSIS — G62.9 PERIPHERAL NEUROPATHY: ICD-10-CM

## 2020-01-30 PROCEDURE — 99239 HOSP IP/OBS DSCHRG MGMT >30: CPT | Performed by: INTERNAL MEDICINE

## 2020-01-30 RX ORDER — CYCLOBENZAPRINE HCL 5 MG
TABLET ORAL
Qty: 90 TABLET | Refills: 0 | Status: SHIPPED | OUTPATIENT
Start: 2020-01-30 | End: 2020-03-04

## 2020-01-30 RX ORDER — LISINOPRIL 20 MG/1
20 TABLET ORAL
Qty: 30 TABLET | Refills: 0 | Status: SHIPPED | OUTPATIENT
Start: 2020-01-30 | End: 2021-01-01 | Stop reason: SDUPTHER

## 2020-01-30 RX ORDER — GABAPENTIN 100 MG/1
100 CAPSULE ORAL 2 TIMES DAILY
Qty: 60 CAPSULE | Refills: 0 | Status: SHIPPED | OUTPATIENT
Start: 2020-01-30 | End: 2020-09-16 | Stop reason: SDUPTHER

## 2020-01-30 RX ADMIN — DORZOLAMIDE HYDROCHLORIDE AND TIMOLOL MALEATE 1 DROP: 20; 5 SOLUTION/ DROPS OPHTHALMIC at 08:34

## 2020-01-30 RX ADMIN — LISINOPRIL 40 MG: 20 TABLET ORAL at 08:33

## 2020-01-30 RX ADMIN — AMIODARONE HYDROCHLORIDE 100 MG: 200 TABLET ORAL at 08:33

## 2020-01-30 RX ADMIN — AMLODIPINE BESYLATE 10 MG: 10 TABLET ORAL at 08:33

## 2020-01-30 RX ADMIN — ASPIRIN 81 MG 81 MG: 81 TABLET ORAL at 08:33

## 2020-01-30 RX ADMIN — LUBIPROSTONE 24 MCG: 24 CAPSULE, GELATIN COATED ORAL at 08:34

## 2020-01-30 RX ADMIN — FAMOTIDINE 20 MG: 20 TABLET ORAL at 08:33

## 2020-01-30 RX ADMIN — ACETAMINOPHEN 650 MG: 325 TABLET, FILM COATED ORAL at 08:37

## 2020-01-30 NOTE — DISCHARGE SUMMARY
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  Assessment & Plan  MRI from May indicative of subacute stroke  Repeat MRI   Paroxysmal atrial fibrillation [facility])  Assessment & Plan  Patient is not on AC  On aspirin  Will monitor on telemetry for now given new AMS  Rate controlled currently    * Pneumonia due to infectious organism  Assessment & Plan  Bacterial pneumonia ruled out  Procalcitonin negative cultures negative  Not on antibiotics  Patient's symptoms with confusion were likely secondary to a metabolic encephalopathy secondary to viral URTI            Discharging Physician / Practitioner: Janelle Mckoy MD  PCP: Mariama Hubbard DO  Admission Date:   Admission Orders (From admission, onward)     Ordered        01/26/20 1310  Inpatient Admission  Once                   Discharge Date: 01/30/20    Resolved Problems  Date Reviewed: 1/30/2020    None          Consultations During Hospital Stay:  · None  Procedures Performed:   · MRI brain - no acute changes  Significant Findings / Test Results:   · None  Incidental Findings:   · None      Test Results Pending at Discharge (will require follow up): · None  Outpatient Tests Requested:  · None  Complications:  None  Reason for Admission:   Confusion and concern for infection   Hospital Course:     Sigrid Ortega is a 80 y o  female patient who originally presented to the hospital on 1/26/2020 due to confusion and concern for infection      Imaging was consistent with a possible pneumonia however procalcitonin was negative and so antibiotics were discontinued  The patient also had some transient confusion, and word-finding difficulty which resolved at the time of discharge  Given her history of stroke and AFib not on anticoagulation we did do an MRI which was negative  At time of discharge the patient was back to baseline in terms of mental status  Please see above list of diagnoses and related plan for additional information  Condition at Discharge: stable     Discharge Day Visit / Exam:     Subjective:    Vitals: Blood Pressure: 141/95 (01/30/20 0700)  Pulse: 79 (01/30/20 0700)  Temperature: 98 7 °F (37 1 °C) (01/30/20 0700)  Temp Source: Oral (01/30/20 0700)  Respirations: 18 (01/30/20 0700)  Weight - Scale: 65 kg (143 lb 4 8 oz) (01/26/20 1458)  SpO2: 93 % (01/30/20 0700)  Exam:   Physical Exam   Constitutional: No distress  Elderly frail  HENT:   Head: Normocephalic  Mouth/Throat: No oropharyngeal exudate  Eyes: Pupils are equal, round, and reactive to light  Right eye exhibits no discharge  Left eye exhibits no discharge  No scleral icterus  Neck: Normal range of motion  No JVD present  No tracheal deviation present  No thyromegaly present  Cardiovascular: Normal rate  Exam reveals no gallop and no friction rub  No murmur heard  Pulmonary/Chest: Effort normal  No stridor  No respiratory distress  She has no wheezes  She has no rales  She exhibits no tenderness  Abdominal: Soft  She exhibits no distension and no mass  There is no tenderness  There is no rebound and no guarding  No hernia  Musculoskeletal: She exhibits no edema, tenderness or deformity  Lymphadenopathy:     She has no cervical adenopathy  Neurological: She is alert  She displays normal reflexes  No cranial nerve deficit or sensory deficit  She exhibits normal muscle tone  Coordination normal    Skin: Capillary refill takes less than 2 seconds  No rash noted  She is not diaphoretic   No erythema  There is pallor  Discussion with Family: Discussed with patient and with daughter   Discharge instructions/Information to patient and family:   See after visit summary for information provided to patient and family  Provisions for Follow-Up Care:  See after visit summary for information related to follow-up care and any pertinent home health orders  Disposition:     Home    For Discharges to [facility]:   · Not Applicable to this Patient - Not Applicable to this Patient    Planned Readmission:   None  Discharge Statement:  I spent 45 minutes discharging the patient  This time was spent on the day of discharge  I had direct contact with the patient on the day of discharge  Greater than 50% of the total time was spent examining patient, answering all patient questions, arranging and discussing plan of care with patient as well as directly providing post-discharge instructions  Additional time then spent on discharge activities  Discharge Medications:  See after visit summary for reconciled discharge medications provided to patient and family        ** Please Note: This note has been constructed using a voice recognition system **

## 2020-01-30 NOTE — PLAN OF CARE
Problem: Potential for Falls  Goal: Patient will remain free of falls  Description  INTERVENTIONS:  - Assess patient frequently for physical needs  -  Identify cognitive and physical deficits and behaviors that affect risk of falls    -  [place] fall precautions as indicated by assessment   - Educate patient/family on patient safety including physical limitations  - Instruct patient to call for assistance with activity based on assessment  - Modify environment to reduce risk of injury  - Consider OT/PT consult to assist with strengthening/mobility  Outcome: Completed     Problem: NEUROSENSORY - ADULT  Goal: Achieves stable or improved neurological status  Description  INTERVENTIONS  - Monitor and report changes in neurological status  - Monitor vital signs such as temperature, blood pressure, glucose, and any other labs ordered   - Initiate measures to prevent increased intracranial pressure  - Monitor for seizure activity and implement precautions if appropriate      Outcome: Completed     Problem: RESPIRATORY - ADULT  Goal: Achieves optimal ventilation and oxygenation  Description  INTERVENTIONS:  - Assess for changes in respiratory status  - Assess for changes in mentation and behavior  - Position to facilitate oxygenation and minimize respiratory effort  - Oxygen administered by appropriate delivery if ordered  - Initiate smoking cessation education as indicated  - Encourage broncho-pulmonary hygiene including cough, deep breathe, Incentive Spirometry  - Assess the need for suctioning and aspirate as needed  - Assess and instruct to report SOB or any respiratory difficulty  - Respiratory Therapy support as indicated  Outcome: Completed     Problem: Prexisting or High Potential for Compromised Skin Integrity  Goal: Skin integrity is maintained or improved  Description  INTERVENTIONS:  - Identify patients at risk for skin breakdown  - Assess and monitor skin integrity  - Assess and monitor nutrition and hydration status  - Monitor labs   - Assess for incontinence   - Turn and reposition patient  - Assist with mobility/ambulation  - Relieve pressure over bony prominences  - Avoid friction and shearing  - Provide appropriate hygiene as needed including keeping skin clean and dry  - Evaluate need for skin moisturizer/barrier cream  - Collaborate with interdisciplinary team   - Patient/family teaching  - Consider wound care consult   Outcome: Completed     Problem: Nutrition/Hydration-ADULT  Goal: Nutrient/Hydration intake appropriate for improving, restoring or maintaining nutritional needs  Description  Monitor and assess patient's nutrition/hydration status for malnutrition  Collaborate with interdisciplinary team and initiate plan and interventions as ordered  Monitor patient's weight and dietary intake as ordered or per policy  Utilize nutrition screening tool and intervene as necessary  Determine patient's food preferences and provide high-protein, high-caloric foods as appropriate       INTERVENTIONS:  - Monitor oral intake, urinary output, labs, and treatment plans  - Assess nutrition and hydration status and recommend course of action  - Evaluate amount of meals eaten  - Assist patient with eating if necessary   - Allow adequate time for meals  - Recommend/ encourage appropriate diets, oral nutritional supplements, and vitamin/mineral supplements  - Order, calculate, and assess calorie counts as needed  - Recommend, monitor, and adjust tube feedings and TPN/PPN based on assessed needs  - Assess need for intravenous fluids  - Provide specific nutrition/hydration education as appropriate  - Include patient/family/caregiver in decisions related to nutrition  Outcome: Completed

## 2020-01-30 NOTE — ASSESSMENT & PLAN NOTE
Patient is not on [facility]  On aspirin  Will monitor on telemetry for now given new AMS     Rate controlled currently

## 2020-01-31 ENCOUNTER — TRANSITIONAL CARE MANAGEMENT (OUTPATIENT)
Dept: FAMILY MEDICINE CLINIC | Facility: CLINIC | Age: 85
End: 2020-01-31

## 2020-02-12 DIAGNOSIS — M25.511 ACUTE PAIN OF RIGHT SHOULDER: ICD-10-CM

## 2020-02-12 RX ORDER — HYDROCODONE BITARTRATE AND ACETAMINOPHEN 5; 325 MG/1; MG/1
TABLET ORAL
Qty: 90 TABLET | Refills: 0 | Status: SHIPPED | OUTPATIENT
Start: 2020-02-12 | End: 2020-03-20

## 2020-03-03 DIAGNOSIS — G62.9 PERIPHERAL NEUROPATHY: ICD-10-CM

## 2020-03-03 DIAGNOSIS — G89.4 CHRONIC PAIN SYNDROME: ICD-10-CM

## 2020-03-04 RX ORDER — CYCLOBENZAPRINE HCL 5 MG
TABLET ORAL
Qty: 90 TABLET | Refills: 0 | Status: SHIPPED | OUTPATIENT
Start: 2020-03-04 | End: 2020-04-01

## 2020-03-20 DIAGNOSIS — M25.511 ACUTE PAIN OF RIGHT SHOULDER: ICD-10-CM

## 2020-03-20 RX ORDER — HYDROCODONE BITARTRATE AND ACETAMINOPHEN 5; 325 MG/1; MG/1
TABLET ORAL
Qty: 90 TABLET | Refills: 0 | Status: SHIPPED | OUTPATIENT
Start: 2020-03-20 | End: 2020-06-02

## 2020-03-27 DIAGNOSIS — I16.0 HYPERTENSIVE URGENCY: ICD-10-CM

## 2020-03-27 RX ORDER — AMLODIPINE BESYLATE 10 MG/1
TABLET ORAL
Qty: 90 TABLET | Refills: 3 | Status: SHIPPED | OUTPATIENT
Start: 2020-03-27 | End: 2021-01-01 | Stop reason: SDUPTHER

## 2020-04-01 DIAGNOSIS — G62.9 PERIPHERAL NEUROPATHY: ICD-10-CM

## 2020-04-01 DIAGNOSIS — G89.4 CHRONIC PAIN SYNDROME: ICD-10-CM

## 2020-04-01 RX ORDER — CYCLOBENZAPRINE HCL 5 MG
TABLET ORAL
Qty: 90 TABLET | Refills: 0 | Status: SHIPPED | OUTPATIENT
Start: 2020-04-01 | End: 2020-06-02

## 2020-04-29 DIAGNOSIS — G25.81 RESTLESS LEG SYNDROME: ICD-10-CM

## 2020-04-29 DIAGNOSIS — I48.20 CHRONIC ATRIAL FIBRILLATION (HCC): ICD-10-CM

## 2020-04-29 RX ORDER — ROPINIROLE 2 MG/1
TABLET, FILM COATED ORAL
Qty: 90 TABLET | Refills: 3 | Status: SHIPPED | OUTPATIENT
Start: 2020-04-29 | End: 2020-07-22

## 2020-04-29 RX ORDER — AMIODARONE HYDROCHLORIDE 200 MG/1
TABLET ORAL
Qty: 90 TABLET | Refills: 3 | Status: SHIPPED | OUTPATIENT
Start: 2020-04-29 | End: 2020-07-22

## 2020-06-02 DIAGNOSIS — G89.4 CHRONIC PAIN SYNDROME: ICD-10-CM

## 2020-06-02 DIAGNOSIS — G62.9 PERIPHERAL NEUROPATHY: ICD-10-CM

## 2020-06-02 DIAGNOSIS — M25.511 ACUTE PAIN OF RIGHT SHOULDER: ICD-10-CM

## 2020-06-02 RX ORDER — HYDROCODONE BITARTRATE AND ACETAMINOPHEN 5; 325 MG/1; MG/1
TABLET ORAL
Qty: 90 TABLET | Refills: 0 | Status: SHIPPED | OUTPATIENT
Start: 2020-06-02 | End: 2020-07-22

## 2020-06-02 RX ORDER — CYCLOBENZAPRINE HCL 5 MG
TABLET ORAL
Qty: 90 TABLET | Refills: 0 | Status: SHIPPED | OUTPATIENT
Start: 2020-06-02

## 2020-07-19 DIAGNOSIS — F51.01 PRIMARY INSOMNIA: ICD-10-CM

## 2020-07-19 DIAGNOSIS — M25.511 ACUTE PAIN OF RIGHT SHOULDER: ICD-10-CM

## 2020-07-21 DIAGNOSIS — I48.20 CHRONIC ATRIAL FIBRILLATION (HCC): ICD-10-CM

## 2020-07-21 DIAGNOSIS — G25.81 RESTLESS LEG SYNDROME: ICD-10-CM

## 2020-07-22 RX ORDER — HYDROCODONE BITARTRATE AND ACETAMINOPHEN 5; 325 MG/1; MG/1
TABLET ORAL
Qty: 90 TABLET | Refills: 0 | Status: SHIPPED | OUTPATIENT
Start: 2020-07-22 | End: 2020-09-16 | Stop reason: SDUPTHER

## 2020-07-22 RX ORDER — ALPRAZOLAM 0.25 MG/1
TABLET ORAL
Qty: 60 TABLET | Refills: 0 | Status: SHIPPED | OUTPATIENT
Start: 2020-07-22 | End: 2020-09-03

## 2020-07-22 RX ORDER — AMIODARONE HYDROCHLORIDE 200 MG/1
TABLET ORAL
Qty: 90 TABLET | Refills: 3 | Status: SHIPPED | OUTPATIENT
Start: 2020-07-22 | End: 2021-01-01 | Stop reason: SDUPTHER

## 2020-07-22 RX ORDER — ROPINIROLE 2 MG/1
TABLET, FILM COATED ORAL
Qty: 90 TABLET | Refills: 3 | Status: SHIPPED | OUTPATIENT
Start: 2020-07-22 | End: 2021-01-01 | Stop reason: SDUPTHER

## 2020-09-03 DIAGNOSIS — F51.01 PRIMARY INSOMNIA: ICD-10-CM

## 2020-09-03 RX ORDER — ALPRAZOLAM 0.25 MG/1
TABLET ORAL
Qty: 60 TABLET | Refills: 1 | Status: SHIPPED | OUTPATIENT
Start: 2020-09-03 | End: 2020-11-24

## 2020-09-05 DIAGNOSIS — I16.0 HYPERTENSIVE URGENCY: ICD-10-CM

## 2020-09-07 RX ORDER — LISINOPRIL 40 MG/1
TABLET ORAL
Qty: 90 TABLET | Refills: 3 | Status: SHIPPED | OUTPATIENT
Start: 2020-09-07 | End: 2021-01-01

## 2020-09-16 ENCOUNTER — TELEMEDICINE (OUTPATIENT)
Dept: FAMILY MEDICINE CLINIC | Facility: CLINIC | Age: 85
End: 2020-09-16
Payer: MEDICARE

## 2020-09-16 DIAGNOSIS — M48.062 LUMBAR STENOSIS WITH NEUROGENIC CLAUDICATION: ICD-10-CM

## 2020-09-16 DIAGNOSIS — I10 ESSENTIAL HYPERTENSION: ICD-10-CM

## 2020-09-16 DIAGNOSIS — M25.511 ACUTE PAIN OF RIGHT SHOULDER: ICD-10-CM

## 2020-09-16 DIAGNOSIS — F11.20 CONTINUOUS OPIOID DEPENDENCE (HCC): Chronic | ICD-10-CM

## 2020-09-16 DIAGNOSIS — I48.0 PAROXYSMAL ATRIAL FIBRILLATION (HCC): Chronic | ICD-10-CM

## 2020-09-16 DIAGNOSIS — K27.9 PEPTIC ULCER DISEASE: ICD-10-CM

## 2020-09-16 DIAGNOSIS — Z00.00 MEDICARE ANNUAL WELLNESS VISIT, SUBSEQUENT: Primary | ICD-10-CM

## 2020-09-16 DIAGNOSIS — G93.40 ACUTE ENCEPHALOPATHY: ICD-10-CM

## 2020-09-16 DIAGNOSIS — Z91.89 AT RISK FOR DEHYDRATION DUE TO POOR FLUID INTAKE: ICD-10-CM

## 2020-09-16 DIAGNOSIS — K59.03 CONSTIPATION DUE TO PAIN MEDICATION: Chronic | ICD-10-CM

## 2020-09-16 PROBLEM — J18.9 PNEUMONIA DUE TO INFECTIOUS ORGANISM: Status: RESOLVED | Noted: 2020-01-26 | Resolved: 2020-09-16

## 2020-09-16 PROBLEM — N17.9 AKI (ACUTE KIDNEY INJURY) (HCC): Status: RESOLVED | Noted: 2019-05-07 | Resolved: 2020-09-16

## 2020-09-16 PROCEDURE — 99214 OFFICE O/P EST MOD 30 MIN: CPT | Performed by: FAMILY MEDICINE

## 2020-09-16 PROCEDURE — G0439 PPPS, SUBSEQ VISIT: HCPCS | Performed by: FAMILY MEDICINE

## 2020-09-16 RX ORDER — FAMOTIDINE 20 MG/1
20 TABLET, FILM COATED ORAL 2 TIMES DAILY
Qty: 180 TABLET | Refills: 3 | Status: SHIPPED | OUTPATIENT
Start: 2020-09-16 | End: 2021-01-01

## 2020-09-16 RX ORDER — SUCRALFATE 1 G/1
1 TABLET ORAL
Qty: 90 TABLET | Refills: 5 | Status: SHIPPED | OUTPATIENT
Start: 2020-09-16 | End: 2021-01-01

## 2020-09-16 RX ORDER — GABAPENTIN 100 MG/1
200 CAPSULE ORAL 2 TIMES DAILY
Qty: 120 CAPSULE | Refills: 5 | Status: SHIPPED | OUTPATIENT
Start: 2020-09-16 | End: 2021-01-01

## 2020-09-16 RX ORDER — HYDROCODONE BITARTRATE AND ACETAMINOPHEN 5; 325 MG/1; MG/1
1 TABLET ORAL EVERY 6 HOURS PRN
Qty: 120 TABLET | Refills: 0 | Status: SHIPPED | OUTPATIENT
Start: 2020-09-16 | End: 2020-10-20

## 2020-09-16 NOTE — ASSESSMENT & PLAN NOTE
Severe lumbar spinal stenosis  Not a surgical candidate  Remains on gabapentin and p r n  Vicodin  Increased dose of gabapentin to try to control her pain better    May need consultation with pain management however reluctant to have procedures done

## 2020-09-16 NOTE — ASSESSMENT & PLAN NOTE
- patient remains on amiodarone 200 mg  Last hospitalization she was in sinus rhythm  She is not on full anticoagulation given risk of bleeding and age    She is on aspirin

## 2020-09-16 NOTE — PROGRESS NOTES
Assessment and Plan:    Problem List Items Addressed This Visit        Digestive    Constipation due to pain medication (Chronic)     Chronic constipation and patient is also on narcotic pain medications  Continue on Colace 100 mg twice daily, MiraLax and refill provided of Linzess            Cardiovascular and Mediastinum    Essential hypertension     According to daughter her blood pressure remains stable  Continue on same dose of lisinopril 40 mg once daily         Paroxysmal atrial fibrillation (HCC) (Chronic)     - patient remains on amiodarone 200 mg  Last hospitalization she was in sinus rhythm  She is not on full anticoagulation given risk of bleeding and age  She is on aspirin            Other    Acute pain of right shoulder    Relevant Medications    HYDROcodone-acetaminophen (NORCO) 5-325 mg per tablet    Continuous opioid dependence (HCC) (Chronic)     Patient remains on p r n  Vicodin for spinal stenosis and chronic pain  Daughter monitors her use and sets up her medications  She has been completely compliant with taking pain medication         Lumbar stenosis with neurogenic claudication     Severe lumbar spinal stenosis  Not a surgical candidate  Remains on gabapentin and p r n  Vicodin  Increased dose of gabapentin to try to control her pain better  May need consultation with pain management however reluctant to have procedures done         Medicare annual wellness visit, subsequent - Primary     Subsequent annual visit completed  Patient does not qualify for recommended screenings based upon her age  Despite what the chart says she will be turning 80years of age next week    -daughter continues to take a minimalist approach to her health    Ultimately trying to keep her as comfortable as possible           Other Visit Diagnoses     Acute encephalopathy        Relevant Medications    gabapentin (NEURONTIN) 100 mg capsule    Poor PO intake / Early Satiety        Relevant Medications famotidine (PEPCID) 20 mg tablet    Peptic ulcer disease        Relevant Medications    sucralfate (CARAFATE) 1 g tablet    famotidine (PEPCID) 20 mg tablet        Health Maintenance Due   Topic Date Due    SLP PLAN OF CARE  09/20/1926    Medicare Annual Wellness Visit (AWV)  04/01/2020    BMI: Followup Plan  09/11/2020    BMI: Adult  01/29/2021         HPI:  Sosa Pittman is a 80 y o  female here for her Subsequent Wellness Visit      Patient Active Problem List   Diagnosis    Chronic pain    Postural dizziness with near syncope    Primary insomnia    Lumbar stenosis with neurogenic claudication    Acute pain of right shoulder    Arthritis of right shoulder region    Pain in joint of right elbow    Effusion of right shoulder joint    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)    Restless legs syndrome    Peripheral neuropathy    Mixed stress and urge urinary incontinence    Medicare annual wellness visit, subsequent    Constipation due to pain medication    Paroxysmal atrial fibrillation (HCC)    Chronic Chest and Abdominal Pain    Hyponatremia    Continuous opioid dependence (HCC)    Heart block    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    Essential hypertension    Acute metabolic encephalopathy     Past Medical History:   Diagnosis Date    Chest pain     last assessed-4/15/2013    Chronic pain     back    Gait disturbance     last assessed-2/12/2014    Hypertension     Hyponatremia     Hypoxemia     last assessed-1/5/2015    Melena     last assessed-2/23/2017    Pneumonia due to infectious organism 1/26/2020    Rotator cuff injury     Wheezing     last assessed-1/5/2015     Past Surgical History:   Procedure Laterality Date    APPENDECTOMY      BACK SURGERY      EYE SURGERY       Family History   Problem Relation Age of Onset    Heart attack Mother      Social History     Tobacco Use   Smoking Status Never Smoker   Smokeless Tobacco Never Used     Social History     Substance and Sexual Activity   Alcohol Use No      Social History     Substance and Sexual Activity   Drug Use No       Current Outpatient Medications   Medication Sig Dispense Refill    acetaminophen (TYLENOL) 325 mg tablet Take 2 tablets (650 mg total) by mouth every 6 (six) hours as needed for mild pain, headaches or fever 30 tablet 0    ALPRAZolam (XANAX) 0 25 mg tablet take 1 tablet twice a day if needed for ANXIETY OR SLEEP 60 tablet 1    amiodarone 200 mg tablet take 1/2 tablet by mouth once daily 90 tablet 3    amLODIPine (NORVASC) 10 mg tablet take 1 tablet by mouth once daily 90 tablet 3    aspirin 81 MG tablet Take 81 mg by mouth 2 (two) times a day      cyclobenzaprine (FLEXERIL) 5 mg tablet take 1 tablet BY MOUTH three times a day if needed for MUSCLE SPASMS 90 tablet 0    dorzolamide-timolol (COSOPT) 22 3-6 8 MG/ML ophthalmic solution Administer 1 drop into the left eye 2 (two) times a day      famotidine (PEPCID) 20 mg tablet Take 1 tablet (20 mg total) by mouth 2 (two) times a day 180 tablet 3    gabapentin (NEURONTIN) 100 mg capsule Take 2 capsules (200 mg total) by mouth 2 (two) times a day 120 capsule 5    hydrALAZINE (APRESOLINE) 10 mg tablet Take 1 tablet (10 mg total) by mouth 2 (two) times a day as needed (For SBP>160 or DBP>90 consistently, start taking this medication  ) 180 tablet 0    HYDROcodone-acetaminophen (NORCO) 5-325 mg per tablet Take 1 tablet by mouth every 6 (six) hours as needed for painMax Daily Amount: 4 tablets 120 tablet 0    lisinopril (ZESTRIL) 20 mg tablet Take 1 tablet (20 mg total) by mouth daily at bedtime as needed (If systolic blood pressure more than 160) 30 tablet 0    lisinopril (ZESTRIL) 40 mg tablet take 1 tablet by mouth once daily 90 tablet 3    meclizine (ANTIVERT) 25 mg tablet Take 1 tablet (25 mg total) by mouth every 8 (eight) hours as needed for dizziness 30 tablet 0    Multiple Vitamins-Minerals (ICAPS AREDS 2 PO) Take 1 tablet by mouth 2 (two) times a day      Omega-3 Fatty Acids (FISH OIL) 1,000 mg Take 1,000 mg by mouth 2 (two) times a day      rOPINIRole (REQUIP) 2 mg tablet take 1 tablet at bedtime 90 tablet 3    sucralfate (CARAFATE) 1 g tablet Take 1 tablet (1 g total) by mouth 3 (three) times a day before meals 90 tablet 5    traZODone (DESYREL) 50 mg tablet take 1 tablet by mouth at bedtime 90 tablet 3     No current facility-administered medications for this visit  Allergies   Allergen Reactions    Chlorthalidone Other (See Comments)     Severe Hyponatremia (sodium 115)    Morphine GI Intolerance     "acts drunk"       There is no immunization history on file for this patient  Patient Care Team:  Radha Pollack DO as PCP - General  MD Radha Darling DO Matt Shed, MD Darl Sandy, MD (Ophthalmology)    Medicare Screening Tests and Risk Assessments:  Bethel Francis is here for her Subsequent Wellness visit  Last Medicare Wellness visit information reviewed, patient interviewed, no change since last AWV  Health Risk Assessment:   Patient rates overall health as poor  Patient feels that their physical health rating is Much worse  Eyesight was rated as Much worse  Hearing was rated as Slightly worse  Patient feels that their emotional and mental health rating is Same  Pain experienced in the last 7 days has been A lot  Patient states that she has experienced no weight loss or gain in last 6 months  Depression Screening:   PHQ-2 Score: 2      Fall Risk Screening: In the past year, patient has experienced: no history of falling in past year      Urinary Incontinence Screening:   Patient has leaked urine accidently in the last six months  Home Safety:  Patient does not have trouble with stairs inside or outside of their home  Patient has working smoke alarms and has no working carbon monoxide detector  Home safety hazards include: none  Nutrition:   Current diet is Regular  Medications:   Patient is currently taking over-the-counter supplements  OTC medications include: see medication list  Patient is not able to manage medications  Activities of Daily Living (ADLs)/Instrumental Activities of Daily Living (IADLs):   Walk and transfer into and out of bed and chair?: No  Dress and groom yourself?: No    Bathe or shower yourself?: No    Feed yourself? Yes  Do your laundry/housekeeping?: No  Manage your money, pay your bills and track your expenses?: No  Make your own meals?: No    Do your own shopping?: No    Previous Hospitalizations:   Any hospitalizations or ED visits within the last 12 months?: No      Advance Care Planning:   Living will: Yes    Durable POA for healthcare: Yes    Advanced directive: Yes    Advanced directive counseling given: No    Five wishes given: No    Patient declined ACP directive: No    End of Life Decisions reviewed with patient: Yes    Provider agrees with end of life decisions: Yes      PREVENTIVE SCREENINGS      Cardiovascular Screening:    General: Screening Current      Diabetes Screening:     General: Screening Current      Colorectal Cancer Screening:     General: Screening Not Indicated      Breast Cancer Screening:     General: Screening Not Indicated      Cervical Cancer Screening:    General: Screening Not Indicated      Osteoporosis Screening:    General: Screening Not Indicated      Abdominal Aortic Aneurysm (AAA) Screening:        General: Screening Not Indicated      Lung Cancer Screening:     General: Screening Not Indicated      Hepatitis C Screening:    General: Risks and Benefits Discussed, Patient Declines and Screening Not Indicated      BMI Counseling: There is no height or weight on file to calculate BMI  The BMI is above normal  Exercise recommendations include obtaining a gym membership  BMI Counseling: There is no height or weight on file to calculate BMI   Follow-up plan was not completed due to elderly patient (65+ years old) where weight reduction/weight gain would complicate underlying health condition such as: illness or physical disability  Virtual AWV Consent    Reason for visit is annual wellness visit    Encounter provider Kristin Yu DO    Provider located at [address] [place], [place] [id number]      Recent Visits  No visits were found meeting these conditions  Showing recent visits within past 7 days and meeting all other requirements     Today's Visits  Date Type Provider Dept   09/16/20 Telemedicine Kristin Yu DO [facility]   Showing today's visits and meeting all other requirements     Future Appointments  No visits were found meeting these conditions  Showing future appointments within next 150 days and meeting all other requirements        After connecting through [employer], the patient was identified by name and date of birth  Shanice Reddy was informed that this is a telemedicine visit and that the visit is being conducted through [employer] and patient was informed that this is not a secure, HIPAA-complaint platform  She agrees to proceed  My office door was closed  No one else was in the room  She acknowledged consent and understanding of privacy and security of the video platform  The patient has agreed to participate and understands they can discontinue the visit at any time    Patient is aware this is a billable service  Subjective:      Patient ID: Shanice Reddy is a 80 y o  female  Video virtual visit for subsequent annual wellness visit and follow-up of chronic conditions  Patient was hospitalized in January with acute encephalopathy  She was diagnosed with a pneumonia at that time but also had her dosage of gabapentin decreased significantly as it was felt that this may have been contributing to her encephalopathy  Her daughter is translating for the visit  Patient continues to complain of severe back pain    Patient does have history of severe spinal stenosis but is not a surgical candidate due to comorbid conditions as well as age  She will be turning 80years old next week  Daughter is her primary caregiver and she does take extremely good care of her mother  We had increased her dosage of gabapentin due to her largely intractable back pain  She is having to take p r n  Vicodin regularly to help control the pain  She has had consultation with pain management but once again is limited for intervention based upon chronic conditions as well as her age  She does have history of chronic constipation which is compounded by her narcotic pain medication  Daughter does give her Colace as well as MiraLax in addition to [address]  She does need refill of Linzess  Past Medical History:   Diagnosis Date    Chest pain     last assessed-4/15/2013    Chronic pain     back    Gait disturbance     last assessed-2/12/2014    Hypertension     Hyponatremia     Hypoxemia     last assessed-1/5/2015    Melena     last assessed-2/23/2017    Pneumonia due to infectious organism 1/26/2020    Rotator cuff injury     Wheezing     last assessed-1/5/2015       Family History   Problem Relation Age of Onset    Heart attack Mother        Past Surgical History:   Procedure Laterality Date    APPENDECTOMY      BACK SURGERY      EYE SURGERY          reports that she has never smoked  She has never used smokeless tobacco  She reports that she does not drink alcohol or use drugs        Current Outpatient Medications:     acetaminophen (TYLENOL) 325 mg tablet, Take 2 tablets (650 mg total) by mouth every 6 (six) hours as needed for mild pain, headaches or fever, Disp: 30 tablet, Rfl: 0    ALPRAZolam (XANAX) 0 25 mg tablet, take 1 tablet twice a day if needed for ANXIETY OR SLEEP, Disp: 60 tablet, Rfl: 1    amiodarone 200 mg tablet, take 1/2 tablet by mouth once daily, Disp: 90 tablet, Rfl: 3    amLODIPine (NORVASC) 10 mg tablet, take 1 tablet by mouth once daily, Disp: 90 tablet, Rfl: 3    aspirin 81 MG tablet, Take 81 mg by mouth 2 (two) times a day, Disp: , Rfl:     cyclobenzaprine (FLEXERIL) 5 mg tablet, take 1 tablet BY MOUTH three times a day if needed for MUSCLE SPASMS, Disp: 90 tablet, Rfl: 0    dorzolamide-timolol (COSOPT) 22 3-6 8 MG/ML ophthalmic solution, Administer 1 drop into the left eye 2 (two) times a day, Disp: , Rfl:     famotidine (PEPCID) 20 mg tablet, Take 1 tablet (20 mg total) by mouth 2 (two) times a day, Disp: 180 tablet, Rfl: 3    gabapentin (NEURONTIN) 100 mg capsule, Take 2 capsules (200 mg total) by mouth 2 (two) times a day, Disp: 120 capsule, Rfl: 5    hydrALAZINE (APRESOLINE) 10 mg tablet, Take 1 tablet (10 mg total) by mouth 2 (two) times a day as needed (For SBP>160 or DBP>90 consistently, start taking this medication ), Disp: 180 tablet, Rfl: 0    HYDROcodone-acetaminophen (NORCO) 5-325 mg per tablet, Take 1 tablet by mouth every 6 (six) hours as needed for painMax Daily Amount: 4 tablets, Disp: 120 tablet, Rfl: 0    lisinopril (ZESTRIL) 20 mg tablet, Take 1 tablet (20 mg total) by mouth daily at bedtime as needed (If systolic blood pressure more than 160), Disp: 30 tablet, Rfl: 0    lisinopril (ZESTRIL) 40 mg tablet, take 1 tablet by mouth once daily, Disp: 90 tablet, Rfl: 3    meclizine (ANTIVERT) 25 mg tablet, Take 1 tablet (25 mg total) by mouth every 8 (eight) hours as needed for dizziness, Disp: 30 tablet, Rfl: 0    Multiple Vitamins-Minerals (ICAPS AREDS 2 PO), Take 1 tablet by mouth 2 (two) times a day, Disp: , Rfl:     Omega-3 Fatty Acids (FISH OIL) 1,000 mg, Take 1,000 mg by mouth 2 (two) times a day, Disp: , Rfl:     rOPINIRole (REQUIP) 2 mg tablet, take 1 tablet at bedtime, Disp: 90 tablet, Rfl: 3    sucralfate (CARAFATE) 1 g tablet, Take 1 tablet (1 g total) by mouth 3 (three) times a day before meals, Disp: 90 tablet, Rfl: 5    traZODone (DESYREL) 50 mg tablet, take 1 tablet by mouth at bedtime, Disp: 90 tablet, Rfl: 3    The following portions of the patient's history were reviewed and updated as appropriate: allergies, current medications, past family history, past medical history, past social history, past surgical history and problem list     Review of Systems   Constitutional: Negative  HENT: Negative  Eyes: Negative  Respiratory: Negative  Cardiovascular: Negative  Negative for leg swelling  Gastrointestinal: Positive for constipation  Endocrine: Negative  Genitourinary: Negative  Occasional incontinence of urine   Musculoskeletal: Positive for arthralgias, back pain and gait problem (Related to back pain)  Skin: Negative  Allergic/Immunologic: Negative  Hematological: Negative  Psychiatric/Behavioral: Negative  Objective: There were no vitals taken for this visit  Physical Exam  Vitals signs and nursing note reviewed  Constitutional:       Appearance: Normal appearance  She is well-developed  HENT:      Head: Normocephalic and atraumatic  Eyes:      Conjunctiva/sclera: Conjunctivae normal       Pupils: Pupils are equal, round, and reactive to light  Pulmonary:      Effort: Pulmonary effort is normal  No respiratory distress  Neurological:      General: No focal deficit present  Mental Status: She is alert  Psychiatric:         Behavior: Behavior normal          Thought Content: Thought content normal          Judgment: Judgment normal            No results found for this or any previous visit (from the past 1008 hour(s))  Assessment/Plan:    Constipation due to pain medication  Chronic constipation and patient is also on narcotic pain medications  Continue on Colace 100 mg twice daily, MiraLax and refill provided of Linzess    Essential hypertension  According to daughter her blood pressure remains stable    Continue on same dose of lisinopril 40 mg once daily    Paroxysmal atrial fibrillation (HCC)  - patient remains on amiodarone 200 mg  Last hospitalization she was in sinus rhythm  She is not on full anticoagulation given risk of bleeding and age  She is on aspirin    Continuous opioid dependence [facility])  Patient remains on p r n  Vicodin for spinal stenosis and chronic pain  Daughter monitors her use and sets up her medications  She has been completely compliant with taking pain medication    Chronic pain  Patient is still having significant pain in her back and legs  She does have severe spinal stenosis with associated peripheral neuropathy  She is not a surgical candidate  Her dose of gabapentin was decreased while in the hospital due to mental status change which could have been related to pneumonia or possibly related to her increased dose of gabapentin  100 mg of gabapentin twice daily does not seem to be sufficient for pain control  Increase to 200 mg twice daily and also provided additional p r n  Vicodin for her daughter to be able to use for worsening breakthrough pain    Prior to prescribing the controlled substance, a patient search was performed on the [place] prescription drug monitoring program web site  There was no evidence of diversion or misuse  Prescription provided    Lumbar stenosis with neurogenic claudication  Severe lumbar spinal stenosis  Not a surgical candidate  Remains on gabapentin and p r n  Vicodin  Increased dose of gabapentin to try to control her pain better  May need consultation with pain management however reluctant to have procedures done    Medicare annual wellness visit, subsequent  Subsequent annual visit completed  Patient does not qualify for recommended screenings based upon her age  Despite what the chart says she will be turning 80years of age next week    -daughter continues to take a minimalist approach to her health    Ultimately trying to keep her as comfortable as possible          Problem List Items Addressed This Visit        Digestive Constipation due to pain medication (Chronic)     Chronic constipation and patient is also on narcotic pain medications  Continue on Colace 100 mg twice daily, MiraLax and refill provided of Linzess            Cardiovascular and Mediastinum    Essential hypertension     According to daughter her blood pressure remains stable  Continue on same dose of lisinopril 40 mg once daily         Paroxysmal atrial fibrillation (HCC) (Chronic)     - patient remains on amiodarone 200 mg  Last hospitalization she was in sinus rhythm  She is not on full anticoagulation given risk of bleeding and age  She is on aspirin            Other    Acute pain of right shoulder    Relevant Medications    HYDROcodone-acetaminophen (NORCO) 5-325 mg per tablet    Continuous opioid dependence (HCC) (Chronic)     Patient remains on p r n  Vicodin for spinal stenosis and chronic pain  Daughter monitors her use and sets up her medications  She has been completely compliant with taking pain medication         Lumbar stenosis with neurogenic claudication     Severe lumbar spinal stenosis  Not a surgical candidate  Remains on gabapentin and p r n  Vicodin  Increased dose of gabapentin to try to control her pain better  May need consultation with pain management however reluctant to have procedures done         Medicare annual wellness visit, subsequent - Primary     Subsequent annual visit completed  Patient does not qualify for recommended screenings based upon her age  Despite what the chart says she will be turning 80years of age next week    -daughter continues to take a minimalist approach to her health    Ultimately trying to keep her as comfortable as possible           Other Visit Diagnoses     Acute encephalopathy        Relevant Medications    gabapentin (NEURONTIN) 100 mg capsule    Poor PO intake / Early Satiety        Relevant Medications    famotidine (PEPCID) 20 mg tablet    Peptic ulcer disease        Relevant Medications sucralfate (CARAFATE) 1 g tablet    famotidine (PEPCID) 20 mg tablet

## 2020-09-16 NOTE — ASSESSMENT & PLAN NOTE
Chronic constipation and patient is also on narcotic pain medications    Continue on Colace 100 mg twice daily, MiraLax and refill provided of Linzess

## 2020-09-16 NOTE — ASSESSMENT & PLAN NOTE
Patient remains on p r n  Vicodin for spinal stenosis and chronic pain  Daughter monitors her use and sets up her medications    She has been completely compliant with taking pain medication

## 2020-09-16 NOTE — ASSESSMENT & PLAN NOTE
According to daughter her blood pressure remains stable    Continue on same dose of lisinopril 40 mg once daily

## 2020-09-16 NOTE — PATIENT INSTRUCTIONS
Medicare Preventive Visit Patient Instructions  Thank you for completing your Welcome to Medicare Visit or Medicare Annual Wellness Visit today  Your next wellness visit will be due in one year (9/16/2021)  The screening/preventive services that you may require over the next 5-10 years are detailed below  Some tests may not apply to you based off risk factors and/or age  Screening tests ordered at today's visit but not completed yet may show as past due  Also, please note that scanned in results may not display below  Preventive Screenings:  Service Recommendations Previous Testing/Comments   Colorectal Cancer Screening  * Colonoscopy    * Fecal Occult Blood Test (FOBT)/Fecal Immunochemical Test (FIT)  * Fecal DNA/Cologuard Test  * Flexible Sigmoidoscopy Age: 54-65 years old   Colonoscopy: every 10 years (may be performed more frequently if at higher risk)  OR  FOBT/FIT: every 1 year  OR  Cologuard: every 3 years  OR  Sigmoidoscopy: every 5 years  Screening may be recommended earlier than age 48 if at higher risk for colorectal cancer  Also, an individualized decision between you and your healthcare provider will decide whether screening between the ages of 74-80 would be appropriate  Colonoscopy: Not on file  FOBT/FIT: 05/07/2019  Cologuard: Not on file  Sigmoidoscopy: Not on file    Screening Not Indicated     Breast Cancer Screening Age: 36 years old  Frequency: every 1-2 years  Not required if history of left and right mastectomy Mammogram: Not on file    Screening Not Indicated   Cervical Cancer Screening Between the ages of 21-29, pap smear recommended once every 3 years  Between the ages of 33-67, can perform pap smear with HPV co-testing every 5 years     Recommendations may differ for women with a history of total hysterectomy, cervical cancer, or abnormal pap smears in past  Pap Smear: Not on file    Screening Not Indicated   Hepatitis C Screening Once for adults born between [facility] frequently in patients at high risk for Hepatitis C Hep C Antibody: Not on file    Risks and Benefits Discussed  Patient Declines  Screening Not Indicated   Diabetes Screening 1-2 times per year if you're at risk for diabetes or have pre-diabetes Fasting glucose: 90 mg/dL   A1C: No results in last 5 years    Screening Current   Cholesterol Screening Once every 5 years if you don't have a lipid disorder  May order more often based on risk factors  Lipid panel: 10/23/2017    Screening Current     Other Preventive Screenings Covered by Medicare:  1  Abdominal Aortic Aneurysm (AAA) Screening: covered once if your at risk  You're considered to be at risk if you have a family history of AAA  2  Lung Cancer Screening: covers low dose CT scan once per year if you meet all of the following conditions: (1) Age 50-69; (2) No signs or symptoms of lung cancer; (3) Current smoker or have quit smoking within the last 15 years; (4) You have a tobacco smoking history of at least 30 pack years (packs per day multiplied by number of years you smoked); (5) You get a written order from a healthcare provider  3  Glaucoma Screening: covered annually if you're considered high risk: (1) You have diabetes OR (2) Family history of glaucoma OR (3)  aged 48 and older OR (3)  American aged 72 and older  3  Osteoporosis Screening: covered every 2 years if you meet one of the following conditions: (1) You're estrogen deficient and at risk for osteoporosis based off medical history and other findings; (2) Have a vertebral abnormality; (3) On glucocorticoid therapy for more than 3 months; (4) Have primary hyperparathyroidism; (5) On osteoporosis medications and need to assess response to drug therapy  · Last bone density test (DXA Scan): Not on file  5  HIV Screening: covered annually if you're between the age of 12-76   Also covered annually if you are younger than 13 and older than 72 with risk factors for HIV infection  For pregnant patients, it is covered up to 3 times per pregnancy  Immunizations:  Immunization Recommendations   Influenza Vaccine Annual influenza vaccination during flu season is recommended for all persons aged >= 6 months who do not have contraindications   Pneumococcal Vaccine (Prevnar and Pneumovax)  * Prevnar = PCV13  * Pneumovax = PPSV23   Adults 25-60 years old: 1-3 doses may be recommended based on certain risk factors  Adults 72 years old: Prevnar (PCV13) vaccine recommended followed by Pneumovax (PPSV23) vaccine  If already received PPSV23 since turning 65, then PCV13 recommended at least one year after PPSV23 dose  Hepatitis B Vaccine 3 dose series if at intermediate or high risk (ex: diabetes, end stage renal disease, liver disease)   Tetanus (Td) Vaccine - COST NOT COVERED BY MEDICARE PART B Following completion of primary series, a booster dose should be given every 10 years to maintain immunity against tetanus  Td may also be given as tetanus wound prophylaxis  Tdap Vaccine - COST NOT COVERED BY MEDICARE PART B Recommended at least once for all adults  For pregnant patients, recommended with each pregnancy  Shingles Vaccine (Shingrix) - COST NOT COVERED BY MEDICARE PART B  2 shot series recommended in those aged 48 and above     Health Maintenance Due:  There are no preventive care reminders to display for this patient  Immunizations Due:  There are no preventive care reminders to display for this patient  Advance Directives   What are advance directives? Advance directives are legal documents that state your wishes and plans for medical care  These plans are made ahead of time in case you lose your ability to make decisions for yourself  Advance directives can apply to any medical decision, such as the treatments you want, and if you want to donate organs  What are the types of advance directives?   There are many types of advance directives, and each state has rules about how to use them  You may choose a combination of any of the following:  · Living will: This is a written record of the treatment you want  You can also choose which treatments you do not want, which to limit, and which to stop at a certain time  This includes surgery, medicine, IV fluid, and tube feedings  · Durable power of  for healthcare [facility]): This is a written record that states who you want to make healthcare choices for you when you are unable to make them for yourself  This person, called a proxy, is usually a family member or a friend  You may choose more than 1 proxy  · Do not resuscitate (DNR) order:  A DNR order is used in case your heart stops beating or you stop breathing  It is a request not to have certain forms of treatment, such as CPR  A DNR order may be included in other types of advance directives  · Medical directive: This covers the care that you want if you are in a coma, near death, or unable to make decisions for yourself  You can list the treatments you want for each condition  Treatment may include pain medicine, surgery, blood transfusions, dialysis, IV or tube feedings, and a ventilator (breathing machine)  · Values history: This document has questions about your views, beliefs, and how you feel and think about life  This information can help others choose the care that you would choose  Why are advance directives important? An advance directive helps you control your care  Although spoken wishes may be used, it is better to have your wishes written down  Spoken wishes can be misunderstood, or not followed  Treatments may be given even if you do not want them  An advance directive may make it easier for your family to make difficult choices about your care  Urinary Incontinence   Urinary incontinence (UI)  is when you lose control of your bladder  UI develops because your bladder cannot store or empty urine properly   The 3 most common types of UI are stress incontinence, urge incontinence, or both  Medicines:   · May be given to help strengthen your bladder control  Report any side effects of medication to your healthcare provider  Do pelvic muscle exercises often:  Your pelvic muscles help you stop urinating  Squeeze these muscles tight for 5 seconds, then relax for 5 seconds  Gradually work up to squeezing for 10 seconds  Do 3 sets of 15 repetitions a day, or as directed  This will help strengthen your pelvic muscles and improve bladder control  Train your bladder:  Go to the bathroom at set times, such as every 2 hours, even if you do not feel the urge to go  You can also try to hold your urine when you feel the urge to go  For example, hold your urine for 5 minutes when you feel the urge to go  As that becomes easier, hold your urine for 10 minutes  Self-care:   · Keep a UI record  Write down how often you leak urine and how much you leak  Make a note of what you were doing when you leaked urine  · Drink liquids as directed  You may need to limit the amount of liquid you drink to help control your urine leakage  Do not drink any liquid right before you go to bed  Limit or do not have drinks that contain caffeine or alcohol  · Prevent constipation  Eat a variety of high-fiber foods  Good examples are high-fiber cereals, beans, vegetables, and whole-grain breads  Walking is the best way to trigger your intestines to have a bowel movement  · Exercise regularly and maintain a healthy weight  Weight loss and exercise will decrease pressure on your bladder and help you control your leakage  · Use a catheter as directed  to help empty your bladder  A catheter is a tiny, plastic tube that is put into your bladder to drain your urine  · Go to behavior therapy as directed  Behavior therapy may be used to help you learn to control your urge to urinate      Weight Management   Why it is important to manage your weight:  Being overweight increases your risk of health conditions such as heart disease, high blood pressure, type 2 diabetes, and certain types of cancer  It can also increase your risk for osteoarthritis, sleep apnea, and other respiratory problems  Aim for a slow, steady weight loss  Even a small amount of weight loss can lower your risk of health problems  How to lose weight safely:  A safe and healthy way to lose weight is to eat fewer calories and get regular exercise  You can lose up about 1 pound a week by decreasing the number of calories you eat by 500 calories each day  Healthy meal plan for weight management:  A healthy meal plan includes a variety of foods, contains fewer calories, and helps you stay healthy  A healthy meal plan includes the following:  · Eat whole-grain foods more often  A healthy meal plan should contain fiber  Fiber is the part of grains, fruits, and vegetables that is not broken down by your body  Whole-grain foods are healthy and provide extra fiber in your diet  Some examples of whole-grain foods are whole-wheat breads and pastas, oatmeal, brown rice, and bulgur  · Eat a variety of vegetables every day  Include dark, leafy greens such as spinach, kale, dominic greens, and mustard greens  Eat yellow and orange vegetables such as carrots, sweet potatoes, and winter squash  · Eat a variety of fruits every day  Choose fresh or canned fruit (canned in its own juice or light syrup) instead of juice  Fruit juice has very little or no fiber  · Eat low-fat dairy foods  Drink fat-free (skim) milk or 1% milk  Eat fat-free yogurt and low-fat cottage cheese  Try low-fat cheeses such as mozzarella and other reduced-fat cheeses  · Choose meat and other protein foods that are low in fat  Choose beans or other legumes such as split peas or lentils  Choose fish, skinless poultry (chicken or turkey), or lean cuts of red meat (beef or pork)  Before you cook meat or poultry, cut off any visible fat  · Use less fat and oil    Try baking foods instead of frying them  Add less fat, such as margarine, sour cream, regular salad dressing and mayonnaise to foods  Eat fewer high-fat foods  Some examples of high-fat foods include french fries, doughnuts, ice cream, and cakes  · Eat fewer sweets  Limit foods and drinks that are high in sugar  This includes candy, cookies, regular soda, and sweetened drinks  Exercise:  Exercise at least 30 minutes per day on most days of the week  Some examples of exercise include walking, biking, dancing, and swimming  You can also fit in more physical activity by taking the stairs instead of the elevator or parking farther away from stores  Ask your healthcare provider about the best exercise plan for you  © Copyright [employer] 2018 Information is for End User's use only and may not be sold, redistributed or otherwise used for commercial purposes  All illustrations and images included in CareNotes® are the copyrighted property of [employer]  or [facility] Preventive Visit Patient Instructions  Thank you for completing your Welcome to Medicare Visit or Medicare Annual Wellness Visit today  Your next wellness visit will be due in one year (9/16/2021)  The screening/preventive services that you may require over the next 5-10 years are detailed below  Some tests may not apply to you based off risk factors and/or age  Screening tests ordered at today's visit but not completed yet may show as past due  Also, please note that scanned in results may not display below    Preventive Screenings:  Service Recommendations Previous Testing/Comments   Colorectal Cancer Screening  * Colonoscopy    * Fecal Occult Blood Test (FOBT)/Fecal Immunochemical Test (FIT)  * Fecal DNA/Cologuard Test  * Flexible Sigmoidoscopy Age: 54-65 years old   Colonoscopy: every 10 years (may be performed more frequently if at higher risk)  OR  FOBT/FIT: every 1 year  OR  Cologuard: every 3 years  OR  Sigmoidoscopy: every 5 years  Screening may be recommended earlier than age 48 if at higher risk for colorectal cancer  Also, an individualized decision between you and your healthcare provider will decide whether screening between the ages of 74-80 would be appropriate  Colonoscopy: Not on file  FOBT/FIT: 05/07/2019  Cologuard: Not on file  Sigmoidoscopy: Not on file    Screening Not Indicated     Breast Cancer Screening Age: 36 years old  Frequency: every 1-2 years  Not required if history of left and right mastectomy Mammogram: Not on file    Screening Not Indicated   Cervical Cancer Screening Between the ages of 21-29, pap smear recommended once every 3 years  Between the ages of 33-67, can perform pap smear with HPV co-testing every 5 years  Recommendations may differ for women with a history of total hysterectomy, cervical cancer, or abnormal pap smears in past  Pap Smear: Not on file    Screening Not Indicated   Hepatitis C Screening Once for adults born between 1945 and 1965  More frequently in patients at high risk for Hepatitis C Hep C Antibody: Not on file    Risks and Benefits Discussed  Patient Declines  Screening Not Indicated   Diabetes Screening 1-2 times per year if you're at risk for diabetes or have pre-diabetes Fasting glucose: 90 mg/dL   A1C: No results in last 5 years    Screening Current   Cholesterol Screening Once every 5 years if you don't have a lipid disorder  May order more often based on risk factors  Lipid panel: 10/23/2017    Screening Current     Other Preventive Screenings Covered by Medicare:  6  Abdominal Aortic Aneurysm (AAA) Screening: covered once if your at risk  You're considered to be at risk if you have a family history of AAA    7  Lung Cancer Screening: covers low dose CT scan once per year if you meet all of the following conditions: (1) Age 50-69; (2) No signs or symptoms of lung cancer; (3) Current smoker or have quit smoking within the last 15 years; (4) You have a tobacco smoking history of at least 30 pack years (packs per day multiplied by number of years you smoked); (5) You get a written order from a healthcare provider  8  Glaucoma Screening: covered annually if you're considered high risk: (1) You have diabetes OR (2) Family history of glaucoma OR (3)  aged 48 and older OR (3)  American aged 72 and older  5  Osteoporosis Screening: covered every 2 years if you meet one of the following conditions: (1) You're estrogen deficient and at risk for osteoporosis based off medical history and other findings; (2) Have a vertebral abnormality; (3) On glucocorticoid therapy for more than 3 months; (4) Have primary hyperparathyroidism; (5) On osteoporosis medications and need to assess response to drug therapy  · Last bone density test (DXA Scan): Not on file  10  HIV Screening: covered annually if you're between the age of 12-76  Also covered annually if you are younger than 13 and older than 72 with risk factors for HIV infection  For pregnant patients, it is covered up to 3 times per pregnancy  Immunizations:  Immunization Recommendations   Influenza Vaccine Annual influenza vaccination during flu season is recommended for all persons aged >= 6 months who do not have contraindications   Pneumococcal Vaccine (Prevnar and Pneumovax)  * Prevnar = PCV13  * Pneumovax = PPSV23   Adults 25-60 years old: 1-3 doses may be recommended based on certain risk factors  Adults 72 years old: Prevnar (PCV13) vaccine recommended followed by Pneumovax (PPSV23) vaccine  If already received PPSV23 since turning 65, then PCV13 recommended at least one year after PPSV23 dose  Hepatitis B Vaccine 3 dose series if at intermediate or high risk (ex: diabetes, end stage renal disease, liver disease)   Tetanus (Td) Vaccine - COST NOT COVERED BY MEDICARE PART B Following completion of primary series, a booster dose should be given every 10 years to maintain immunity against tetanus  Td may also be given as tetanus wound prophylaxis  Tdap Vaccine - COST NOT COVERED BY MEDICARE PART B Recommended at least once for all adults  For pregnant patients, recommended with each pregnancy  Shingles Vaccine (Shingrix) - COST NOT COVERED BY MEDICARE PART B  2 shot series recommended in those aged 48 and above     Health Maintenance Due:  There are no preventive care reminders to display for this patient  Immunizations Due:  There are no preventive care reminders to display for this patient  Advance Directives   What are advance directives? Advance directives are legal documents that state your wishes and plans for medical care  These plans are made ahead of time in case you lose your ability to make decisions for yourself  Advance directives can apply to any medical decision, such as the treatments you want, and if you want to donate organs  What are the types of advance directives? There are many types of advance directives, and each state has rules about how to use them  You may choose a combination of any of the following:  · Living will: This is a written record of the treatment you want  You can also choose which treatments you do not want, which to limit, and which to stop at a certain time  This includes surgery, medicine, IV fluid, and tube feedings  · Durable power of  for healthcare [facility]): This is a written record that states who you want to make healthcare choices for you when you are unable to make them for yourself  This person, called a proxy, is usually a family member or a friend  You may choose more than 1 proxy  · Do not resuscitate (DNR) order:  A DNR order is used in case your heart stops beating or you stop breathing  It is a request not to have certain forms of treatment, such as CPR  A DNR order may be included in other types of advance directives  · Medical directive:   This covers the care that you want if you are in a coma, near death, or unable to make decisions for yourself  You can list the treatments you want for each condition  Treatment may include pain medicine, surgery, blood transfusions, dialysis, IV or tube feedings, and a ventilator (breathing machine)  · Values history: This document has questions about your views, beliefs, and how you feel and think about life  This information can help others choose the care that you would choose  Why are advance directives important? An advance directive helps you control your care  Although spoken wishes may be used, it is better to have your wishes written down  Spoken wishes can be misunderstood, or not followed  Treatments may be given even if you do not want them  An advance directive may make it easier for your family to make difficult choices about your care  Urinary Incontinence   Urinary incontinence (UI)  is when you lose control of your bladder  UI develops because your bladder cannot store or empty urine properly  The 3 most common types of UI are stress incontinence, urge incontinence, or both  Medicines:   · May be given to help strengthen your bladder control  Report any side effects of medication to your healthcare provider  Do pelvic muscle exercises often:  Your pelvic muscles help you stop urinating  Squeeze these muscles tight for 5 seconds, then relax for 5 seconds  Gradually work up to squeezing for 10 seconds  Do 3 sets of 15 repetitions a day, or as directed  This will help strengthen your pelvic muscles and improve bladder control  Train your bladder:  Go to the bathroom at set times, such as every 2 hours, even if you do not feel the urge to go  You can also try to hold your urine when you feel the urge to go  For example, hold your urine for 5 minutes when you feel the urge to go  As that becomes easier, hold your urine for 10 minutes  Self-care:   · Keep a UI record  Write down how often you leak urine and how much you leak   Make a note of what you were doing when you leaked urine   · Drink liquids as directed  You may need to limit the amount of liquid you drink to help control your urine leakage  Do not drink any liquid right before you go to bed  Limit or do not have drinks that contain caffeine or alcohol  · Prevent constipation  Eat a variety of high-fiber foods  Good examples are high-fiber cereals, beans, vegetables, and whole-grain breads  Walking is the best way to trigger your intestines to have a bowel movement  · Exercise regularly and maintain a healthy weight  Weight loss and exercise will decrease pressure on your bladder and help you control your leakage  · Use a catheter as directed  to help empty your bladder  A catheter is a tiny, plastic tube that is put into your bladder to drain your urine  · Go to behavior therapy as directed  Behavior therapy may be used to help you learn to control your urge to urinate  Weight Management   Why it is important to manage your weight:  Being overweight increases your risk of health conditions such as heart disease, high blood pressure, type 2 diabetes, and certain types of cancer  It can also increase your risk for osteoarthritis, sleep apnea, and other respiratory problems  Aim for a slow, steady weight loss  Even a small amount of weight loss can lower your risk of health problems  How to lose weight safely:  A safe and healthy way to lose weight is to eat fewer calories and get regular exercise  You can lose up about 1 pound a week by decreasing the number of calories you eat by 500 calories each day  Healthy meal plan for weight management:  A healthy meal plan includes a variety of foods, contains fewer calories, and helps you stay healthy  A healthy meal plan includes the following:  · Eat whole-grain foods more often  A healthy meal plan should contain fiber  Fiber is the part of grains, fruits, and vegetables that is not broken down by your body   Whole-grain foods are healthy and provide extra fiber in your diet  Some examples of whole-grain foods are whole-wheat breads and pastas, oatmeal, brown rice, and bulgur  · Eat a variety of vegetables every day  Include dark, leafy greens such as spinach, kale, dominic greens, and mustard greens  Eat yellow and orange vegetables such as carrots, sweet potatoes, and winter squash  · Eat a variety of fruits every day  Choose fresh or canned fruit (canned in its own juice or light syrup) instead of juice  Fruit juice has very little or no fiber  · Eat low-fat dairy foods  Drink fat-free (skim) milk or 1% milk  Eat fat-free yogurt and low-fat cottage cheese  Try low-fat cheeses such as mozzarella and other reduced-fat cheeses  · Choose meat and other protein foods that are low in fat  Choose beans or other legumes such as split peas or lentils  Choose fish, skinless poultry (chicken or turkey), or lean cuts of red meat (beef or pork)  Before you cook meat or poultry, cut off any visible fat  · Use less fat and oil  Try baking foods instead of frying them  Add less fat, such as margarine, sour cream, regular salad dressing and mayonnaise to foods  Eat fewer high-fat foods  Some examples of high-fat foods include french fries, doughnuts, ice cream, and cakes  · Eat fewer sweets  Limit foods and drinks that are high in sugar  This includes candy, cookies, regular soda, and sweetened drinks  Exercise:  Exercise at least 30 minutes per day on most days of the week  Some examples of exercise include walking, biking, dancing, and swimming  You can also fit in more physical activity by taking the stairs instead of the elevator or parking farther away from stores  Ask your healthcare provider about the best exercise plan for you  © Copyright [employer] 2018 Information is for End User's use only and may not be sold, redistributed or otherwise used for commercial purposes   All illustrations and images included in CareNotes® are the copyrighted property of [employer]  or [facility]

## 2020-09-16 NOTE — ASSESSMENT & PLAN NOTE
Patient is still having significant pain in her back and legs  She does have severe spinal stenosis with associated peripheral neuropathy  She is not a surgical candidate  Her dose of gabapentin was decreased while in the hospital due to mental status change which could have been related to pneumonia or possibly related to her increased dose of gabapentin  100 mg of gabapentin twice daily does not seem to be sufficient for pain control  Increase to 200 mg twice daily and also provided additional p r n  Vicodin for her daughter to be able to use for worsening breakthrough pain    Prior to prescribing the controlled substance, a patient search was performed on the [facility] prescription drug monitoring program web site  There was no evidence of diversion or misuse    Prescription provided

## 2020-09-16 NOTE — ASSESSMENT & PLAN NOTE
Subsequent annual visit completed  Patient does not qualify for recommended screenings based upon her age  Despite what the chart says she will be turning 80years of age next week    -daughter continues to take a minimalist approach to her health    Ultimately trying to keep her as comfortable as possible

## 2020-10-20 DIAGNOSIS — M25.511 ACUTE PAIN OF RIGHT SHOULDER: ICD-10-CM

## 2020-10-20 RX ORDER — HYDROCODONE BITARTRATE AND ACETAMINOPHEN 5; 325 MG/1; MG/1
TABLET ORAL
Qty: 120 TABLET | Refills: 0 | Status: SHIPPED | OUTPATIENT
Start: 2020-10-20 | End: 2020-11-24

## 2020-11-24 DIAGNOSIS — F51.01 PRIMARY INSOMNIA: ICD-10-CM

## 2020-11-24 DIAGNOSIS — M25.511 ACUTE PAIN OF RIGHT SHOULDER: ICD-10-CM

## 2020-11-24 RX ORDER — ALPRAZOLAM 0.25 MG/1
TABLET ORAL
Qty: 60 TABLET | Refills: 0 | Status: SHIPPED | OUTPATIENT
Start: 2020-11-24 | End: 2020-12-27

## 2020-11-24 RX ORDER — HYDROCODONE BITARTRATE AND ACETAMINOPHEN 5; 325 MG/1; MG/1
TABLET ORAL
Qty: 120 TABLET | Refills: 0 | Status: SHIPPED | OUTPATIENT
Start: 2020-11-24 | End: 2020-12-27

## 2020-11-25 ENCOUNTER — TELEPHONE (OUTPATIENT)
Dept: FAMILY MEDICINE CLINIC | Facility: CLINIC | Age: 85
End: 2020-11-25

## 2020-11-25 DIAGNOSIS — K59.03 CONSTIPATION DUE TO PAIN MEDICATION: Primary | Chronic | ICD-10-CM

## 2020-11-25 RX ORDER — LINACLOTIDE 145 UG/1
145 CAPSULE, GELATIN COATED ORAL DAILY
Qty: 90 CAPSULE | Refills: 1 | Status: SHIPPED | OUTPATIENT
Start: 2020-11-25 | End: 2021-01-01 | Stop reason: SDUPTHER

## 2020-11-27 DIAGNOSIS — G47.00 INSOMNIA, UNSPECIFIED TYPE: ICD-10-CM

## 2020-11-27 RX ORDER — TRAZODONE HYDROCHLORIDE 50 MG/1
TABLET ORAL
Qty: 90 TABLET | Refills: 3 | Status: SHIPPED | OUTPATIENT
Start: 2020-11-27 | End: 2021-01-01

## 2020-12-27 DIAGNOSIS — F51.01 PRIMARY INSOMNIA: ICD-10-CM

## 2020-12-27 DIAGNOSIS — M25.511 ACUTE PAIN OF RIGHT SHOULDER: ICD-10-CM

## 2020-12-27 RX ORDER — HYDROCODONE BITARTRATE AND ACETAMINOPHEN 5; 325 MG/1; MG/1
TABLET ORAL
Qty: 120 TABLET | Refills: 0 | Status: SHIPPED | OUTPATIENT
Start: 2020-12-27 | End: 2021-03-01

## 2020-12-27 RX ORDER — ALPRAZOLAM 0.25 MG/1
TABLET ORAL
Qty: 60 TABLET | Refills: 0 | Status: SHIPPED | OUTPATIENT
Start: 2020-12-27 | End: 2021-03-01

## 2021-01-01 ENCOUNTER — TELEPHONE (OUTPATIENT)
Dept: FAMILY MEDICINE CLINIC | Facility: CLINIC | Age: 86
End: 2021-01-01

## 2021-01-01 ENCOUNTER — OFFICE VISIT (OUTPATIENT)
Dept: FAMILY MEDICINE CLINIC | Facility: CLINIC | Age: 86
End: 2021-01-01
Payer: MEDICARE

## 2021-01-01 ENCOUNTER — HOSPITAL ENCOUNTER (EMERGENCY)
Facility: HOSPITAL | Age: 86
Discharge: HOME | End: 2021-10-28
Attending: EMERGENCY MEDICINE | Admitting: EMERGENCY MEDICINE
Payer: MEDICARE

## 2021-01-01 ENCOUNTER — APPOINTMENT (EMERGENCY)
Dept: RADIOLOGY | Facility: HOSPITAL | Age: 86
End: 2021-01-01
Payer: MEDICARE

## 2021-01-01 VITALS
OXYGEN SATURATION: 95 % | SYSTOLIC BLOOD PRESSURE: 159 MMHG | TEMPERATURE: 98.7 F | BODY MASS INDEX: 27.34 KG/M2 | RESPIRATION RATE: 20 BRPM | HEART RATE: 68 BPM | WEIGHT: 140 LBS | DIASTOLIC BLOOD PRESSURE: 74 MMHG

## 2021-01-01 VITALS
HEART RATE: 64 BPM | BODY MASS INDEX: 27.34 KG/M2 | OXYGEN SATURATION: 98 % | DIASTOLIC BLOOD PRESSURE: 62 MMHG | SYSTOLIC BLOOD PRESSURE: 124 MMHG | WEIGHT: 140 LBS

## 2021-01-01 VITALS — DIASTOLIC BLOOD PRESSURE: 68 MMHG | SYSTOLIC BLOOD PRESSURE: 128 MMHG | HEART RATE: 80 BPM | OXYGEN SATURATION: 98 %

## 2021-01-01 DIAGNOSIS — R42 POSTURAL DIZZINESS WITH NEAR SYNCOPE: ICD-10-CM

## 2021-01-01 DIAGNOSIS — G62.9 PERIPHERAL POLYNEUROPATHY: ICD-10-CM

## 2021-01-01 DIAGNOSIS — R55 POSTURAL DIZZINESS WITH NEAR SYNCOPE: ICD-10-CM

## 2021-01-01 DIAGNOSIS — F11.20 CONTINUOUS OPIOID DEPENDENCE (HCC): ICD-10-CM

## 2021-01-01 DIAGNOSIS — M48.062 LUMBAR STENOSIS WITH NEUROGENIC CLAUDICATION: ICD-10-CM

## 2021-01-01 DIAGNOSIS — F11.20 CONTINUOUS OPIOID DEPENDENCE (HCC): Chronic | ICD-10-CM

## 2021-01-01 DIAGNOSIS — I10 ESSENTIAL HYPERTENSION: ICD-10-CM

## 2021-01-01 DIAGNOSIS — M19.011 ARTHRITIS OF RIGHT SHOULDER REGION: ICD-10-CM

## 2021-01-01 DIAGNOSIS — G93.40 ACUTE ENCEPHALOPATHY: ICD-10-CM

## 2021-01-01 DIAGNOSIS — G25.81 RESTLESS LEGS SYNDROME: ICD-10-CM

## 2021-01-01 DIAGNOSIS — E87.1 HYPONATREMIA: ICD-10-CM

## 2021-01-01 DIAGNOSIS — F51.01 PRIMARY INSOMNIA: ICD-10-CM

## 2021-01-01 DIAGNOSIS — R60.0 BILATERAL LOWER EXTREMITY EDEMA: Primary | ICD-10-CM

## 2021-01-01 DIAGNOSIS — K59.03 CONSTIPATION DUE TO PAIN MEDICATION: Chronic | ICD-10-CM

## 2021-01-01 DIAGNOSIS — M25.511 ACUTE PAIN OF RIGHT SHOULDER: ICD-10-CM

## 2021-01-01 DIAGNOSIS — G89.4 CHRONIC PAIN SYNDROME: ICD-10-CM

## 2021-01-01 DIAGNOSIS — I16.0 HYPERTENSIVE URGENCY: ICD-10-CM

## 2021-01-01 DIAGNOSIS — I48.0 PAROXYSMAL ATRIAL FIBRILLATION (HCC): Primary | Chronic | ICD-10-CM

## 2021-01-01 DIAGNOSIS — K27.9 PEPTIC ULCER DISEASE: ICD-10-CM

## 2021-01-01 DIAGNOSIS — M17.11 PRIMARY OSTEOARTHRITIS OF RIGHT KNEE: ICD-10-CM

## 2021-01-01 DIAGNOSIS — F03.90 SENILE DEMENTIA WITHOUT BEHAVIORAL DISTURBANCE (HCC): ICD-10-CM

## 2021-01-01 DIAGNOSIS — N18.30 STAGE 3 CHRONIC KIDNEY DISEASE, UNSPECIFIED WHETHER STAGE 3A OR 3B CKD (HCC): ICD-10-CM

## 2021-01-01 DIAGNOSIS — G47.00 INSOMNIA, UNSPECIFIED TYPE: ICD-10-CM

## 2021-01-01 DIAGNOSIS — Z91.89 AT RISK FOR DEHYDRATION DUE TO POOR FLUID INTAKE: ICD-10-CM

## 2021-01-01 DIAGNOSIS — I48.20 CHRONIC ATRIAL FIBRILLATION (HCC): ICD-10-CM

## 2021-01-01 DIAGNOSIS — G25.81 RESTLESS LEG SYNDROME: ICD-10-CM

## 2021-01-01 DIAGNOSIS — I48.0 PAROXYSMAL ATRIAL FIBRILLATION (HCC): Chronic | ICD-10-CM

## 2021-01-01 DIAGNOSIS — L71.9 ROSACEA: ICD-10-CM

## 2021-01-01 DIAGNOSIS — Z00.00 MEDICARE ANNUAL WELLNESS VISIT, SUBSEQUENT: Primary | ICD-10-CM

## 2021-01-01 LAB
ALBUMIN SERPL BCP-MCNC: 3.9 G/DL (ref 3.5–5)
ALP SERPL-CCNC: 96 U/L (ref 46–116)
ALT SERPL W P-5'-P-CCNC: 20 U/L (ref 12–78)
ANION GAP SERPL CALCULATED.3IONS-SCNC: 11 MMOL/L (ref 4–13)
AST SERPL W P-5'-P-CCNC: 20 U/L (ref 5–45)
ATRIAL RATE: 66 BPM
ATRIAL RATE: 70 BPM
BASOPHILS # BLD AUTO: 0.06 THOUSANDS/ΜL (ref 0–0.1)
BASOPHILS NFR BLD AUTO: 1 % (ref 0–1)
BILIRUB SERPL-MCNC: 0.38 MG/DL (ref 0.2–1)
BUN SERPL-MCNC: 13 MG/DL (ref 5–25)
CALCIUM SERPL-MCNC: 8.9 MG/DL (ref 8.3–10.1)
CHLORIDE SERPL-SCNC: 100 MMOL/L (ref 100–108)
CO2 SERPL-SCNC: 22 MMOL/L (ref 21–32)
CREAT SERPL-MCNC: 1.17 MG/DL (ref 0.6–1.3)
EOSINOPHIL # BLD AUTO: 0.04 THOUSAND/ΜL (ref 0–0.61)
EOSINOPHIL NFR BLD AUTO: 1 % (ref 0–6)
ERYTHROCYTE [DISTWIDTH] IN BLOOD BY AUTOMATED COUNT: 13.5 % (ref 11.6–15.1)
GFR SERPL CREATININE-BSD FRML MDRD: 40 ML/MIN/1.73SQ M
GLUCOSE SERPL-MCNC: 99 MG/DL (ref 65–140)
HCT VFR BLD AUTO: 40.2 % (ref 34.8–46.1)
HGB BLD-MCNC: 13.8 G/DL (ref 11.5–15.4)
IMM GRANULOCYTES # BLD AUTO: 0.01 THOUSAND/UL (ref 0–0.2)
IMM GRANULOCYTES NFR BLD AUTO: 0 % (ref 0–2)
LYMPHOCYTES # BLD AUTO: 2.6 THOUSANDS/ΜL (ref 0.6–4.47)
LYMPHOCYTES NFR BLD AUTO: 52 % (ref 14–44)
MCH RBC QN AUTO: 32.9 PG (ref 26.8–34.3)
MCHC RBC AUTO-ENTMCNC: 34.3 G/DL (ref 31.4–37.4)
MCV RBC AUTO: 96 FL (ref 82–98)
MONOCYTES # BLD AUTO: 0.5 THOUSAND/ΜL (ref 0.17–1.22)
MONOCYTES NFR BLD AUTO: 10 % (ref 4–12)
NEUTROPHILS # BLD AUTO: 1.84 THOUSANDS/ΜL (ref 1.85–7.62)
NEUTS SEG NFR BLD AUTO: 36 % (ref 43–75)
NRBC BLD AUTO-RTO: 0 /100 WBCS
NT-PROBNP SERPL-MCNC: 487 PG/ML
P AXIS: 106 DEGREES
P AXIS: 87 DEGREES
PLATELET # BLD AUTO: 309 THOUSANDS/UL (ref 149–390)
PMV BLD AUTO: 9 FL (ref 8.9–12.7)
POTASSIUM SERPL-SCNC: 4.1 MMOL/L (ref 3.5–5.3)
PR INTERVAL: 302 MS
PR INTERVAL: 304 MS
PROT SERPL-MCNC: 8.2 G/DL (ref 6.4–8.2)
QRS AXIS: -57 DEGREES
QRS AXIS: -59 DEGREES
QRSD INTERVAL: 82 MS
QRSD INTERVAL: 84 MS
QT INTERVAL: 380 MS
QT INTERVAL: 390 MS
QTC INTERVAL: 408 MS
QTC INTERVAL: 410 MS
RBC # BLD AUTO: 4.2 MILLION/UL (ref 3.81–5.12)
SODIUM SERPL-SCNC: 133 MMOL/L (ref 136–145)
T WAVE AXIS: 60 DEGREES
T WAVE AXIS: 84 DEGREES
TROPONIN I SERPL-MCNC: <0.02 NG/ML
TSH SERPL DL<=0.05 MIU/L-ACNC: 3.15 UIU/ML (ref 0.36–3.74)
VENTRICULAR RATE: 66 BPM
VENTRICULAR RATE: 70 BPM
WBC # BLD AUTO: 5.05 THOUSAND/UL (ref 4.31–10.16)

## 2021-01-01 PROCEDURE — 85025 COMPLETE CBC W/AUTO DIFF WBC: CPT | Performed by: EMERGENCY MEDICINE

## 2021-01-01 PROCEDURE — 93005 ELECTROCARDIOGRAM TRACING: CPT

## 2021-01-01 PROCEDURE — 99214 OFFICE O/P EST MOD 30 MIN: CPT | Performed by: FAMILY MEDICINE

## 2021-01-01 PROCEDURE — 36415 COLL VENOUS BLD VENIPUNCTURE: CPT | Performed by: EMERGENCY MEDICINE

## 2021-01-01 PROCEDURE — 71045 X-RAY EXAM CHEST 1 VIEW: CPT

## 2021-01-01 PROCEDURE — 99284 EMERGENCY DEPT VISIT MOD MDM: CPT

## 2021-01-01 PROCEDURE — 93010 ELECTROCARDIOGRAM REPORT: CPT | Performed by: INTERNAL MEDICINE

## 2021-01-01 PROCEDURE — 83880 ASSAY OF NATRIURETIC PEPTIDE: CPT | Performed by: EMERGENCY MEDICINE

## 2021-01-01 PROCEDURE — 84443 ASSAY THYROID STIM HORMONE: CPT | Performed by: EMERGENCY MEDICINE

## 2021-01-01 PROCEDURE — 1123F ACP DISCUSS/DSCN MKR DOCD: CPT | Performed by: FAMILY MEDICINE

## 2021-01-01 PROCEDURE — 20610 DRAIN/INJ JOINT/BURSA W/O US: CPT | Performed by: FAMILY MEDICINE

## 2021-01-01 PROCEDURE — 80053 COMPREHEN METABOLIC PANEL: CPT | Performed by: EMERGENCY MEDICINE

## 2021-01-01 PROCEDURE — 99284 EMERGENCY DEPT VISIT MOD MDM: CPT | Performed by: EMERGENCY MEDICINE

## 2021-01-01 PROCEDURE — 84484 ASSAY OF TROPONIN QUANT: CPT | Performed by: EMERGENCY MEDICINE

## 2021-01-01 PROCEDURE — G0439 PPPS, SUBSEQ VISIT: HCPCS | Performed by: FAMILY MEDICINE

## 2021-01-01 PROCEDURE — 99215 OFFICE O/P EST HI 40 MIN: CPT | Performed by: FAMILY MEDICINE

## 2021-01-01 RX ORDER — ALPRAZOLAM 0.25 MG/1
TABLET ORAL
Qty: 60 TABLET | Refills: 1 | Status: SHIPPED | OUTPATIENT
Start: 2021-01-01

## 2021-01-01 RX ORDER — HYDROCODONE BITARTRATE AND ACETAMINOPHEN 7.5; 325 MG/1; MG/1
TABLET ORAL
Qty: 120 TABLET | Refills: 0 | Status: SHIPPED | OUTPATIENT
Start: 2021-01-01 | End: 2021-01-01

## 2021-01-01 RX ORDER — ROPINIROLE 2 MG/1
2 TABLET, FILM COATED ORAL
Qty: 90 TABLET | Refills: 3 | Status: SHIPPED | OUTPATIENT
Start: 2021-01-01

## 2021-01-01 RX ORDER — FENTANYL 12 UG/H
1 PATCH TRANSDERMAL
Qty: 10 PATCH | Refills: 0 | Status: SHIPPED | OUTPATIENT
Start: 2021-01-01

## 2021-01-01 RX ORDER — GABAPENTIN 100 MG/1
300 CAPSULE ORAL 2 TIMES DAILY
Qty: 180 CAPSULE | Refills: 3 | Status: SHIPPED | OUTPATIENT
Start: 2021-01-01 | End: 2021-01-01 | Stop reason: SDUPTHER

## 2021-01-01 RX ORDER — MECLIZINE HYDROCHLORIDE 25 MG/1
TABLET ORAL
Qty: 30 TABLET | Refills: 3 | Status: SHIPPED | OUTPATIENT
Start: 2021-01-01 | End: 2021-01-01

## 2021-01-01 RX ORDER — FENTANYL 12 UG/H
1 PATCH TRANSDERMAL
Qty: 10 PATCH | Refills: 0 | Status: SHIPPED | OUTPATIENT
Start: 2021-01-01 | End: 2021-01-01 | Stop reason: SDUPTHER

## 2021-01-01 RX ORDER — AMLODIPINE BESYLATE 10 MG/1
10 TABLET ORAL DAILY
Qty: 90 TABLET | Refills: 3 | Status: SHIPPED | OUTPATIENT
Start: 2021-01-01

## 2021-01-01 RX ORDER — AMIODARONE HYDROCHLORIDE 100 MG/1
100 TABLET ORAL DAILY
Qty: 90 TABLET | Refills: 3 | Status: SHIPPED | OUTPATIENT
Start: 2021-01-01

## 2021-01-01 RX ORDER — LIDOCAINE HYDROCHLORIDE 10 MG/ML
1 INJECTION, SOLUTION INFILTRATION; PERINEURAL
Status: COMPLETED | OUTPATIENT
Start: 2021-01-01 | End: 2021-01-01

## 2021-01-01 RX ORDER — GABAPENTIN 100 MG/1
CAPSULE ORAL
Qty: 120 CAPSULE | Refills: 5 | Status: SHIPPED | OUTPATIENT
Start: 2021-01-01 | End: 2021-01-01 | Stop reason: SDUPTHER

## 2021-01-01 RX ORDER — ALPRAZOLAM 0.25 MG/1
TABLET ORAL
Qty: 60 TABLET | Refills: 0 | Status: SHIPPED | OUTPATIENT
Start: 2021-01-01 | End: 2021-01-01

## 2021-01-01 RX ORDER — TRAZODONE HYDROCHLORIDE 50 MG/1
TABLET ORAL
Qty: 90 TABLET | Refills: 3 | Status: SHIPPED | OUTPATIENT
Start: 2021-01-01

## 2021-01-01 RX ORDER — LISINOPRIL 40 MG/1
TABLET ORAL
Qty: 90 TABLET | Refills: 3 | Status: SHIPPED | OUTPATIENT
Start: 2021-01-01

## 2021-01-01 RX ORDER — LISINOPRIL 20 MG/1
20 TABLET ORAL
Qty: 90 TABLET | Refills: 3 | Status: SHIPPED | OUTPATIENT
Start: 2021-01-01

## 2021-01-01 RX ORDER — ALPRAZOLAM 0.25 MG/1
0.25 TABLET ORAL 2 TIMES DAILY PRN
Qty: 60 TABLET | Refills: 3 | Status: SHIPPED | OUTPATIENT
Start: 2021-01-01 | End: 2021-01-01

## 2021-01-01 RX ORDER — FAMOTIDINE 20 MG/1
TABLET, FILM COATED ORAL
Qty: 180 TABLET | Refills: 3 | Status: SHIPPED | OUTPATIENT
Start: 2021-01-01

## 2021-01-01 RX ORDER — HYDROCODONE BITARTRATE AND ACETAMINOPHEN 7.5; 325 MG/1; MG/1
1 TABLET ORAL EVERY 6 HOURS PRN
Qty: 120 TABLET | Refills: 0 | Status: SHIPPED | OUTPATIENT
Start: 2021-01-01 | End: 2021-01-01

## 2021-01-01 RX ORDER — LINACLOTIDE 145 UG/1
145 CAPSULE, GELATIN COATED ORAL DAILY
Qty: 90 CAPSULE | Refills: 1 | Status: SHIPPED | OUTPATIENT
Start: 2021-01-01 | End: 2021-01-01

## 2021-01-01 RX ORDER — HYDROCODONE BITARTRATE AND ACETAMINOPHEN 5; 325 MG/1; MG/1
TABLET ORAL
Qty: 120 TABLET | Refills: 0 | Status: SHIPPED | OUTPATIENT
Start: 2021-01-01 | End: 2021-01-01

## 2021-01-01 RX ORDER — SUCRALFATE 1 G/1
TABLET ORAL
Qty: 90 TABLET | Refills: 5 | Status: SHIPPED | OUTPATIENT
Start: 2021-01-01

## 2021-01-01 RX ORDER — HYDROCODONE BITARTRATE AND ACETAMINOPHEN 7.5; 325 MG/1; MG/1
TABLET ORAL
Qty: 120 TABLET | Refills: 0 | Status: SHIPPED | OUTPATIENT
Start: 2021-01-01

## 2021-01-01 RX ORDER — AMIODARONE HYDROCHLORIDE 200 MG/1
100 TABLET ORAL DAILY
COMMUNITY
Start: 2021-01-01

## 2021-01-01 RX ORDER — GABAPENTIN 300 MG/1
300 CAPSULE ORAL 3 TIMES DAILY
Qty: 270 CAPSULE | Refills: 1 | Status: SHIPPED | OUTPATIENT
Start: 2021-01-01

## 2021-01-01 RX ORDER — METHYLPREDNISOLONE ACETATE 40 MG/ML
1 INJECTION, SUSPENSION INTRA-ARTICULAR; INTRALESIONAL; INTRAMUSCULAR; SOFT TISSUE
Status: COMPLETED | OUTPATIENT
Start: 2021-01-01 | End: 2021-01-01

## 2021-01-01 RX ORDER — LINACLOTIDE 145 UG/1
CAPSULE, GELATIN COATED ORAL
Qty: 90 CAPSULE | Refills: 1 | Status: SHIPPED | OUTPATIENT
Start: 2021-01-01

## 2021-01-01 RX ORDER — SUCRALFATE 1 G/1
TABLET ORAL
Qty: 90 TABLET | Refills: 5 | Status: SHIPPED | OUTPATIENT
Start: 2021-01-01 | End: 2021-01-01

## 2021-01-01 RX ORDER — MECLIZINE HYDROCHLORIDE 25 MG/1
TABLET ORAL
Qty: 30 TABLET | Refills: 3 | Status: SHIPPED | OUTPATIENT
Start: 2021-01-01

## 2021-01-01 RX ORDER — MECLIZINE HYDROCHLORIDE 25 MG/1
25 TABLET ORAL EVERY 8 HOURS PRN
Qty: 30 TABLET | Refills: 3 | Status: SHIPPED | OUTPATIENT
Start: 2021-01-01 | End: 2021-01-01

## 2021-01-01 RX ORDER — ALPRAZOLAM 0.25 MG/1
TABLET ORAL
Qty: 60 TABLET | Refills: 1 | Status: SHIPPED | OUTPATIENT
Start: 2021-01-01 | End: 2021-01-01 | Stop reason: SDUPTHER

## 2021-01-01 RX ADMIN — METHYLPREDNISOLONE ACETATE 1 ML: 40 INJECTION, SUSPENSION INTRA-ARTICULAR; INTRALESIONAL; INTRAMUSCULAR; SOFT TISSUE at 13:10

## 2021-01-01 RX ADMIN — LIDOCAINE HYDROCHLORIDE 1 ML: 10 INJECTION, SOLUTION INFILTRATION; PERINEURAL at 13:10

## 2021-02-27 DIAGNOSIS — M25.511 ACUTE PAIN OF RIGHT SHOULDER: ICD-10-CM

## 2021-02-27 DIAGNOSIS — F51.01 PRIMARY INSOMNIA: ICD-10-CM

## 2021-03-01 RX ORDER — HYDROCODONE BITARTRATE AND ACETAMINOPHEN 5; 325 MG/1; MG/1
TABLET ORAL
Qty: 120 TABLET | Refills: 0 | Status: SHIPPED | OUTPATIENT
Start: 2021-03-01 | End: 2021-01-01

## 2021-03-01 RX ORDER — ALPRAZOLAM 0.25 MG/1
TABLET ORAL
Qty: 60 TABLET | Refills: 0 | Status: SHIPPED | OUTPATIENT
Start: 2021-03-01 | End: 2021-01-01

## 2021-04-02 NOTE — TELEPHONE ENCOUNTER
Please notify the patient's daughter that at some point she will need to be seen simply for re-evaluation  I realize that she is busy with her mother-in-law who is on home hospice  Refilled p r n   Alprazolam and Vicodin

## 2021-05-06 PROBLEM — E87.1 HYPONATREMIA: Status: RESOLVED | Noted: 2019-05-03 | Resolved: 2021-01-01

## 2021-05-06 PROBLEM — M17.11 PRIMARY OSTEOARTHRITIS OF RIGHT KNEE: Status: ACTIVE | Noted: 2021-01-01

## 2021-05-06 PROBLEM — G93.41 ACUTE METABOLIC ENCEPHALOPATHY: Status: RESOLVED | Noted: 2020-01-26 | Resolved: 2021-01-01

## 2021-05-06 NOTE — ASSESSMENT & PLAN NOTE
Patient remains on gabapentin 200 mg twice daily and has been tolerating this very well  Will increase her dosage to 300 mg twice daily  If daughter does notice any evidence of confusion or alteration in mental state then she can simply drop back down to 200 mg twice daily    If tolerating eating well after 1 month then she can contact the office and I will send 300 mg capsules

## 2021-05-06 NOTE — ASSESSMENT & PLAN NOTE
Chronic constipation due to narcotic pain medications    Patient remains on Linzess, MiraLax and Colace

## 2021-05-06 NOTE — ASSESSMENT & PLAN NOTE
Patient remains on p r n  Vicodin  Increased strength of dosage  Daughter sets up her medications and does monitor her use  Compliant with taking pain medications    If this is sufficient to keep a 80year-old out of chronic pain then that works

## 2021-05-06 NOTE — ASSESSMENT & PLAN NOTE
Patient remains on amiodarone  Seems to be in sinus rhythm at this office visit    Not on full anticoagulation given risk of bleeding and age

## 2021-05-06 NOTE — PROGRESS NOTES
Subjective:      Patient ID: Yannick Demarco is a 80 y o  female  69-year-old who is actually 80years of age presents with her daughter for follow-up of chronic conditions  Overall the patient is doing well  She is diffusely arthritic in numerous joints including right shoulder, bilateral knees right greater than left  Ambulates with the assistance of a walker  Declines COVID-19 vaccination  She is largely homebound  No recent labs  Daughter declines having any labs performed  Mother would not want to  Does need refills of Vicodin which she does take on a daily basis for chronic pain related to her arthritis, refills of alprazolam   Still having more neuropathic pain in both of her legs  In the past we did try to increase her dose of gabapentin  She was hospitalized in late January of last year  Daughter thinks that she may have actually had COVID-19 infection but we were not testing for it at that time  Patient's son had been in [place] prior to the patient becoming ill  Past Medical History:   Diagnosis Date    Acute metabolic encephalopathy 7/62/4982    Chest pain     last assessed-4/15/2013    Chronic pain     back    Gait disturbance     last assessed-2/12/2014    Hypertension     Hyponatremia     Hyponatremia 5/3/2019    Hypoxemia     last assessed-1/5/2015    Melena     last assessed-2/23/2017    Pneumonia due to infectious organism 1/26/2020    Rotator cuff injury     Wheezing     last assessed-1/5/2015       Family History   Problem Relation Age of Onset    Heart attack Mother        Past Surgical History:   Procedure Laterality Date    APPENDECTOMY      BACK SURGERY      EYE SURGERY          reports that she has never smoked  She has never used smokeless tobacco  She reports that she does not drink alcohol or use drugs        Current Outpatient Medications:     acetaminophen (TYLENOL) 325 mg tablet, Take 2 tablets (650 mg total) by mouth every 6 (six) hours as needed for mild pain, headaches or fever, Disp: 30 tablet, Rfl: 0    ALPRAZolam (XANAX) 0 25 mg tablet, Take 1 tablet (0 25 mg total) by mouth 2 (two) times a day as needed for anxiety or sleep, Disp: 60 tablet, Rfl: 3    amiodarone 100 mg tablet, Take 1 tablet (100 mg total) by mouth daily, Disp: 90 tablet, Rfl: 3    amLODIPine (NORVASC) 10 mg tablet, Take 1 tablet (10 mg total) by mouth daily, Disp: 90 tablet, Rfl: 3    cyclobenzaprine (FLEXERIL) 5 mg tablet, take 1 tablet BY MOUTH three times a day if needed for MUSCLE SPASMS, Disp: 90 tablet, Rfl: 0    dorzolamide-timolol (COSOPT) 22 3-6 8 MG/ML ophthalmic solution, Administer 1 drop into the left eye 2 (two) times a day, Disp: , Rfl:     famotidine (PEPCID) 20 mg tablet, Take 1 tablet (20 mg total) by mouth 2 (two) times a day, Disp: 180 tablet, Rfl: 3    gabapentin (NEURONTIN) 100 mg capsule, Take 3 capsules (300 mg total) by mouth 2 (two) times a day, Disp: 180 capsule, Rfl: 3    hydrALAZINE (APRESOLINE) 10 mg tablet, Take 1 tablet (10 mg total) by mouth 2 (two) times a day as needed (For SBP>160 or DBP>90 consistently, start taking this medication ), Disp: 180 tablet, Rfl: 0    linaCLOtide (Linzess) 145 MCG CAPS, Take 1 capsule (145 mcg total) by mouth daily, Disp: 90 capsule, Rfl: 1    lisinopril (ZESTRIL) 20 mg tablet, Take 1 tablet (20 mg total) by mouth daily at bedtime as needed (If systolic blood pressure more than 160), Disp: 90 tablet, Rfl: 3    lisinopril (ZESTRIL) 40 mg tablet, take 1 tablet by mouth once daily, Disp: 90 tablet, Rfl: 3    meclizine (ANTIVERT) 25 mg tablet, Take 1 tablet (25 mg total) by mouth every 8 (eight) hours as needed for dizziness, Disp: 30 tablet, Rfl: 3    Multiple Vitamins-Minerals (ICAPS AREDS 2 PO), Take 1 tablet by mouth 2 (two) times a day, Disp: , Rfl:     Omega-3 Fatty Acids (FISH OIL) 1,000 mg, Take 1,000 mg by mouth 2 (two) times a day, Disp: , Rfl:     rOPINIRole (REQUIP) 2 mg tablet, Take 1 tablet (2 mg total) by mouth daily at bedtime, Disp: 90 tablet, Rfl: 3    sucralfate (CARAFATE) 1 g tablet, take 1 tablet by mouth three times a day before meals, Disp: 90 tablet, Rfl: 5    traZODone (DESYREL) 50 mg tablet, take 1 tablet by mouth at bedtime, Disp: 90 tablet, Rfl: 3    aspirin 81 MG tablet, Take 81 mg by mouth 2 (two) times a day, Disp: , Rfl:     HYDROcodone-acetaminophen (NORCO) 7 5-325 mg per tablet, Take 1 tablet by mouth every 6 (six) hours as needed for painMax Daily Amount: 4 tablets, Disp: 120 tablet, Rfl: 0    The following portions of the patient's history were reviewed and updated as appropriate: allergies, current medications, past family history, past medical history, past social history, past surgical history and problem list     Review of Systems   Constitutional: Negative  HENT: Negative  Eyes: Negative  Respiratory: Negative  Cardiovascular: Negative  Negative for leg swelling  Gastrointestinal: Negative  Endocrine: Negative  Genitourinary: Negative  Musculoskeletal: Positive for arthralgias ( diffuse arthralgias especially in right shoulder, right knee) and gait problem ( ambulates with the assistance of a walker)  Skin: Negative  Allergic/Immunologic: Negative  Neurological: Positive for numbness (Bilateral lower extremities)  Hematological: Negative  Psychiatric/Behavioral: Negative  Objective:    /62   Pulse 64   Wt 63 5 kg (140 lb)   SpO2 98%   BMI 27 34 kg/m²      Physical Exam  Vitals signs and nursing note reviewed  Constitutional:       General: She is not in acute distress  Appearance: Normal appearance  She is well-developed  She is not ill-appearing or diaphoretic  HENT:      Head: Normocephalic and atraumatic  Right Ear: Tympanic membrane, ear canal and external ear normal       Left Ear: Tympanic membrane, ear canal and external ear normal    Eyes:      Extraocular Movements: Extraocular movements intact  Conjunctiva/sclera: Conjunctivae normal       Pupils: Pupils are equal, round, and reactive to light  Neck:      Musculoskeletal: Normal range of motion and neck supple  Cardiovascular:      Rate and Rhythm: Normal rate and regular rhythm  Pulses: Normal pulses  Heart sounds: Normal heart sounds  No murmur  Pulmonary:      Effort: Pulmonary effort is normal       Breath sounds: Normal breath sounds  Abdominal:      General: Bowel sounds are normal       Palpations: Abdomen is soft  Musculoskeletal:         General: Tenderness ( significant crepitation bilateral knees right greater than left, decreased range of motion of the right shoulder with significant crepitation) and deformity (Diffuse arthritic changes in multiple joints) present  Right lower leg: No edema  Left lower leg: No edema  Skin:     General: Skin is warm and dry  Findings: No rash  Neurological:      General: No focal deficit present  Mental Status: She is alert and oriented to person, place, and time  Mental status is at baseline  Gait: Gait abnormal ( ambulates with the assistance of a walker)  Deep Tendon Reflexes: Reflexes are normal and symmetric  Psychiatric:         Mood and Affect: Mood normal          Behavior: Behavior normal          Thought Content: Thought content normal          Judgment: Judgment normal            No results found for this or any previous visit (from the past 1008 hour(s))  Assessment/Plan:    Constipation due to pain medication  Chronic constipation due to narcotic pain medications  Patient remains on Linzess, MiraLax and Colace    Paroxysmal atrial fibrillation [facility])  Patient remains on amiodarone  Seems to be in sinus rhythm at this office visit  Not on full anticoagulation given risk of bleeding and age    Postural dizziness with near syncope  Does take meclizine on a p r n  Basis    Essential hypertension  Patient remains on lisinopril 40 mg daily  She does have p r n  Lisinopril 20 mg to take if systolic blood pressure is elevated    Senile dementia without behavioral disturbance  Doing pretty well for 80years of age  Patient remains on fish oil supplementation    Peripheral polyneuropathy  Patient remains on gabapentin 200 mg twice daily and has been tolerating this very well  Will increase her dosage to 300 mg twice daily  If daughter does notice any evidence of confusion or alteration in mental state then she can simply drop back down to 200 mg twice daily  If tolerating eating well after 1 month then she can contact the office and I will send 300 mg capsules    Arthritis of right shoulder region  Intra-articular corticosteroid injection performed as per procedure note    Primary osteoarthritis of right knee  Intra-articular corticosteroid injection performed as per procedure note    Continuous opioid dependence ([facility] [address] )  Patient remains on p r n  Vicodin  Increased strength of dosage  Daughter sets up her medications and does monitor her use  Compliant with taking pain medications  If this is sufficient to keep a 80year-old out of chronic pain then that works    Primary insomnia  Patient remains on trazodone 50 mg at bedtime  This does help with her insomnia          Problem List Items Addressed This Visit        Digestive    Constipation due to pain medication (Chronic)     Chronic constipation due to narcotic pain medications  Patient remains on Linzess, MiraLax and Colace         Relevant Medications    linaCLOtide (Linzess) 145 MCG CAPS       Cardiovascular and Mediastinum    Essential hypertension     Patient remains on lisinopril 40 mg daily  She does have p r n  Lisinopril 20 mg to take if systolic blood pressure is elevated         Relevant Medications    lisinopril (ZESTRIL) 20 mg tablet    amLODIPine (NORVASC) 10 mg tablet    Paroxysmal atrial fibrillation (HCC) - Primary (Chronic)     Patient remains on amiodarone    Seems to be in sinus rhythm at this office visit  Not on full anticoagulation given risk of bleeding and age         Relevant Medications    amLODIPine (NORVASC) 10 mg tablet    amiodarone 100 mg tablet    Postural dizziness with near syncope     Does take meclizine on a p r n  Basis         Relevant Medications    meclizine (ANTIVERT) 25 mg tablet    HYDROcodone-acetaminophen (NORCO) 7 5-325 mg per tablet       Nervous and Auditory    Peripheral polyneuropathy     Patient remains on gabapentin 200 mg twice daily and has been tolerating this very well  Will increase her dosage to 300 mg twice daily  If daughter does notice any evidence of confusion or alteration in mental state then she can simply drop back down to 200 mg twice daily  If tolerating eating well after 1 month then she can contact the office and I will send 300 mg capsules         Senile dementia without behavioral disturbance ([facility] [address] )     Doing pretty well for 80years of age  Patient remains on fish oil supplementation         Relevant Medications    ALPRAZolam (XANAX) 0 25 mg tablet       Musculoskeletal and Integument    Arthritis of right shoulder region     Intra-articular corticosteroid injection performed as per procedure note         Relevant Medications    HYDROcodone-acetaminophen (NORCO) 7 5-325 mg per tablet    Primary osteoarthritis of right knee     Intra-articular corticosteroid injection performed as per procedure note            Other    Continuous opioid dependence ([facility] [address] ) (Chronic)     Patient remains on p r n  Vicodin  Increased strength of dosage  Daughter sets up her medications and does monitor her use  Compliant with taking pain medications    If this is sufficient to keep a 80year-old out of chronic pain then that works         RESOLVED: Hyponatremia    Lumbar stenosis with neurogenic claudication    Relevant Medications    HYDROcodone-acetaminophen (NORCO) 7 5-325 mg per tablet    Primary insomnia     Patient remains on trazodone 50 mg at bedtime  This does help with her insomnia         Relevant Medications    ALPRAZolam (XANAX) 0 25 mg tablet    Restless legs syndrome      Other Visit Diagnoses     Acute encephalopathy        Relevant Medications    gabapentin (NEURONTIN) 100 mg capsule    Restless leg syndrome        Relevant Medications    rOPINIRole (REQUIP) 2 mg tablet    Hypertensive urgency        Relevant Medications    lisinopril (ZESTRIL) 20 mg tablet    amLODIPine (NORVASC) 10 mg tablet    Chronic atrial fibrillation (HCC)        Relevant Medications    amLODIPine (NORVASC) 10 mg tablet    amiodarone 100 mg tablet          Large joint arthrocentesis: R glenohumeral  Frederic Protocol:  Consent: Verbal consent obtained  Consent given by: patient and guardian  Patient understanding: patient states understanding of the procedure being performed  Patient identity confirmed: verbally with patient    Supporting Documentation  Indications: pain   Procedure Details  Location: shoulder - R glenohumeral  Preparation: Patient was prepped and draped in the usual sterile fashion  Needle size: 22 G  Ultrasound guidance: no  Approach: posterior  Medications administered: 1 mL lidocaine 1 %; 1 mL methylPREDNISolone acetate 40 mg/mL    Patient tolerance: patient tolerated the procedure well with no immediate complications  Dressing:  Sterile dressing applied    Large joint arthrocentesis: R knee  Universal Protocol:  Consent: Verbal consent obtained    Consent given by: patient and guardian  Patient understanding: patient states understanding of the procedure being performed  Patient identity confirmed: verbally with patient    Supporting Documentation  Indications: pain   Procedure Details  Location: knee - R knee  Preparation: Patient was prepped and draped in the usual sterile fashion  Needle size: 22 G  Ultrasound guidance: no  Approach: medial  Medications administered: 1 mL lidocaine 1 %; 1 mL methylPREDNISolone acetate 40 mg/mL    Patient tolerance: patient tolerated the procedure well with no immediate complications  Dressing:  Sterile dressing applied

## 2021-05-06 NOTE — ASSESSMENT & PLAN NOTE
Patient remains on lisinopril 40 mg daily  She does have p r n   Lisinopril 20 mg to take if systolic blood pressure is elevated

## 2021-08-02 NOTE — TELEPHONE ENCOUNTER
[facility] 9/20/2021
We will try to keep it to a once yearly annual wellness visit, follow-up in the office  Unfortunately with her taking it narcotic pain medications DA will require office visit at the minimum every 6 months    The follow-up visit in May/June could be done virtually as that will work for me and be acceptable enough for documentation
gradual onset

## 2021-09-20 PROBLEM — N18.30 STAGE 3 CHRONIC KIDNEY DISEASE, UNSPECIFIED WHETHER STAGE 3A OR 3B CKD (HCC): Status: ACTIVE | Noted: 2021-01-01

## 2021-09-20 NOTE — PATIENT INSTRUCTIONS
Medicare Preventive Visit Patient Instructions  Thank you for completing your Welcome to Medicare Visit or Medicare Annual Wellness Visit today  Your next wellness visit will be due in one year (9/21/2022)  The screening/preventive services that you may require over the next 5-10 years are detailed below  Some tests may not apply to you based off risk factors and/or age  Screening tests ordered at today's visit but not completed yet may show as past due  Also, please note that scanned in results may not display below  Preventive Screenings:  Service Recommendations Previous Testing/Comments   Colorectal Cancer Screening  * Colonoscopy    * Fecal Occult Blood Test (FOBT)/Fecal Immunochemical Test (FIT)  * Fecal DNA/Cologuard Test  * Flexible Sigmoidoscopy Age: 54-65 years old   Colonoscopy: every 10 years (may be performed more frequently if at higher risk)  OR  FOBT/FIT: every 1 year  OR  Cologuard: every 3 years  OR  Sigmoidoscopy: every 5 years  Screening may be recommended earlier than age 48 if at higher risk for colorectal cancer  Also, an individualized decision between you and your healthcare provider will decide whether screening between the ages of 74-80 would be appropriate  Colonoscopy: Not on file  FOBT/FIT: 05/07/2019  Cologuard: Not on file  Sigmoidoscopy: Not on file    Screening Not Indicated     Breast Cancer Screening Age: 36 years old  Frequency: every 1-2 years  Not required if history of left and right mastectomy Mammogram: Not on file    Screening Not Indicated   Cervical Cancer Screening Between the ages of 21-29, pap smear recommended once every 3 years  Between the ages of 33-67, can perform pap smear with HPV co-testing every 5 years     Recommendations may differ for women with a history of total hysterectomy, cervical cancer, or abnormal pap smears in past  Pap Smear: Not on file    Screening Not Indicated   Hepatitis C Screening Once for adults born between [facility] frequently in patients at high risk for Hepatitis C Hep C Antibody: Not on file    Risks and Benefits Discussed  Patient Declines  Screening Not Indicated   Diabetes Screening 1-2 times per year if you're at risk for diabetes or have pre-diabetes Fasting glucose: 90 mg/dL   A1C: No results in last 5 years    Screening Current   Cholesterol Screening Once every 5 years if you don't have a lipid disorder  May order more often based on risk factors  Lipid panel: 10/23/2017    Screening Current     Other Preventive Screenings Covered by Medicare:  1  Abdominal Aortic Aneurysm (AAA) Screening: covered once if your at risk  You're considered to be at risk if you have a family history of AAA  2  Lung Cancer Screening: covers low dose CT scan once per year if you meet all of the following conditions: (1) Age 50-69; (2) No signs or symptoms of lung cancer; (3) Current smoker or have quit smoking within the last 15 years; (4) You have a tobacco smoking history of at least 30 pack years (packs per day multiplied by number of years you smoked); (5) You get a written order from a healthcare provider  3  Glaucoma Screening: covered annually if you're considered high risk: (1) You have diabetes OR (2) Family history of glaucoma OR (3)  aged 48 and older OR (3)  American aged 72 and older  3  Osteoporosis Screening: covered every 2 years if you meet one of the following conditions: (1) You're estrogen deficient and at risk for osteoporosis based off medical history and other findings; (2) Have a vertebral abnormality; (3) On glucocorticoid therapy for more than 3 months; (4) Have primary hyperparathyroidism; (5) On osteoporosis medications and need to assess response to drug therapy  · Last bone density test (DXA Scan): Not on file  5  HIV Screening: covered annually if you're between the age of 12-76   Also covered annually if you are younger than 13 and older than 72 with risk factors for HIV infection  For pregnant patients, it is covered up to 3 times per pregnancy  Immunizations:  Immunization Recommendations   Influenza Vaccine Annual influenza vaccination during flu season is recommended for all persons aged >= 6 months who do not have contraindications   Pneumococcal Vaccine (Prevnar and Pneumovax)  * Prevnar = PCV13  * Pneumovax = PPSV23   Adults 25-60 years old: 1-3 doses may be recommended based on certain risk factors  Adults 72 years old: Prevnar (PCV13) vaccine recommended followed by Pneumovax (PPSV23) vaccine  If already received PPSV23 since turning 65, then PCV13 recommended at least one year after PPSV23 dose  Hepatitis B Vaccine 3 dose series if at intermediate or high risk (ex: diabetes, end stage renal disease, liver disease)   Tetanus (Td) Vaccine - COST NOT COVERED BY MEDICARE PART B Following completion of primary series, a booster dose should be given every 10 years to maintain immunity against tetanus  Td may also be given as tetanus wound prophylaxis  Tdap Vaccine - COST NOT COVERED BY MEDICARE PART B Recommended at least once for all adults  For pregnant patients, recommended with each pregnancy  Shingles Vaccine (Shingrix) - COST NOT COVERED BY MEDICARE PART B  2 shot series recommended in those aged 48 and above     Health Maintenance Due:  There are no preventive care reminders to display for this patient  Immunizations Due:      Topic Date Due    Pneumococcal Vaccine: 65+ Years (1 of 2 - PPSV23) Never done    COVID-19 Vaccine (1) Never done     Advance Directives   What are advance directives? Advance directives are legal documents that state your wishes and plans for medical care  These plans are made ahead of time in case you lose your ability to make decisions for yourself  Advance directives can apply to any medical decision, such as the treatments you want, and if you want to donate organs  What are the types of advance directives?   There are many types of advance directives, and each state has rules about how to use them  You may choose a combination of any of the following:  · Living will: This is a written record of the treatment you want  You can also choose which treatments you do not want, which to limit, and which to stop at a certain time  This includes surgery, medicine, IV fluid, and tube feedings  · Durable power of  for healthcare [facility]): This is a written record that states who you want to make healthcare choices for you when you are unable to make them for yourself  This person, called a proxy, is usually a family member or a friend  You may choose more than 1 proxy  · Do not resuscitate (DNR) order:  A DNR order is used in case your heart stops beating or you stop breathing  It is a request not to have certain forms of treatment, such as CPR  A DNR order may be included in other types of advance directives  · Medical directive: This covers the care that you want if you are in a coma, near death, or unable to make decisions for yourself  You can list the treatments you want for each condition  Treatment may include pain medicine, surgery, blood transfusions, dialysis, IV or tube feedings, and a ventilator (breathing machine)  · Values history: This document has questions about your views, beliefs, and how you feel and think about life  This information can help others choose the care that you would choose  Why are advance directives important? An advance directive helps you control your care  Although spoken wishes may be used, it is better to have your wishes written down  Spoken wishes can be misunderstood, or not followed  Treatments may be given even if you do not want them  An advance directive may make it easier for your family to make difficult choices about your care  Urinary Incontinence   Urinary incontinence (UI)  is when you lose control of your bladder   UI develops because your bladder cannot store or empty urine properly  The 3 most common types of UI are stress incontinence, urge incontinence, or both  Medicines:   · May be given to help strengthen your bladder control  Report any side effects of medication to your healthcare provider  Do pelvic muscle exercises often:  Your pelvic muscles help you stop urinating  Squeeze these muscles tight for 5 seconds, then relax for 5 seconds  Gradually work up to squeezing for 10 seconds  Do 3 sets of 15 repetitions a day, or as directed  This will help strengthen your pelvic muscles and improve bladder control  Train your bladder:  Go to the bathroom at set times, such as every 2 hours, even if you do not feel the urge to go  You can also try to hold your urine when you feel the urge to go  For example, hold your urine for 5 minutes when you feel the urge to go  As that becomes easier, hold your urine for 10 minutes  Self-care:   · Keep a UI record  Write down how often you leak urine and how much you leak  Make a note of what you were doing when you leaked urine  · Drink liquids as directed  You may need to limit the amount of liquid you drink to help control your urine leakage  Do not drink any liquid right before you go to bed  Limit or do not have drinks that contain caffeine or alcohol  · Prevent constipation  Eat a variety of high-fiber foods  Good examples are high-fiber cereals, beans, vegetables, and whole-grain breads  Walking is the best way to trigger your intestines to have a bowel movement  · Exercise regularly and maintain a healthy weight  Weight loss and exercise will decrease pressure on your bladder and help you control your leakage  · Use a catheter as directed  to help empty your bladder  A catheter is a tiny, plastic tube that is put into your bladder to drain your urine  · Go to behavior therapy as directed  Behavior therapy may be used to help you learn to control your urge to urinate      Narcotic (Opioid) Safety    Use narcotics safely:  · Take prescribed narcotics exactly as directed  · Do not give narcotics to others or take narcotics that belong to someone else  · Do not mix narcotics without medicines or alcohol  · Do not drive or operate heavy machinery after you take the narcotic  · Monitor for side effects and notify your healthcare provider if you experienced side effects such as nausea, sleepiness, itching, or trouble thinking clearly  Manage constipation:    Constipation is the most common side effect of narcotic medicine  Constipation is when you have hard, dry bowel movements, or you go longer than usual between bowel movements  Tell your healthcare provider about all changes in your bowel movements while you are taking narcotics  He or she may recommend laxative medicine to help you have a bowel movement  He or she may also change the kind of narcotic you are taking, or change when you take it  The following are more ways you can prevent or relieve constipation:    · Drink liquids as directed  You may need to drink extra liquids to help soften and move your bowels  Ask how much liquid to drink each day and which liquids are best for you  · Eat high-fiber foods  This may help decrease constipation by adding bulk to your bowel movements  High-fiber foods include fruits, vegetables, whole-grain breads and cereals, and beans  Your healthcare provider or dietitian can help you create a high-fiber meal plan  Your provider may also recommend a fiber supplement if you cannot get enough fiber from food  · Exercise regularly  Regular physical activity can help stimulate your intestines  Walking is a good exercise to prevent or relieve constipation  Ask which exercises are best for you  · Schedule a time each day to have a bowel movement  This may help train your body to have regular bowel movements  Bend forward while you are on the toilet to help move the bowel movement out   Sit on the toilet for at least 10 minutes, even if you do not have a bowel movement  Store narcotics safely:   · Store narcotics where others cannot easily get them  Keep them in a locked cabinet or secure area  Do not  keep them in a purse or other bag you carry with you  A person may be looking for something else and find the narcotics  · Make sure narcotics are stored out of the reach of children  A child can easily overdose on narcotics  Narcotics may look like candy to a small child  The best way to dispose of narcotics: The laws vary by country and area  In the [facility], the best way is to return the narcotics through a take-back program  This program is offered by the [employer])  The following are options for using the program:  · Take the narcotics to a SEAN collection site  The site is often a law enforcement center  Call your local law enforcement center for scheduled take-back days in your area  You will be given information on where to go if the collection site is in a different location  · Take the narcotics to an approved pharmacy or hospital   A pharmacy or hospital may be set up as a collection site  You will need to ask if it is a SEAN collection site if you were not directed there  A pharmacy or doctor's office may not be able to take back narcotics unless it is a SEAN site  · Use a mail-back system  This means you are given containers to put the narcotics into  You will then mail them in the containers  · Use a take-back drop box  This is a place to leave the narcotics at any time  People and animals will not be able to get into the box  Your local law enforcement agency can tell you where to find a drop box in your area  Other ways to manage pain:   · Ask your healthcare provider about non-narcotic medicines to control pain  Nonprescription medicines include NSAIDs (such as ibuprofen) and acetaminophen  Prescription medicines include muscle relaxers, antidepressants, and steroids    · Pain may be managed without any medicines  Some ways to relieve pain include massage, aromatherapy, or meditation  Physical or occupational therapy may also help  For more information:   · Drug Enforcement Administration  [address] , [address]  Phone: 6- 978 - [phone]  Web Address: [email or web address]_disposal/    · [address] and Drug Administration  [place] [address] , [address]  Phone: 4- [phone] - [phone]  Web Address: http://[employer]/     © Copyright [employer] 2018 Information is for End User's use only and may not be sold, redistributed or otherwise used for commercial purposes  All illustrations and images included in CareNotes® are the copyrighted property of [employer]  or [facility] Preventive Visit Patient Instructions  Thank you for completing your Welcome to Medicare Visit or Medicare Annual Wellness Visit today  Your next wellness visit will be due in one year (9/21/2022)  The screening/preventive services that you may require over the next 5-10 years are detailed below  Some tests may not apply to you based off risk factors and/or age  Screening tests ordered at today's visit but not completed yet may show as past due  Also, please note that scanned in results may not display below  Preventive Screenings:  Service Recommendations Previous Testing/Comments   Colorectal Cancer Screening  * Colonoscopy    * Fecal Occult Blood Test (FOBT)/Fecal Immunochemical Test (FIT)  * Fecal DNA/Cologuard Test  * Flexible Sigmoidoscopy Age: 54-65 years old   Colonoscopy: every 10 years (may be performed more frequently if at higher risk)  OR  FOBT/FIT: every 1 year  OR  Cologuard: every 3 years  OR  Sigmoidoscopy: every 5 years  Screening may be recommended earlier than age 48 if at higher risk for colorectal cancer   Also, an individualized decision between you and your healthcare provider will decide whether screening between the ages of 74-80 would be appropriate  Colonoscopy: Not on file  FOBT/FIT: 05/07/2019  Cologuard: Not on file  Sigmoidoscopy: Not on file    Screening Not Indicated  Screening Not Indicated     Breast Cancer Screening Age: 36 years old  Frequency: every 1-2 years  Not required if history of left and right mastectomy Mammogram: Not on file    Screening Not Indicated  Screening Not Indicated   Cervical Cancer Screening Between the ages of 21-29, pap smear recommended once every 3 years  Between the ages of 33-67, can perform pap smear with HPV co-testing every 5 years  Recommendations may differ for women with a history of total hysterectomy, cervical cancer, or abnormal pap smears in past  Pap Smear: Not on file    Screening Not Indicated  Screening Not Indicated   Hepatitis C Screening Once for adults born between 1945 and 1965  More frequently in patients at high risk for Hepatitis C Hep C Antibody: Not on file    Risks and Benefits Discussed  Patient Declines  Screening Not Indicated  Risks and Benefits Discussed  Patient Declines  Screening Not Indicated   Diabetes Screening 1-2 times per year if you're at risk for diabetes or have pre-diabetes Fasting glucose: 90 mg/dL   A1C: No results in last 5 years    Screening Current  Screening Current   Cholesterol Screening Once every 5 years if you don't have a lipid disorder  May order more often based on risk factors  Lipid panel: 10/23/2017    Screening Current  Screening Current     Other Preventive Screenings Covered by Medicare:  6  Abdominal Aortic Aneurysm (AAA) Screening: covered once if your at risk  You're considered to be at risk if you have a family history of AAA    7  Lung Cancer Screening: covers low dose CT scan once per year if you meet all of the following conditions: (1) Age 50-69; (2) No signs or symptoms of lung cancer; (3) Current smoker or have quit smoking within the last 15 years; (4) You have a tobacco smoking history of at least 30 pack years (packs per day multiplied by number of years you smoked); (5) You get a written order from a healthcare provider  8  Glaucoma Screening: covered annually if you're considered high risk: (1) You have diabetes OR (2) Family history of glaucoma OR (3)  aged 48 and older OR (3)  American aged 72 and older  5  Osteoporosis Screening: covered every 2 years if you meet one of the following conditions: (1) You're estrogen deficient and at risk for osteoporosis based off medical history and other findings; (2) Have a vertebral abnormality; (3) On glucocorticoid therapy for more than 3 months; (4) Have primary hyperparathyroidism; (5) On osteoporosis medications and need to assess response to drug therapy  · Last bone density test (DXA Scan): Not on file  10  HIV Screening: covered annually if you're between the age of 12-76  Also covered annually if you are younger than 13 and older than 72 with risk factors for HIV infection  For pregnant patients, it is covered up to 3 times per pregnancy  Immunizations:  Immunization Recommendations   Influenza Vaccine Annual influenza vaccination during flu season is recommended for all persons aged >= 6 months who do not have contraindications   Pneumococcal Vaccine (Prevnar and Pneumovax)  * Prevnar = PCV13  * Pneumovax = PPSV23   Adults 25-60 years old: 1-3 doses may be recommended based on certain risk factors  Adults 72 years old: Prevnar (PCV13) vaccine recommended followed by Pneumovax (PPSV23) vaccine  If already received PPSV23 since turning 65, then PCV13 recommended at least one year after PPSV23 dose  Hepatitis B Vaccine 3 dose series if at intermediate or high risk (ex: diabetes, end stage renal disease, liver disease)   Tetanus (Td) Vaccine - COST NOT COVERED BY MEDICARE PART B Following completion of primary series, a booster dose should be given every 10 years to maintain immunity against tetanus   Td may also be given as tetanus wound prophylaxis  Tdap Vaccine - COST NOT COVERED BY MEDICARE PART B Recommended at least once for all adults  For pregnant patients, recommended with each pregnancy  Shingles Vaccine (Shingrix) - COST NOT COVERED BY MEDICARE PART B  2 shot series recommended in those aged 48 and above     Health Maintenance Due:  There are no preventive care reminders to display for this patient  Immunizations Due:      Topic Date Due    Pneumococcal Vaccine: 65+ Years (1 of 2 - PPSV23) Never done    COVID-19 Vaccine (1) Never done     Advance Directives   What are advance directives? Advance directives are legal documents that state your wishes and plans for medical care  These plans are made ahead of time in case you lose your ability to make decisions for yourself  Advance directives can apply to any medical decision, such as the treatments you want, and if you want to donate organs  What are the types of advance directives? There are many types of advance directives, and each state has rules about how to use them  You may choose a combination of any of the following:  · Living will: This is a written record of the treatment you want  You can also choose which treatments you do not want, which to limit, and which to stop at a certain time  This includes surgery, medicine, IV fluid, and tube feedings  · Durable power of  for healthcare [facility]): This is a written record that states who you want to make healthcare choices for you when you are unable to make them for yourself  This person, called a proxy, is usually a family member or a friend  You may choose more than 1 proxy  · Do not resuscitate (DNR) order:  A DNR order is used in case your heart stops beating or you stop breathing  It is a request not to have certain forms of treatment, such as CPR  A DNR order may be included in other types of advance directives  · Medical directive:   This covers the care that you want if you are in a coma, near death, or unable to make decisions for yourself  You can list the treatments you want for each condition  Treatment may include pain medicine, surgery, blood transfusions, dialysis, IV or tube feedings, and a ventilator (breathing machine)  · Values history: This document has questions about your views, beliefs, and how you feel and think about life  This information can help others choose the care that you would choose  Why are advance directives important? An advance directive helps you control your care  Although spoken wishes may be used, it is better to have your wishes written down  Spoken wishes can be misunderstood, or not followed  Treatments may be given even if you do not want them  An advance directive may make it easier for your family to make difficult choices about your care  Urinary Incontinence   Urinary incontinence (UI)  is when you lose control of your bladder  UI develops because your bladder cannot store or empty urine properly  The 3 most common types of UI are stress incontinence, urge incontinence, or both  Medicines:   · May be given to help strengthen your bladder control  Report any side effects of medication to your healthcare provider  Do pelvic muscle exercises often:  Your pelvic muscles help you stop urinating  Squeeze these muscles tight for 5 seconds, then relax for 5 seconds  Gradually work up to squeezing for 10 seconds  Do 3 sets of 15 repetitions a day, or as directed  This will help strengthen your pelvic muscles and improve bladder control  Train your bladder:  Go to the bathroom at set times, such as every 2 hours, even if you do not feel the urge to go  You can also try to hold your urine when you feel the urge to go  For example, hold your urine for 5 minutes when you feel the urge to go  As that becomes easier, hold your urine for 10 minutes  Self-care:   · Keep a UI record  Write down how often you leak urine and how much you leak   Make a note of what you were doing when you leaked urine  · Drink liquids as directed  You may need to limit the amount of liquid you drink to help control your urine leakage  Do not drink any liquid right before you go to bed  Limit or do not have drinks that contain caffeine or alcohol  · Prevent constipation  Eat a variety of high-fiber foods  Good examples are high-fiber cereals, beans, vegetables, and whole-grain breads  Walking is the best way to trigger your intestines to have a bowel movement  · Exercise regularly and maintain a healthy weight  Weight loss and exercise will decrease pressure on your bladder and help you control your leakage  · Use a catheter as directed  to help empty your bladder  A catheter is a tiny, plastic tube that is put into your bladder to drain your urine  · Go to behavior therapy as directed  Behavior therapy may be used to help you learn to control your urge to urinate  Narcotic (Opioid) Safety    Use narcotics safely:  · Take prescribed narcotics exactly as directed  · Do not give narcotics to others or take narcotics that belong to someone else  · Do not mix narcotics without medicines or alcohol  · Do not drive or operate heavy machinery after you take the narcotic  · Monitor for side effects and notify your healthcare provider if you experienced side effects such as nausea, sleepiness, itching, or trouble thinking clearly  Manage constipation:    Constipation is the most common side effect of narcotic medicine  Constipation is when you have hard, dry bowel movements, or you go longer than usual between bowel movements  Tell your healthcare provider about all changes in your bowel movements while you are taking narcotics  He or she may recommend laxative medicine to help you have a bowel movement  He or she may also change the kind of narcotic you are taking, or change when you take it   The following are more ways you can prevent or relieve constipation:    · Drink liquids as directed  You may need to drink extra liquids to help soften and move your bowels  Ask how much liquid to drink each day and which liquids are best for you  · Eat high-fiber foods  This may help decrease constipation by adding bulk to your bowel movements  High-fiber foods include fruits, vegetables, whole-grain breads and cereals, and beans  Your healthcare provider or dietitian can help you create a high-fiber meal plan  Your provider may also recommend a fiber supplement if you cannot get enough fiber from food  · Exercise regularly  Regular physical activity can help stimulate your intestines  Walking is a good exercise to prevent or relieve constipation  Ask which exercises are best for you  · Schedule a time each day to have a bowel movement  This may help train your body to have regular bowel movements  Bend forward while you are on the toilet to help move the bowel movement out  Sit on the toilet for at least 10 minutes, even if you do not have a bowel movement  Store narcotics safely:   · Store narcotics where others cannot easily get them  Keep them in a locked cabinet or secure area  Do not  keep them in a purse or other bag you carry with you  A person may be looking for something else and find the narcotics  · Make sure narcotics are stored out of the reach of children  A child can easily overdose on narcotics  Narcotics may look like candy to a small child  The best way to dispose of narcotics: The laws vary by country and area  In the [place], the best way is to return the narcotics through a take-back program  This program is offered by the [employer] ([employer])  The following are options for using the program:  · Take the narcotics to a SEAN collection site  The site is often a law enforcement center  Call your local law enforcement center for scheduled take-back days in your area   You will be given information on where to go if the collection site is in a different location  · Take the narcotics to an approved pharmacy or hospital   A pharmacy or hospital may be set up as a collection site  You will need to ask if it is a SEAN collection site if you were not directed there  A pharmacy or doctor's office may not be able to take back narcotics unless it is a SEAN site  · Use a mail-back system  This means you are given containers to put the narcotics into  You will then mail them in the containers  · Use a take-back drop box  This is a place to leave the narcotics at any time  People and animals will not be able to get into the box  Your local law enforcement agency can tell you where to find a drop box in your area  Other ways to manage pain:   · Ask your healthcare provider about non-narcotic medicines to control pain  Nonprescription medicines include NSAIDs (such as ibuprofen) and acetaminophen  Prescription medicines include muscle relaxers, antidepressants, and steroids  · Pain may be managed without any medicines  Some ways to relieve pain include massage, aromatherapy, or meditation  Physical or occupational therapy may also help  For more information:   · Drug Enforcement Administration  [address] [place] [address] [address]  Phone: 0- [phone] - [phone]  Web Address: [facility] gov/drug_disposal/    · [address]  and Drug Administration  [facility] , [address] [place] [address]  Phone: 5- 001 - [phone]  Web Address: http://[employer]/     © Copyright [employer] 2018 Information is for End User's use only and may not be sold, redistributed or otherwise used for commercial purposes   All illustrations and images included in CareNotes® are the copyrighted property of A D A M , [employer]  or [address] [place] [address] [place]

## 2021-09-20 NOTE — PROGRESS NOTES
Assessment and Plan:    Problem List Items Addressed This Visit        Cardiovascular and Mediastinum    Paroxysmal atrial fibrillation (HCC) (Chronic)     Patient remains on amiodarone  Sinus rhythm at present time  Not on full anticoagulation due age and risk of bleeding            Musculoskeletal and Integument    Rosacea     Metronidazole cream prescribed         Relevant Medications    metroNIDAZOLE (METROCREAM) 0 75 % cream       Genitourinary    Stage 3 chronic kidney disease, unspecified whether stage 3a or 3b CKD (HCC)     Lab Results   Component Value Date    EGFR 54 01/27/2020    EGFR 43 01/26/2020    EGFR 48 09/11/2019    CREATININE 0 92 01/27/2020    CREATININE 1 11 01/26/2020    CREATININE 1 01 09/11/2019     Based upon the patient's age  She has had actually a decent renal function in the past   Nothing to do for this but documented because it is in [facility] [address]  code            Other    Chronic pain     Patient is still having significant pain in her back and legs despite being on gabapentin as well as Vicodin  She is not a surgical candidate because she is 80years of age  Increased dose of gabapentin to 300 mg t i d  And will place patient on fentanyl patch 12 micrograms/hour every 3 days  And will continue on Vicodin for breakthrough pain  Anything we can do to keep her pain controlled  She could have further workup performed but she is 80years old and would not survive through a surgery         Relevant Medications    fentaNYL (DURAGESIC) 12 mcg/hr TD 72 hr patch    Continuous opioid dependence (HCC) (Chronic)     Certainly is  Patient remains on narcotic pain medications really as a comfort measure           Medicare annual wellness visit, subsequent - Primary     Subsequent annual wellness visit completed           Other Visit Diagnoses     Acute encephalopathy        Relevant Medications    gabapentin (NEURONTIN) 300 mg capsule        Health Maintenance Due   Topic Date Due    SLP PLAN OF CARE Never done   [employer] Annual Wellness Visit (AWV)  09/16/2021         HPI:  Robert Galvan is a 80 y o  female here for her Subsequent Wellness Visit      Patient Active Problem List   Diagnosis    Chronic pain    Postural dizziness with near syncope    Primary insomnia    Lumbar stenosis with neurogenic claudication    Acute pain of right shoulder    Arthritis of right shoulder region    Pain in joint of right elbow    Effusion of right shoulder joint    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)    Restless legs syndrome    Peripheral polyneuropathy    Mixed stress and urge urinary incontinence    Medicare annual wellness visit, subsequent    Constipation due to pain medication    Paroxysmal atrial fibrillation (HCC)    Chronic Chest and Abdominal Pain    Continuous opioid dependence (HCC)    Heart block    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    Essential hypertension    Primary osteoarthritis of right knee    Stage 3 chronic kidney disease, unspecified whether stage 3a or 3b CKD ([facility] [address] )    Rosacea     Past Medical History:   Diagnosis Date    Acute metabolic encephalopathy 2/60/8330    Chest pain     last assessed-4/15/2013    Chronic pain     back    Gait disturbance     last assessed-2/12/2014    Hypertension     Hyponatremia     Hyponatremia 5/3/2019    Hypoxemia     last assessed-1/5/2015    Melena     last assessed-2/23/2017    Pneumonia due to infectious organism 1/26/2020    Rotator cuff injury     Wheezing     last assessed-1/5/2015     Past Surgical History:   Procedure Laterality Date    APPENDECTOMY      BACK SURGERY      EYE SURGERY       Family History   Problem Relation Age of Onset    Heart attack Mother      Social History     Tobacco Use   Smoking Status Never Smoker   Smokeless Tobacco Never Used     Social History     Substance and Sexual Activity   Alcohol Use No      Social History     Substance and Sexual Activity Drug Use No       Current Outpatient Medications   Medication Sig Dispense Refill    acetaminophen (TYLENOL) 325 mg tablet Take 2 tablets (650 mg total) by mouth every 6 (six) hours as needed for mild pain, headaches or fever 30 tablet 0    ALPRAZolam (XANAX) 0 25 mg tablet Take 1 tablet (0 25 mg total) by mouth 2 (two) times a day as needed for anxiety or sleep 60 tablet 3    amiodarone 100 mg tablet Take 1 tablet (100 mg total) by mouth daily 90 tablet 3    amiodarone 200 mg tablet Take 100 mg by mouth daily      amLODIPine (NORVASC) 10 mg tablet Take 1 tablet (10 mg total) by mouth daily 90 tablet 3    aspirin 81 MG tablet Take 81 mg by mouth 2 (two) times a day      cyclobenzaprine (FLEXERIL) 5 mg tablet take 1 tablet BY MOUTH three times a day if needed for MUSCLE SPASMS 90 tablet 0    dorzolamide-timolol (COSOPT) 22 3-6 8 MG/ML ophthalmic solution Administer 1 drop into the left eye 2 (two) times a day      famotidine (PEPCID) 20 mg tablet take 1 tablet by mouth twice a day 180 tablet 3    gabapentin (NEURONTIN) 300 mg capsule Take 1 capsule (300 mg total) by mouth 3 (three) times a day 270 capsule 1    hydrALAZINE (APRESOLINE) 10 mg tablet Take 1 tablet (10 mg total) by mouth 2 (two) times a day as needed (For SBP>160 or DBP>90 consistently, start taking this medication  ) 180 tablet 0    HYDROcodone-acetaminophen (NORCO) 7 5-325 mg per tablet take 1 tablet by mouth every 6 hours if needed for pain maximum daily dose of 4 120 tablet 0    Linzess 145 MCG CAPS take 1 capsule by mouth once daily 90 capsule 1    lisinopril (ZESTRIL) 20 mg tablet Take 1 tablet (20 mg total) by mouth daily at bedtime as needed (If systolic blood pressure more than 160) 90 tablet 3    lisinopril (ZESTRIL) 40 mg tablet take 1 tablet by mouth once daily 90 tablet 3    meclizine (ANTIVERT) 25 mg tablet take 1 tablet by mouth every 8 hours if needed for dizziness 30 tablet 3    Multiple Vitamins-Minerals (ICAPS AREDS 2 PO) Take 1 tablet by mouth 2 (two) times a day      Omega-3 Fatty Acids (FISH OIL) 1,000 mg Take 1,000 mg by mouth 2 (two) times a day      rOPINIRole (REQUIP) 2 mg tablet Take 1 tablet (2 mg total) by mouth daily at bedtime 90 tablet 3    sucralfate (CARAFATE) 1 g tablet take 1 tablet by mouth three times a day before meals 90 tablet 5    traZODone (DESYREL) 50 mg tablet take 1 tablet by mouth at bedtime 90 tablet 3    fentaNYL (DURAGESIC) 12 mcg/hr TD 72 hr patch Place 1 patch on the skin every third dayMax Daily Amount: 1 patch 10 patch 0    metroNIDAZOLE (METROCREAM) 0 75 % cream Apply topically 2 (two) times a day 45 g 3     No current facility-administered medications for this visit  Allergies   Allergen Reactions    Chlorthalidone Other (See Comments)     Severe Hyponatremia (sodium 115)    Morphine GI Intolerance     "acts drunk"       There is no immunization history on file for this patient  Patient Care Team:  Gilma Ruano DO as PCP - General  MD Gilma Lawrence DO Eugene Bird, MD Angele Shih, MD (Ophthalmology)    Medicare Screening Tests and Risk Assessments:  Nadia Russo is here for her Subsequent Wellness visit  Last Medicare Wellness visit information reviewed, patient interviewed, no change since last AWV  Health Risk Assessment:   Patient rates overall health as poor  Patient feels that their physical health rating is Much worse  Patient is satisfied with their life  Eyesight was rated as Much worse  Hearing was rated as Slightly worse  Patient feels that their emotional and mental health rating is Same  Patients states they are sometimes angry  Patient states they are sometimes unusually tired/fatigued  Pain experienced in the last 7 days has been A lot  Patient states that she has experienced no weight loss or gain in last 6 months  Depression Screening:   PHQ-2 Score: 0      Fall Risk Screening:    In the past year, patient has experienced: no history of falling in past year      Urinary Incontinence Screening:   Patient has leaked urine accidently in the last six months  Home Safety:  Patient does not have trouble with stairs inside or outside of their home  Patient has working smoke alarms and has no working carbon monoxide detector  Home safety hazards include: none  Nutrition:   Current diet is Regular  Medications:   Patient is currently taking over-the-counter supplements  OTC medications include: see medication list  Patient is not able to manage medications  Activities of Daily Living (ADLs)/Instrumental Activities of Daily Living (IADLs):   Walk and transfer into and out of bed and chair?: No  Dress and groom yourself?: No    Bathe or shower yourself?: No    Feed yourself? Yes  Do your laundry/housekeeping?: No  Manage your money, pay your bills and track your expenses?: No  Make your own meals?: No    Do your own shopping?: No    Previous Hospitalizations:   Any hospitalizations or ED visits within the last 12 months?: No      Advance Care Planning:   Living will: Yes    Durable POA for healthcare:  Yes    Advanced directive: Yes    Advanced directive counseling given: No    Five wishes given: No    Patient declined ACP directive: No    End of Life Decisions reviewed with patient: Yes    Provider agrees with end of life decisions: Yes      PREVENTIVE SCREENINGS      Cardiovascular Screening:    General: Screening Current      Diabetes Screening:     General: Screening Current      Colorectal Cancer Screening:     General: Screening Not Indicated      Breast Cancer Screening:     General: Screening Not Indicated      Cervical Cancer Screening:    General: Screening Not Indicated      Osteoporosis Screening:    General: Screening Not Indicated      Abdominal Aortic Aneurysm (AAA) Screening:        General: Screening Not Indicated      Lung Cancer Screening:     General: Screening Not Indicated      Hepatitis C Screening:    General: Risks and Benefits Discussed, Patient Declines and Screening Not Indicated    Screening, Brief Intervention, and Referral to Treatment (SBIRT)    Screening  Typical number of drinks in a day: 0  Typical number of drinks in a week: 0  Interpretation: Low risk drinking behavior  AUDIT-C Screenin) How often did you have a drink containing alcohol in the past year? never  2) How many drinks did you have on a typical day when you were drinking in the past year? 0  3) How often did you have 6 or more drinks on one occasion in the past year? never    AUDIT-C Score: 0  Interpretation: Score 0-2 (female): Negative screen for alcohol misuse    Single Item Drug Screening:  How often have you used an illegal drug (including marijuana) or a prescription medication for non-medical reasons in the past year? never    Single Item Drug Screen Score: 0  Interpretation: Negative screen for possible drug use disorder    Review of Current Opioid Use    Opioid Risk Tool (ORT) Interpretation: Complete Opioid Risk Tool (ORT)      BMI Counseling: There is no height or weight on file to calculate BMI  The BMI is above normal  Exercise recommendations include obtaining a gym membership  BMI Counseling: There is no height or weight on file to calculate BMI  Follow-up plan was not completed due to elderly patient (72 years old) where weight reduction/weight gain would complicate underlying health condition such as: illness or physical disability  Depression Screening and Follow-up Plan:   Patient was screened for depression during today's encounter  They screened negative with a PHQ-2 score of 0          Virtual AWV Consent    Reason for visit is annual wellness visit    Encounter provider Gilma Ruano DO    Provider located at [address] [place] [id number]      Recent Visits  Date Type Provider Dept   21 Office Visit Gilma Ruano DO [facility]   Showing recent visits within past 7 days and meeting all other requirements  Future Appointments  No visits were found meeting these conditions  Showing future appointments within next 150 days and meeting all other requirements       After connecting through [employer], the patient was identified by name and date of birth  Marylene Jacks was informed that this is a telemedicine visit and that the visit is being conducted through [facility] and patient was informed that this is not a secure, HIPAA-complaint platform  She agrees to proceed  My office door was closed  No one else was in the room  She acknowledged consent and understanding of privacy and security of the video platform  The patient has agreed to participate and understands they can discontinue the visit at any time    Patient is aware this is a billable service  Subjective:      Patient ID: Marylene Jacks is a 80 y o  female  [de-identified], 79-year-old female presents with her daughter for 5 subsequent annual wellness visit and follow-up of chronic conditions  She does continue to complain of significant back pain with burning pain going down both of her legs more so on the left  She does have history of degenerative arthritis of the lumbar spine, degenerative arthritis of the entire body  She is presently taking Vicodin which is not providing any significant amount of relief  Daughter's frustrated because her mother is so uncomfortable  She is very difficult to get out of the house  They decline any lab testing, flu vaccine, COVID-19 vaccine or any other vaccination  She does have an appointment scheduled for ophthalmologist   Patient does eat well    Says that her pain can be a 9/10 at times          Past Medical History:   Diagnosis Date    Acute metabolic encephalopathy 8/14/4485    Chest pain     last assessed-4/15/2013    Chronic pain     back    Gait disturbance     last assessed-2/12/2014    Hypertension     Hyponatremia     Hyponatremia 5/3/2019    Hypoxemia last assessed-1/5/2015    Melena     last assessed-2/23/2017    Pneumonia due to infectious organism 1/26/2020    Rotator cuff injury     Wheezing     last assessed-1/5/2015       Family History   Problem Relation Age of Onset    Heart attack Mother        Past Surgical History:   Procedure Laterality Date    APPENDECTOMY      BACK SURGERY      EYE SURGERY          reports that she has never smoked  She has never used smokeless tobacco  She reports that she does not drink alcohol and does not use drugs        Current Outpatient Medications:     acetaminophen (TYLENOL) 325 mg tablet, Take 2 tablets (650 mg total) by mouth every 6 (six) hours as needed for mild pain, headaches or fever, Disp: 30 tablet, Rfl: 0    ALPRAZolam (XANAX) 0 25 mg tablet, Take 1 tablet (0 25 mg total) by mouth 2 (two) times a day as needed for anxiety or sleep, Disp: 60 tablet, Rfl: 3    amiodarone 100 mg tablet, Take 1 tablet (100 mg total) by mouth daily, Disp: 90 tablet, Rfl: 3    amiodarone 200 mg tablet, Take 100 mg by mouth daily, Disp: , Rfl:     amLODIPine (NORVASC) 10 mg tablet, Take 1 tablet (10 mg total) by mouth daily, Disp: 90 tablet, Rfl: 3    aspirin 81 MG tablet, Take 81 mg by mouth 2 (two) times a day, Disp: , Rfl:     cyclobenzaprine (FLEXERIL) 5 mg tablet, take 1 tablet BY MOUTH three times a day if needed for MUSCLE SPASMS, Disp: 90 tablet, Rfl: 0    dorzolamide-timolol (COSOPT) 22 3-6 8 MG/ML ophthalmic solution, Administer 1 drop into the left eye 2 (two) times a day, Disp: , Rfl:     famotidine (PEPCID) 20 mg tablet, take 1 tablet by mouth twice a day, Disp: 180 tablet, Rfl: 3    gabapentin (NEURONTIN) 300 mg capsule, Take 1 capsule (300 mg total) by mouth 3 (three) times a day, Disp: 270 capsule, Rfl: 1    hydrALAZINE (APRESOLINE) 10 mg tablet, Take 1 tablet (10 mg total) by mouth 2 (two) times a day as needed (For SBP>160 or DBP>90 consistently, start taking this medication ), Disp: 180 tablet, Rfl: 0    HYDROcodone-acetaminophen (NORCO) 7 5-325 mg per tablet, take 1 tablet by mouth every 6 hours if needed for pain maximum daily dose of 4, Disp: 120 tablet, Rfl: 0    Linzess 145 MCG CAPS, take 1 capsule by mouth once daily, Disp: 90 capsule, Rfl: 1    lisinopril (ZESTRIL) 20 mg tablet, Take 1 tablet (20 mg total) by mouth daily at bedtime as needed (If systolic blood pressure more than 160), Disp: 90 tablet, Rfl: 3    lisinopril (ZESTRIL) 40 mg tablet, take 1 tablet by mouth once daily, Disp: 90 tablet, Rfl: 3    meclizine (ANTIVERT) 25 mg tablet, take 1 tablet by mouth every 8 hours if needed for dizziness, Disp: 30 tablet, Rfl: 3    Multiple Vitamins-Minerals (ICAPS AREDS 2 PO), Take 1 tablet by mouth 2 (two) times a day, Disp: , Rfl:     Omega-3 Fatty Acids (FISH OIL) 1,000 mg, Take 1,000 mg by mouth 2 (two) times a day, Disp: , Rfl:     rOPINIRole (REQUIP) 2 mg tablet, Take 1 tablet (2 mg total) by mouth daily at bedtime, Disp: 90 tablet, Rfl: 3    sucralfate (CARAFATE) 1 g tablet, take 1 tablet by mouth three times a day before meals, Disp: 90 tablet, Rfl: 5    traZODone (DESYREL) 50 mg tablet, take 1 tablet by mouth at bedtime, Disp: 90 tablet, Rfl: 3    fentaNYL (DURAGESIC) 12 mcg/hr TD 72 hr patch, Place 1 patch on the skin every third dayMax Daily Amount: 1 patch, Disp: 10 patch, Rfl: 0    metroNIDAZOLE (METROCREAM) 0 75 % cream, Apply topically 2 (two) times a day, Disp: 45 g, Rfl: 3    The following portions of the patient's history were reviewed and updated as appropriate: allergies, current medications, past family history, past medical history, past social history, past surgical history and problem list     Review of Systems   Constitutional: Negative  HENT: Negative  Eyes: Negative  Respiratory: Negative  Cardiovascular: Negative  Negative for leg swelling  Gastrointestinal: Positive for constipation  Endocrine: Negative  Genitourinary: Negative  Occasional incontinence of urine   Musculoskeletal: Positive for arthralgias, back pain and gait problem (Related to back pain)  Skin: Negative  Allergic/Immunologic: Negative  Hematological: Negative  Psychiatric/Behavioral: Negative  Objective:    /68   Pulse 80   SpO2 98%      Physical Exam  Vitals and nursing note reviewed  Constitutional:       Appearance: Normal appearance  She is well-developed  HENT:      Head: Normocephalic and atraumatic  Eyes:      Conjunctiva/sclera: Conjunctivae normal       Pupils: Pupils are equal, round, and reactive to light  Pulmonary:      Effort: Pulmonary effort is normal  No respiratory distress  Neurological:      General: No focal deficit present  Mental Status: She is alert  Psychiatric:         Behavior: Behavior normal          Thought Content: Thought content normal          Judgment: Judgment normal            No results found for this or any previous visit (from the past 1008 hour(s))  Assessment/Plan:    Paroxysmal atrial fibrillation ([facility] )  Patient remains on amiodarone  Sinus rhythm at present time  Not on full anticoagulation due age and risk of bleeding    Rosacea  Metronidazole cream prescribed    Stage 3 chronic kidney disease, unspecified whether stage 3a or 3b CKD ([facility])  Lab Results   Component Value Date    EGFR 54 01/27/2020    EGFR 43 01/26/2020    EGFR 48 09/11/2019    CREATININE 0 92 01/27/2020    CREATININE 1 11 01/26/2020    CREATININE 1 01 09/11/2019     Based upon the patient's age  She has had actually a decent renal function in the past   Nothing to do for this but documented because it is in [facility] [address]  code    Continuous opioid dependence ([facility] )  Certainly is  Patient remains on narcotic pain medications really as a comfort measure  Chronic pain  Patient is still having significant pain in her back and legs despite being on gabapentin as well as Vicodin    She is not a surgical candidate because she is 80years of age  Increased dose of gabapentin to 300 mg t i d  And will place patient on fentanyl patch 12 micrograms/hour every 3 days  And will continue on Vicodin for breakthrough pain  Anything we can do to keep her pain controlled  She could have further workup performed but she is 80years old and would not survive through a surgery    Medicare annual wellness visit, subsequent  Subsequent annual wellness visit completed          Problem List Items Addressed This Visit        Cardiovascular and Mediastinum    Paroxysmal atrial fibrillation ([facility] ) (Chronic)     Patient remains on amiodarone  Sinus rhythm at present time  Not on full anticoagulation due age and risk of bleeding            Musculoskeletal and Integument    Rosacea     Metronidazole cream prescribed         Relevant Medications    metroNIDAZOLE (METROCREAM) 0 75 % cream       Genitourinary    Stage 3 chronic kidney disease, unspecified whether stage 3a or 3b CKD (HCC)     Lab Results   Component Value Date    EGFR 54 01/27/2020    EGFR 43 01/26/2020    EGFR 48 09/11/2019    CREATININE 0 92 01/27/2020    CREATININE 1 11 01/26/2020    CREATININE 1 01 09/11/2019     Based upon the patient's age  She has had actually a decent renal function in the past   Nothing to do for this but documented [facility]            Other    Chronic pain     Patient is still having significant pain in her back and legs despite being on gabapentin as well as Vicodin  She is not a surgical candidate because she is 80years of age  Increased dose of gabapentin to 300 mg t i d  And will place patient on fentanyl patch 12 micrograms/hour every 3 days  And will continue on Vicodin for breakthrough pain  Anything we can do to keep her pain controlled    She could have further workup performed but she is 80years old and would not survive through a surgery         Relevant Medications    fentaNYL (DURAGESIC) 12 mcg/hr TD 72 hr patch    Continuous opioid dependence (HCC) (Chronic)     Certainly is  Patient remains on narcotic pain medications really as a comfort measure  Medicare annual wellness visit, subsequent - Primary     Subsequent annual wellness visit completed           Other Visit Diagnoses     Acute encephalopathy        Relevant Medications    gabapentin (NEURONTIN) 300 mg capsule          Problem List Items Addressed This Visit        Cardiovascular and Mediastinum    Paroxysmal atrial fibrillation (HCC) (Chronic)     Patient remains on amiodarone  Sinus rhythm at present time  Not on full anticoagulation due age and risk of bleeding            Musculoskeletal and Integument    Rosacea     Metronidazole cream prescribed         Relevant Medications    metroNIDAZOLE (METROCREAM) 0 75 % cream       Genitourinary    Stage 3 chronic kidney disease, unspecified whether stage 3a or 3b CKD (HCC)     Lab Results   Component Value Date    EGFR 54 01/27/2020    EGFR 43 01/26/2020    EGFR 48 09/11/2019    CREATININE 0 92 01/27/2020    CREATININE 1 11 01/26/2020    CREATININE 1 01 09/11/2019     Based upon the patient's age  She has had actually a decent renal function in the past   Nothing to do for this but documented because it is in [facility] [address]  code            Other    Chronic pain     Patient is still having significant pain in her back and legs despite being on gabapentin as well as Vicodin  She is not a surgical candidate because she is 80years of age  Increased dose of gabapentin to 300 mg t i d  And will place patient on fentanyl patch 12 micrograms/hour every 3 days  And will continue on Vicodin for breakthrough pain  Anything we can do to keep her pain controlled  She could have further workup performed but she is 80years old and would not survive through a surgery         Relevant Medications    fentaNYL (DURAGESIC) 12 mcg/hr TD 72 hr patch    Continuous opioid dependence (HCC) (Chronic)     Certainly is    Patient remains on narcotic pain medications really as a comfort measure           Medicare annual wellness visit, subsequent - Primary     Subsequent annual wellness visit completed           Other Visit Diagnoses     Acute encephalopathy        Relevant Medications    gabapentin (NEURONTIN) 300 mg capsule

## 2021-09-21 PROBLEM — L71.9 ROSACEA: Status: ACTIVE | Noted: 2021-01-01

## 2021-09-21 NOTE — ASSESSMENT & PLAN NOTE
Patient is still having significant pain in her back and legs despite being on gabapentin as well as Vicodin  She is not a surgical candidate because she is 80years of age  Increased dose of gabapentin to 300 mg t i d  And will place patient on fentanyl patch 12 micrograms/hour every 3 days  And will continue on Vicodin for breakthrough pain  Anything we can do to keep her pain controlled    She could have further workup performed but she is 80years old and would not survive through a surgery

## 2021-09-21 NOTE — ASSESSMENT & PLAN NOTE
Patient remains on amiodarone  Sinus rhythm at present time    Not on full anticoagulation due age and risk of bleeding

## 2021-09-21 NOTE — ASSESSMENT & PLAN NOTE
Lab Results   Component Value Date    EGFR 54 01/27/2020    EGFR 43 01/26/2020    EGFR 48 09/11/2019    CREATININE 0 92 01/27/2020    CREATININE 1 11 01/26/2020    CREATININE 1 01 09/11/2019     Based upon the patient's age    She has had actually a decent renal function in the past   Nothing to do for this but documented because [facility]  [facility]

## 2021-10-06 PROBLEM — R54 AGE-RELATED PHYSICAL DEBILITY: Status: ACTIVE | Noted: 2021-01-01

## 2021-10-08 NOTE — TELEPHONE ENCOUNTER
Pt's daughter called about getting a refill for pt on two medications - HYDROcodone-acetaminophen and alprazolam  I told her I was going to transfer her to the prescribe line, she got upset and hung up

## 2022-01-04 NOTE — ASSESSMENT & PLAN NOTE
Patient does not appear confused  Has difficult finding the right words  nella all pertinent systems normal